# Patient Record
Sex: MALE | Race: WHITE | NOT HISPANIC OR LATINO | Employment: OTHER | URBAN - METROPOLITAN AREA
[De-identification: names, ages, dates, MRNs, and addresses within clinical notes are randomized per-mention and may not be internally consistent; named-entity substitution may affect disease eponyms.]

---

## 2017-01-02 ENCOUNTER — GENERIC CONVERSION - ENCOUNTER (OUTPATIENT)
Dept: OTHER | Facility: OTHER | Age: 72
End: 2017-01-02

## 2017-05-19 ENCOUNTER — LAB (OUTPATIENT)
Dept: LAB | Facility: CLINIC | Age: 72
End: 2017-05-19
Payer: COMMERCIAL

## 2017-05-19 DIAGNOSIS — K63.5 POLYP OF COLON: ICD-10-CM

## 2017-05-19 DIAGNOSIS — I25.10 ATHEROSCLEROTIC HEART DISEASE OF NATIVE CORONARY ARTERY WITHOUT ANGINA PECTORIS: ICD-10-CM

## 2017-05-19 LAB
ALBUMIN SERPL BCP-MCNC: 3.8 G/DL (ref 3.5–5)
ALP SERPL-CCNC: 81 U/L (ref 46–116)
ALT SERPL W P-5'-P-CCNC: 33 U/L (ref 12–78)
ANION GAP SERPL CALCULATED.3IONS-SCNC: 8 MMOL/L (ref 4–13)
AST SERPL W P-5'-P-CCNC: 35 U/L (ref 5–45)
BASOPHILS # BLD AUTO: 0.07 THOUSANDS/ΜL (ref 0–0.1)
BASOPHILS NFR BLD AUTO: 1 % (ref 0–1)
BILIRUB SERPL-MCNC: 0.77 MG/DL (ref 0.2–1)
BUN SERPL-MCNC: 14 MG/DL (ref 5–25)
CALCIUM SERPL-MCNC: 8.7 MG/DL (ref 8.3–10.1)
CHLORIDE SERPL-SCNC: 104 MMOL/L (ref 100–108)
CHOLEST SERPL-MCNC: 127 MG/DL (ref 50–200)
CO2 SERPL-SCNC: 28 MMOL/L (ref 21–32)
CREAT SERPL-MCNC: 0.86 MG/DL (ref 0.6–1.3)
EOSINOPHIL # BLD AUTO: 0.7 THOUSAND/ΜL (ref 0–0.61)
EOSINOPHIL NFR BLD AUTO: 13 % (ref 0–6)
ERYTHROCYTE [DISTWIDTH] IN BLOOD BY AUTOMATED COUNT: 12.6 % (ref 11.6–15.1)
GFR SERPL CREATININE-BSD FRML MDRD: >60 ML/MIN/1.73SQ M
GLUCOSE P FAST SERPL-MCNC: 95 MG/DL (ref 65–99)
HCT VFR BLD AUTO: 42.7 % (ref 36.5–49.3)
HDLC SERPL-MCNC: 47 MG/DL (ref 40–60)
HGB BLD-MCNC: 14.5 G/DL (ref 12–17)
LDLC SERPL CALC-MCNC: 35 MG/DL (ref 0–100)
LYMPHOCYTES # BLD AUTO: 1.92 THOUSANDS/ΜL (ref 0.6–4.47)
LYMPHOCYTES NFR BLD AUTO: 35 % (ref 14–44)
MCH RBC QN AUTO: 34.5 PG (ref 26.8–34.3)
MCHC RBC AUTO-ENTMCNC: 34 G/DL (ref 31.4–37.4)
MCV RBC AUTO: 102 FL (ref 82–98)
MONOCYTES # BLD AUTO: 0.43 THOUSAND/ΜL (ref 0.17–1.22)
MONOCYTES NFR BLD AUTO: 8 % (ref 4–12)
NEUTROPHILS # BLD AUTO: 2.38 THOUSANDS/ΜL (ref 1.85–7.62)
NEUTS SEG NFR BLD AUTO: 43 % (ref 43–75)
NRBC BLD AUTO-RTO: 0 /100 WBCS
PLATELET # BLD AUTO: 139 THOUSANDS/UL (ref 149–390)
PMV BLD AUTO: 9.7 FL (ref 8.9–12.7)
POTASSIUM SERPL-SCNC: 3.9 MMOL/L (ref 3.5–5.3)
PROT SERPL-MCNC: 7.1 G/DL (ref 6.4–8.2)
RBC # BLD AUTO: 4.2 MILLION/UL (ref 3.88–5.62)
SODIUM SERPL-SCNC: 140 MMOL/L (ref 136–145)
TRIGL SERPL-MCNC: 225 MG/DL
WBC # BLD AUTO: 5.5 THOUSAND/UL (ref 4.31–10.16)

## 2017-05-19 PROCEDURE — 80053 COMPREHEN METABOLIC PANEL: CPT

## 2017-05-19 PROCEDURE — 36415 COLL VENOUS BLD VENIPUNCTURE: CPT

## 2017-05-19 PROCEDURE — 80061 LIPID PANEL: CPT

## 2017-05-19 PROCEDURE — 85025 COMPLETE CBC W/AUTO DIFF WBC: CPT

## 2017-05-23 ENCOUNTER — ALLSCRIPTS OFFICE VISIT (OUTPATIENT)
Dept: OTHER | Facility: OTHER | Age: 72
End: 2017-05-23

## 2017-08-01 ENCOUNTER — ALLSCRIPTS OFFICE VISIT (OUTPATIENT)
Dept: OTHER | Facility: OTHER | Age: 72
End: 2017-08-01

## 2017-08-22 ENCOUNTER — ALLSCRIPTS OFFICE VISIT (OUTPATIENT)
Dept: OTHER | Facility: OTHER | Age: 72
End: 2017-08-22

## 2017-10-05 ENCOUNTER — ANESTHESIA EVENT (OUTPATIENT)
Dept: PERIOP | Facility: AMBULARY SURGERY CENTER | Age: 72
End: 2017-10-05
Payer: COMMERCIAL

## 2017-11-03 ENCOUNTER — HOSPITAL ENCOUNTER (OUTPATIENT)
Facility: AMBULARY SURGERY CENTER | Age: 72
Setting detail: OUTPATIENT SURGERY
Discharge: HOME | End: 2017-11-03
Attending: COLON & RECTAL SURGERY | Admitting: COLON & RECTAL SURGERY
Payer: COMMERCIAL

## 2017-11-03 ENCOUNTER — ANESTHESIA (OUTPATIENT)
Dept: PERIOP | Facility: AMBULARY SURGERY CENTER | Age: 72
End: 2017-11-03
Payer: COMMERCIAL

## 2017-11-03 VITALS
HEIGHT: 69 IN | RESPIRATION RATE: 18 BRPM | SYSTOLIC BLOOD PRESSURE: 128 MMHG | DIASTOLIC BLOOD PRESSURE: 70 MMHG | HEART RATE: 72 BPM | TEMPERATURE: 97.4 F | WEIGHT: 207 LBS | OXYGEN SATURATION: 95 % | BODY MASS INDEX: 30.66 KG/M2

## 2017-11-03 DIAGNOSIS — Z86.010 HISTORY OF COLONIC POLYPS: ICD-10-CM

## 2017-11-03 PROCEDURE — 88305 TISSUE EXAM BY PATHOLOGIST: CPT | Performed by: COLON & RECTAL SURGERY

## 2017-11-03 RX ORDER — PROPOFOL 10 MG/ML
INJECTION, EMULSION INTRAVENOUS CONTINUOUS PRN
Status: DISCONTINUED | OUTPATIENT
Start: 2017-11-03 | End: 2017-11-03 | Stop reason: SURG

## 2017-11-03 RX ORDER — ATENOLOL 25 MG/1
25 TABLET ORAL DAILY
COMMUNITY
End: 2018-08-17 | Stop reason: SDUPTHER

## 2017-11-03 RX ORDER — OMEPRAZOLE 20 MG/1
20 CAPSULE, DELAYED RELEASE ORAL DAILY
COMMUNITY
End: 2018-04-30

## 2017-11-03 RX ORDER — PAROXETINE 10 MG/1
10 TABLET, FILM COATED ORAL DAILY
COMMUNITY
End: 2018-09-22 | Stop reason: SDUPTHER

## 2017-11-03 RX ORDER — CLOPIDOGREL BISULFATE 75 MG/1
75 TABLET ORAL DAILY
COMMUNITY
End: 2018-08-30 | Stop reason: ALTCHOICE

## 2017-11-03 RX ORDER — PROPOFOL 10 MG/ML
INJECTION, EMULSION INTRAVENOUS AS NEEDED
Status: DISCONTINUED | OUTPATIENT
Start: 2017-11-03 | End: 2017-11-03 | Stop reason: SURG

## 2017-11-03 RX ORDER — SODIUM CHLORIDE, SODIUM LACTATE, POTASSIUM CHLORIDE, CALCIUM CHLORIDE 600; 310; 30; 20 MG/100ML; MG/100ML; MG/100ML; MG/100ML
INJECTION, SOLUTION INTRAVENOUS CONTINUOUS PRN
Status: DISCONTINUED | OUTPATIENT
Start: 2017-11-03 | End: 2017-11-03 | Stop reason: SURG

## 2017-11-03 RX ORDER — OMEGA-3-ACID ETHYL ESTERS 1 G/1
2 CAPSULE, LIQUID FILLED ORAL 2 TIMES DAILY
COMMUNITY
End: 2018-03-27 | Stop reason: SDUPTHER

## 2017-11-03 RX ORDER — UBIDECARENONE 200 MG
400 CAPSULE ORAL DAILY
COMMUNITY

## 2017-11-03 RX ORDER — EZETIMIBE AND SIMVASTATIN 10; 20 MG/1; MG/1
1 TABLET ORAL
COMMUNITY
End: 2018-07-24 | Stop reason: SDUPTHER

## 2017-11-03 RX ORDER — VALSARTAN AND HYDROCHLOROTHIAZIDE 160; 12.5 MG/1; MG/1
1 TABLET, FILM COATED ORAL DAILY
COMMUNITY
End: 2018-06-03 | Stop reason: SDUPTHER

## 2017-11-03 RX ADMIN — SODIUM CHLORIDE, SODIUM LACTATE, POTASSIUM CHLORIDE, AND CALCIUM CHLORIDE: .6; .31; .03; .02 INJECTION, SOLUTION INTRAVENOUS at 11:28

## 2017-11-03 RX ADMIN — PROPOFOL 100 MCG/KG/MIN: 10 INJECTION, EMULSION INTRAVENOUS at 12:09

## 2017-11-03 RX ADMIN — PROPOFOL 100 MG: 10 INJECTION, EMULSION INTRAVENOUS at 12:09

## 2017-11-03 NOTE — OP NOTE
OPERATIVE REPORT/COLONOSCOPY   PATIENT NAME: Ana Dunn    :  1945  MRN: [id number]  Pt Location: [facility]  GI ROOM 01    SURGERY DATE: 11/3/2017    Surgeon(s) and Role:     Andrea Parry MD - Primary    Preop Diagnosis:  History of colonic polyps [Z86 010]    Post-Op Diagnosis Codes:     * History of colonic polyps [Z86 010]    Procedure(s) (LRB):  COLONOSCOPY (N/A)    Specimen(s):  ID Type Source Tests Collected by Time Destination   1 : ascending colon polyp hot forcep Tissue Polyp, Colorectal TISSUE EXAM Sunil Lee MD 11/3/2017 1219    2 : hepatic flexure polyp hot forcep Tissue Polyp, Colorectal TISSUE EXAM Sunil Lee MD 11/3/2017 1221    3 : transverse colon polyp hot forcep Tissue Polyp, Colorectal TISSUE EXAM Sunil Lee MD 11/3/2017 1222        Estimated Blood Loss:   Minimal    Drains:       Anesthesia Type:   IV Sedation with Anesthesia    Operative Indications:  History of colonic polyps [Z86 010]    Operative Findings:  Normal rectal exam  No prostate nodules  Colon polyps  Diverticulosis, pancolonic, mild  Complications:   None    Procedure and Technique: The colonoscope was inserted and advanced to the cecum with ease  The cecum was identified by the confluence of the tenia coli, the appendiceal orifice, and the ileocecal valve  A 1 cm ascending colon polyp was removed with hot biopsy polypectomy  Two 1 cm polyps were identified in the transverse colon  These were removed with hot biopsy technique and sent for pathologic evaluation  A single 1 cm hepatic polyp was removed with hot biopsy technique  Diverticulosis was present minimally in the right colon and transverse colon  Mild sigmoid diverticulosis was present as well  Photographs are available in a scanned format separate from this bile      I was present for the entire procedure    Patient Disposition:  PACU     SIGNATURE: Sunil Lee MD  DATE: November 3, 2017  TIME: 12:38 PM

## 2017-11-03 NOTE — H&P
History and Physical   Colon and Rectal Surgery   Sandy Epps 67 y o  male MRN: [id number]  Unit/Bed#: OR POOL Encounter: [id number]  11/03/17   12:02 PM      CC: History of colon polyps  History of Present Illness   HPI:  Sandy Epps is a 67 y o  male for surveillance of his colon  He denies GI symptoms  He has no cardiovascular or pulmonary symptoms today  Historical Information   Past Medical History:   Diagnosis Date    Anxiety     Coronary artery disease     Depression     GERD (gastroesophageal reflux disease)     Hyperlipidemia     Hypertension     Myocardial infarction      Past Surgical History:   Procedure Laterality Date    COLONOSCOPY      CORONARY ANGIOPLASTY      ROTATOR CUFF REPAIR Bilateral        Meds/Allergies     Prescriptions Prior to Admission   Medication    aspirin (ECOTRIN) 325 mg EC tablet    atenolol (TENORMIN) 25 mg tablet    clopidogrel (PLAVIX) 75 mg tablet    Coenzyme Q10 75 MG CAPS    ezetimibe-simvastatin (VYTORIN) 10-20 mg per tablet    Glucosamine-Chondroitin (OSTEO BI-FLEX REGULAR STRENGTH PO)    Multiple Vitamins-Minerals (ONE-A-DAY 50 PLUS PO)    omega-3-acid ethyl esters (LOVAZA) 1 g capsule    omeprazole (PriLOSEC) 20 mg delayed release capsule    PARoxetine (PAXIL) 10 mg tablet    valsartan-hydrochlorothiazide (DIOVAN-HCT) 160-12 5 MG per tablet       No current facility-administered medications for this encounter       Facility-Administered Medications Ordered in Other Encounters:     lactated ringers infusion, , , Continuous PRN, Creig Cerise, CRNA    No Known Allergies      Social History   History   Alcohol Use    1 2 oz/week    2 Shots of liquor per week     Comment: 2 martinis/ day     History   Drug Use No     History   Smoking Status    Never Smoker   Smokeless Tobacco    Never Used         Family History:     Objective     Current Vitals:   Blood Pressure: 149/78 (11/03/17 1050)  Pulse: 65 (11/03/17 1050)  Temperature: (!) 97 3 °F (36 3 °C) (11/03/17 1050)  Temp Source: Temporal (11/03/17 1050)  Respirations: 18 (11/03/17 1050)  Height: 5' 9" (175 3 cm) (11/03/17 1050)  Weight - Scale: 93 9 kg (207 lb) (11/03/17 1050)  SpO2: 97 % (11/03/17 1050)  No intake or output data in the 24 hours ending 11/03/17 1202    Physical Exam:  General:  Well nourished, no distress  Neuro: Alert and oriented  Eyes:Sclera anicteric, conjunctiva pink  Pulm: Clear to auscultation bilaterally  No respiratory Distress  CV:  Regular rate and rhythm  No murmurs  Abdomen:  Soft, flat, non-tender, without masses or hepatosplenomegaly  Lab Results:       ASSESSMENT:  Ann Arceo is a 67 y o  male for surveillance of polyps  PLAN:  Colonoscopy  Risks , including, but not limited to, bleeding, perforation, missed lesions, and potential need for surgery, were reviewed  Alternatives to colonoscopy were discussed    Brian Rios MD

## 2017-11-03 NOTE — ANESTHESIA PREPROCEDURE EVALUATION
Review of Systems/Medical History  Patient summary reviewed  Chart reviewed  No history of anesthetic complications     Cardiovascular  Exercise tolerance: good,  Hyperlipidemia, Hypertension , Past MI , CAD, , Cardiac stents     Pulmonary  Negative pulmonary ROS ,        GI/Hepatic    GERD ,   Comment: History of polyps     Negative  ROS        Endo/Other  Negative endo/other ROS      GYN       Hematology  Negative hematology ROS      Musculoskeletal  Negative musculoskeletal ROS        Neurology  Negative neurology ROS      Psychology   Anxiety, Depression ,            Physical Exam    Airway    Mallampati score: II  TM Distance: >3 FB  Neck ROM: full     Dental   Comment: Partial plate upper no reported loose or chipped, upper dentures,     Cardiovascular  Cardiovascular exam normal    Pulmonary  Pulmonary exam normal     Other Findings        Anesthesia Plan  ASA Score- 3       Anesthesia Type- IV sedation with anesthesia with ASA Monitors  Additional Monitors:   Airway Plan:     Comment: Discussed with pt the possibility under sedation to have recall or mild discomfort          Induction- intravenous  Informed Consent- Anesthetic plan and risks discussed with patient  I personally reviewed this patient with the CRNA  Discussed and agreed on the Anesthesia Plan with the ASHLEY Steward

## 2017-11-03 NOTE — ANESTHESIA POSTPROCEDURE EVALUATION
Post-Op Assessment Note      CV Status:  Stable    Mental Status:  Awake and lethargic    Hydration Status:  Euvolemic    PONV Controlled:  None    Airway Patency:  Patent    Post Op Vitals Reviewed:  Yes              BP      Temp     Pulse     Resp      SpO2

## 2017-11-03 NOTE — DISCHARGE INSTRUCTIONS
Colonoscopy   WHAT YOU NEED TO KNOW:   A colonoscopy is a procedure to examine the inside of your colon (intestine) with a scope  Polyps or tissue growths may have been removed during your colonoscopy  It is normal to feel bloated and to have some abdominal discomfort  You should be passing gas  If you have hemorrhoids or you had polyps removed, you may have a small amount of bleeding  DISCHARGE INSTRUCTIONS:   Seek care immediately if:   · You have a large amount of bright red blood in your bowel movements  · Your abdomen is hard and firm and you have severe pain  · You have sudden trouble breathing  Contact your healthcare provider if:   · You develop a rash or hives  · You have a fever within 24 hours of your procedure       · You have not had a bowel movement for 3 days after your procedure  · You have questions or concerns about your condition or care  Activity:   · Do not lift, strain, or run  for 3 days after your procedure  · Rest after your procedure  You have been given medicine to relax you  Do not  drive or make important decisions until the day after your procedure  Return to your normal activity as directed  · Relieve gas and discomfort from bloating  by lying on your right side with a heating pad on your abdomen  You may need to take short walks to help the gas move out  Eat small meals until bloating is relieved  If you had polyps removed: For 7 days after your procedure:  · Do not  take aspirin  · Do not  go on long car rides  Follow up with your healthcare provider as directed:  Write down your questions so you remember to ask them during your visits  © 2017 [employer] is for End User's use only and may not be sold, redistributed or otherwise used for commercial purposes  All illustrations and images included in CareNotes® are the copyrighted property of [employer]  or Timmy Cameron    The above information is an  only  It is not intended as medical advice for individual conditions or treatments  Talk to your doctor, nurse or pharmacist before following any medical regimen to see if it is safe and effective for you

## 2017-11-21 ENCOUNTER — ALLSCRIPTS OFFICE VISIT (OUTPATIENT)
Dept: OTHER | Facility: OTHER | Age: 72
End: 2017-11-21

## 2017-11-22 NOTE — PROGRESS NOTES
Assessment    1  Acute bacterial sinusitis (461 9) (J01 90,B96 89)   2  Strain of right trapezius muscle, initial encounter (840 8) (X87 557V)    Plan  Acute bacterial sinusitis    · Amoxicillin-Pot Clavulanate 875-125 MG Oral Tablet; TAKE 1 TABLET EVERY 12HOURS DAILY   · Benzonatate 100 MG Oral Capsule; TAKE 1 CAPSULE 3 TIMES DAILY ASNEEDED  Neck pain    · TiZANidine HCl - 2 MG Oral Tablet; 1-2 TABS AT HS FOR SPASM    Discussion/Summary    72M with:Acute bacterial sinusitis with bronchitis- likely viral bronchitis, but given that he is on day 8 or 9 of URI symptoms, may also have a possible bacterial sinusitis brewing  No concern for PNA on exam Rx of Augmentin x 7 days if symptoms do not improve in the next day or two given that it is the holidayPerles 100mg TID PRN coughRobitussin AC if neededTylenol as needed for painsyrup for the coughMucinex DM 1200mg two times a dayplenty of fluidsif symptoms worsen or do not improve in the next week, reviewed warning signs and symptoms  Trapezius muscle shoulder and arm pain- likely from sleep positioning chronically given that he has hypertonicity of both muscles, but R>L, likely causing the compressive symptomspack to areasleep position-Tizanidine PRN (has used it in the past)  The patient was counseled regarding instructions for management,-- prognosis,-- impressions,-- risks and benefits of treatment options,-- importance of compliance with treatment  Possible side effects of new medications were reviewed with the patient/guardian today  The treatment plan was reviewed with the patient/guardian  The patient/guardian understands and agrees with the treatment plan      Chief Complaint    1  Cold Symptoms   2  Cough  sore throat and cough      History of Present Illness  HPI: 72M with sore throat and cough  States that it started with a sore throat x 1 week, tried to wait it out, but is hacking up a green brown sputum x 2-3 days   Sore throat has gone away, has pleuritic chest pain when he coughs  non-smokertried Mucinex but not working at all anymore, also tried [place]  fever, chills, SOB, n/v any sick contacts  PNA and flu UTDrecent changes in medications  Shoulder pain: Pt is also complaining of pain in his bilateral arms that only happens at night after he has been sleeping a few hours, no new bedtime changes  Always feels like pins/needles and a numbness sensation, but then it will go away after a few  will have back pain and lower leg stiffness after long  albuterol neb 0 63mg, ASA 325mg, Atenolol 25mg, Clopidogrel 75mg, CoQ10, Diclofenac gel, Exetimibe-Simvastatin, Omega3, Pantoprazole 40mg, Paroxetine 10mg, Tizanidine 2mg, Valsartan-HCTZ, Viagra, Tamsulosin      Review of Systems   Constitutional: no fever or chills, feels well, no tiredness, no recent weight loss or weight gain  ENT: sore throat, but-- no earache,-- no hearing loss-- and-- no nasal discharge  Cardiovascular: chest pain, but-- no palpitations  Respiratory: cough, but-- no shortness of breath,-- no orthopnea,-- no wheezing,-- no shortness of breath during exertion-- and-- no PND  Gastrointestinal: no abdominal pain,-- no nausea,-- no vomiting,-- no constipation-- and-- no diarrhea  Musculoskeletal: arthralgias,-- joint swelling,-- limb pain-- and-- joint stiffness  Neurological: numbness-- and-- tingling, but-- no headache  ROS reviewed  Active Problems  1  Acute myocardial infarction of inferior wall (410 40) (I21 19)   2  Anxiety (300 00) (F41 9)   3  Arteriosclerotic cardiovascular disease (429 2,440 9) (I25 10)   4  Asthma (493 90) (J45 909)   5  Benign colon polyp (211 3) (K63 5)   6  CAD (coronary atherosclerotic disease) (414 00) (I25 10)   7  Carpal tunnel syndrome, unspecified laterality (354 0) (G56 00)   8  Cutaneous horn (702 8) (L85 8)   9  Diverticulosis (562 10) (K57 90)   10  Dupuytren's contracture (728 6) (M72 0)   11  Essential hypertriglyceridemia (272 1) (E78 1)   12   GERD without esophagitis (530 81) (K21 9)   13  Head congestion (478 19)   14  Hyperlipidemia (272 4) (E78 5)   15  Hypertension (401 9) (I10)   16  Impingement syndrome of left shoulder (726 2) (M75 42)   17  Keratosis (701 1) (L57 0)   18  Laceration of face (873 40) (S01 81XA)   19  Myalgia And Myositis (729 1)   20  Neck pain (723 1) (M54 2)   21  Strain of neck muscle, initial encounter (847 0) (S16 1XXA)   22  Tenosynovitis Of The Left Middle Finger (727 05)   23  Thrombocytopenia (287 5) (D69 6)   24  Urgency of urination (788 63) (R39 15)    Past Medical History  1  History of Complete tear of rotator cuff, unspecified laterality (727 61) (M75 120)   2  History of upper respiratory infection (V12 09) (Z87 09)   3  History of Laceration Of Elbow (881 01)   4  History of Laceration Of Lip (873 43)    Family History  Mother    1  Family history of Brain tumor   2  Family history of CAD (coronary artery disease)  Father    3  No pertinent family history    Social History   · Former smoker (N42 17) (B88 410)   · Social alcohol use (Z78 9)    Surgical History    1  History of Shoulder Surgery   2  History of Transcath Placement Of Carotid Artery Stent    Current Meds   1  Aspirin 325 MG Oral Tablet; Therapy: (Recorded:77Gex4551) to Recorded   2  Atenolol 25 MG Oral Tablet; take 1 tablet every day; Therapy: 68Okd9847 to (Geovani Diop)  Requested for: 77KXS1978; Last Rx:20Ihl9402 Ordered   3  Centrum Silver Ultra Mens Oral Tablet; TAKE 1 TABLET DAILY; Therapy: (Recorded:30Jun2015) to Recorded   4  Clopidogrel Bisulfate 75 MG Oral Tablet; take 1 tablet by mouth every day; Therapy: 16JCT9548 to (Evaluate:83Jyq3515)  Requested for: 49Hqf5522; Last Rx:04Aug2017 Ordered   5  CoQ-10 CAPS; Take as directed Recorded   6  Diclofenac Sodium 1 % Transdermal Gel; APPLY TO ELBOW BID AS DIRECTED; Therapy: 57FGF9806 to (Everette Montelongo)  Requested for: 90EGC9287; Last Rx:05Jan2017 Ordered   7   Ezetimibe-Simvastatin 10-20 MG Oral Tablet; TAKE 1 TABLET DAILY  Requested for: 21HIB4692; Last Rx:84Atq4111 Ordered   8  Omega-3-acid Ethyl Esters 1 GM Oral Capsule; TAKE 2 CAPSULE Twice daily  Requested for: 81PTY7485; Last Rx:31Lpg3623 Ordered   9  Pantoprazole Sodium 40 MG Oral Tablet Delayed Release; take 1 tablet by mouth every day; Therapy: 87XPE7018 to (Evaluate:20Apr2018)  Requested for: 99Nif8888; Last Rx:05Icu6895 Ordered   10  PARoxetine HCl - 10 MG Oral Tablet; TAKE 1 TABLET DAILY AS DIRECTED; Therapy: 13NUO6025 to (Evaluate:10Aug2018)  Requested for: 98QAV4176; Last  Rx:25Tep7192 Ordered   11  Tamsulosin HCl - 0 4 MG Oral Capsule; 1 CAP AT HS- DO NOT COMBINE WITH  VIAGRA; Therapy: 31WTW5238 to (Saeed Cobia)  Requested for: 10Aug2017; Last  Rx:10Aug2017 Ordered   12  TiZANidine HCl - 2 MG Oral Tablet; 1-2 TABS AT HS FOR SPASM; Therapy: 40Ane8275 to (Evaluate:65Ciq5584)  Requested for: 01Lwl3330; Last  Rx:70Awt1527 Ordered   13  Valsartan-Hydrochlorothiazide 160-12 5 MG Oral Tablet; take 1 tablet every day; Therapy: 20RJJ2201 to (Jeanell Blend)  Requested for: 34BGO2084; Last  Rx:61Dra9077 Ordered   14  Viagra 50 MG Oral Tablet; Therapy: 37Sjo6757 to (Evaluate:06Tyv6440) Recorded    The medication list was reviewed and updated today  Allergies  1  Atorvastatin Calcium TABS    Vitals   Recorded: 21Nov2017 03:02PM   Temperature 98 3 F   Heart Rate 70   Respiration 16   Systolic 678, LUE, Sitting   Diastolic 74, LUE, Sitting   Height 5 ft 6 in   Weight 210 lb    BMI Calculated 33 9   BSA Calculated 2 04       Physical Exam   Constitutional  General appearance: No acute distress, well appearing and well nourished  Eyes  Conjunctiva and lids: No swelling, erythema, or discharge  Ears, Nose, Mouth, and Throat  External inspection of ears and nose: Normal    Otoscopic examination: Tympanic membrance translucent with normal light reflex  Canals patent without erythema     Nasal mucosa, septum, and turbinates: Normal without edema or erythema  Oropharynx: Abnormal   The posterior pharynx was erythematous, but-- did not have an exudate  Oropharynx examination showed no lesions  Oral mucosa was normal  The palate examination showed no abnormalities  The tongue was normal  The tonsils were normal   Pulmonary  Respiratory effort: No increased work of breathing or signs of respiratory distress  Auscultation of lungs: Clear to auscultation, equal breath sounds bilaterally, no wheezes, no rales, no rhonci  -- occ coarse breath sounds at bases that clear with coughing  Cardiovascular  Auscultation of heart: Normal rate and rhythm, normal S1 and S2, without murmurs  Musculoskeletal  Gait and station: Normal  -- slightly swollen left thumb  -- has muscle hypertonicity and TTP of right trapezius, intact strength and ROM bilaterally          Future Appointments    Date/Time Provider Specialty Site   11/27/2017 03:00 PM [address],  Internal Medicine [address]       Signatures   Electronically signed by : LENORA Schwartz ; Nov 21 2017  4:09PM EST                       (Author)

## 2017-11-23 DIAGNOSIS — I25.10 ATHEROSCLEROTIC HEART DISEASE OF NATIVE CORONARY ARTERY WITHOUT ANGINA PECTORIS: ICD-10-CM

## 2017-11-23 DIAGNOSIS — Z12.5 ENCOUNTER FOR SCREENING FOR MALIGNANT NEOPLASM OF PROSTATE: ICD-10-CM

## 2017-11-23 DIAGNOSIS — E78.5 HYPERLIPIDEMIA: ICD-10-CM

## 2017-11-30 ENCOUNTER — GENERIC CONVERSION - ENCOUNTER (OUTPATIENT)
Dept: FAMILY MEDICINE CLINIC | Facility: CLINIC | Age: 72
End: 2017-11-30

## 2017-12-21 ENCOUNTER — GENERIC CONVERSION - ENCOUNTER (OUTPATIENT)
Dept: FAMILY MEDICINE CLINIC | Facility: CLINIC | Age: 72
End: 2017-12-21

## 2018-01-04 ENCOUNTER — GENERIC CONVERSION - ENCOUNTER (OUTPATIENT)
Dept: FAMILY MEDICINE CLINIC | Facility: CLINIC | Age: 73
End: 2018-01-04

## 2018-01-09 NOTE — RESULT NOTES
Verified Results  (1) TISSUE EXAM 21Apr2016 12:00AM Leafy Latin     Test Name Result Flag Reference   LAB AP CASE REPORT (Report)     Surgical Pathology Report             Case: [id number]                   Authorizing Provider: Oscar Araujo DO      Collected:      04/21/2016           Pathologist:      Dajuan Reid MD      Received:      04/22/2016 1047        Specimen:  Skin, Other, Right upper chest   LAB AP FINAL DIAGNOSIS      A  Skin, right upper chest, shave excision:  - Seborrheic keratosis, inflamed/irritated/lichenoid and hyperkeratotic  ï¾          Interpretation performed at [place] [address]  LAB AP SURGICAL ADDITIONAL INFORMATION (Report)     These tests were developed and their performance characteristics   determined by [address] [place] [address] or [place]  They may not be cleared or approved by the [place]  Food and   Drug Administration  The FDA has determined that such clearance or   approval is not necessary  These tests are used for clinical purposes  They should not be regarded as investigational or for research  This   laboratory has been approved by [place], designated as a high-complexity   laboratory and is qualified to perform these tests  LAB AP GROSS DESCRIPTION (Report)     A  The specimen is received in formalin, labeled with the patient's name   and hospital number, and is designated shave excision right upper chest    The specimen consists of a tan superficial shave of skin measuring 0 7 x   0 7 x 0 1 cm  The skin surface exhibits a friable keratotic appearing tan   lesion which overhangs the shaved margin, measuring approximately 1 2 x 1   x 0 4 cm  The margin is inked blue  The skin surface is inked red  The   specimen is trisected lengthwise  Entirely submitted  One cassette      Note: The estimated total formalin fixation time based upon information   provided by the submitting clinician and the standard processing schedule   is over 72 hours   MAC

## 2018-01-11 NOTE — RESULT NOTES
Verified Results  * MRI ELBOW LEFT WO CONTRAST 72Jfz1457 09:10AM [facility] PRAJAPATI Order Number: [id number]    - Patient Instructions: To schedule this appointment, please contact Central Scheduling at [phone]  Test Name Result Flag Reference   MRI ELBOW LEFT WO CONTRAST (Report)     This is a summary report  The complete report is available in the patient's medical record  If you cannot access the medical record, please contact the sending organization for a detailed fax or copy  MRI LEFT ELBOW     INDICATION: Lifting injury with elbow pain  COMPARISON: None  TECHNIQUE: The following MR sequences were acquired of the left elbow:   Localizer, axial T1, axial T2 fat sat, coronal T1, Coronal STIR or T2 fat sat, sagittal T2 fat sat  Images were acquired on a 1 5 Ally MR unit  Gadolinium was not used  FINDINGS:     SUBCUTANEOUS TISSUES: Normal     JOINT EFFUSION: Joint effusion noted  TENDONS: Intact brachialis insertion  There is a partial tear involving the biceps insertion on the radial tuberosity best seen on series 3 images 5 through 8  The tear is relatively high-grade but not complete  There is associated soft tissue edema    and probable hematoma formation in this region  Triceps tendon is intact  Common flexor origin is normal  Common extensor origin tendinosis and interstitial tearing identified  LIGAMENTS: The following ligaments are intact: radial collateral, lateral ulnar collateral, annular, and ulnar collateral      CUBITAL TUNNEL INCLUDING ULNAR NERVE: Intact  RADIAL NERVE: Intact  ARTICULAR SURFACES: Intact  BONE MARROW SIGNAL: Degenerative changes noted across the elbow  MUSCULATURE: Normal        IMPRESSION:   1  High-grade partial tear at the biceps insertion on the radial tuberosity as above  2  Common extensor origin tendinosis with mild interstitial tearing appreciated         Workstation performed: [id number]     Signed by: Lana Mckeon MD   12/29/16       Plan  Nontraumatic rupture of left bicep tendon    · *1 - [facility] (ORTHOPEDIC SURGERY ) Physician  Referral  Consult Only: the expectation is that the referring provider will  communicate back to the patient on treatment options  Evaluation and Treatment: the  expectation is that the referred to provider will communicate back to the patient  on treatment options    Status: Active  Requested for: 01BCH7556  Care Summary provided  : Yes

## 2018-01-11 NOTE — RESULT NOTES
Message   Continue same meds- repeat carotid study in 1 year  Verified Results  VAS CAROTID COMPLETE STUDY 29Nov2016 12:46PM Sharron Sheets Order Number: [id number]    - Patient Instructions: To schedule this appointment, please contact Central Scheduling at [phone]  Test Name Result Flag Reference   VAS CAROTID COMPLETE STUDY (Report)     [facility] REPORT   CLINICAL:   Indications:   Bruit [R09 89]  Patient presents with occasional headaches  He is   asymptomatic from a cerebral vascular standpoint and is unsure why he is having   test done  Operative History   1/1/2004 Coronary angioplasty with stent placement   Risk Factors   The patient has history of HTN, hyperlipidemia, CAD and previous smoking (quit   >10yrs ago)  Clinical   Right Brachial Pressure: 132/70 mmHg, Left Brachial Pressure: 126/70 mmHg  FINDINGS:      Right    Impression PSV EDV (cm/s) Direction of Flow Ratio    Dist  ICA         95     32           1 13    Mid  ICA         82     27           0 97    Prox  ICA  1 - 49%   83     18           0 99    Dist CCA         97     20                 Mid CCA          84     16           0 82    Prox CCA         103     19                 Ext Carotid       110     13           1 31    Prox Vert         34     12 Antegrade            Subclavian        188      0                    Left     Impression PSV EDV (cm/s) Direction of Flow Ratio    Dist  ICA         66     19           0 66    Mid  ICA         88     27           0 88    Prox  ICA  1 - 49%   100     20           1 00    Dist CCA         99     23                 Mid CCA         100     20           0 97    Prox CCA         103     18                 Ext Carotid        98     13           0 98    Prox Vert         60     19 Antegrade            Subclavian        145     15                          CONCLUSION:   Impression   RIGHT:   There is <50% stenosis noted in the internal carotid artery   Plaque is   heterogenous and irregular  Vertebral artery flow is antegrade  There is no significant subclavian artery   disease  LEFT:   There is <50% stenosis noted in the internal carotid artery  Plaque is   heterogenous and irregular  Vertebral artery flow is antegrade  There is no significant subclavian artery   disease  Recommend repeat study in 1 year as per protocol, unless otherwise clinically   indicated        SIGNATURE:   Electronically Signed by: Aura Sim MD on 2016-11-29 02:08:53 PM

## 2018-01-13 VITALS
HEIGHT: 66 IN | RESPIRATION RATE: 16 BRPM | HEART RATE: 60 BPM | DIASTOLIC BLOOD PRESSURE: 68 MMHG | TEMPERATURE: 98.7 F | BODY MASS INDEX: 33.65 KG/M2 | SYSTOLIC BLOOD PRESSURE: 142 MMHG | WEIGHT: 209.38 LBS

## 2018-01-13 VITALS
BODY MASS INDEX: 33.75 KG/M2 | HEIGHT: 66 IN | HEART RATE: 70 BPM | TEMPERATURE: 98.3 F | RESPIRATION RATE: 16 BRPM | DIASTOLIC BLOOD PRESSURE: 74 MMHG | WEIGHT: 210 LBS | SYSTOLIC BLOOD PRESSURE: 118 MMHG

## 2018-01-13 VITALS
SYSTOLIC BLOOD PRESSURE: 130 MMHG | BODY MASS INDEX: 33.59 KG/M2 | DIASTOLIC BLOOD PRESSURE: 86 MMHG | HEART RATE: 69 BPM | HEIGHT: 66 IN | WEIGHT: 209 LBS

## 2018-01-13 NOTE — RESULT NOTES
Verified Results  (1) CBC/PLT/DIFF [id number] 07:58AM Daren Bernal    Order Number: [id number]     Test Name Result Flag Reference   WBC COUNT 5 81 Thousand/uL  4 31-10 16   RBC COUNT 4 22 Million/uL  3 88-5 62   HEMOGLOBIN 14 5 g/dL  12 0-17 0   HEMATOCRIT 42 2 %  36 5-49 3    fL H 82-98   MCH 34 4 pg H 26 8-34 3   MCHC 34 4 g/dL  31 4-37 4   RDW 12 5 %  11 6-15 1   MPV 9 9 fL  8 9-12 7   PLATELET COUNT 050 Thousands/uL L 149-390   nRBC AUTOMATED 0 /100 WBCs     NEUTROPHILS RELATIVE PERCENT 49 %  43-75   LYMPHOCYTES RELATIVE PERCENT 29 %  14-44   MONOCYTES RELATIVE PERCENT 8 %  4-12   EOSINOPHILS RELATIVE PERCENT 13 % H 0-6   BASOPHILS RELATIVE PERCENT 1 %  0-1   NEUTROPHILS ABSOLUTE COUNT 2 84 Thousands/?L  1 85-7 62   LYMPHOCYTES ABSOLUTE COUNT 1 69 Thousands/?L  0 60-4 47   MONOCYTES ABSOLUTE COUNT 0 49 Thousand/?L  0 17-1 22   EOSINOPHILS ABSOLUTE COUNT 0 73 Thousand/?L H 0 00-0 61   BASOPHILS ABSOLUTE COUNT 0 05 Thousands/?L  0 00-0 10   - Patient Instructions: This bloodwork is non-fasting  Please drink two glasses of water morning of bloodwork  - Patient Instructions: This bloodwork is non-fasting  Please drink two glasses of water morning of bloodwork  (1) COMPREHENSIVE METABOLIC PANEL [id number] 22:71LU Roberto Zaheer Order Number: [id number]     Test Name Result Flag Reference   GLUCOSE,RANDM 103 mg/dL     If the patient is fasting, the ADA then defines impaired fasting glucose as > 100 mg/dL and diabetes as > or equal to 123 mg/dL     SODIUM 138 mmol/L  136-145   POTASSIUM 3 8 mmol/L  3 5-5 3   CHLORIDE 103 mmol/L  100-108   CARBON DIOXIDE 26 mmol/L  21-32   ANION GAP (CALC) 9 mmol/L  4-13   BLOOD UREA NITROGEN 12 mg/dL  5-25   CREATININE 0 83 mg/dL  0 60-1 30   Standardized to IDMS reference method   CALCIUM 8 4 mg/dL  8 3-10 1   BILI, TOTAL 0 87 mg/dL  0 20-1 00   ALK PHOSPHATAS 92 U/L     ALT (SGPT) 43 U/L  12-78   AST(SGOT) 31 U/L 5-45   ALBUMIN 3 6 g/dL  3 5-5 0   TOTAL PROTEIN 6 7 g/dL  6 4-8 2   eGFR Non-African American      >60 0 ml/min/1 73sq m   - Patient Instructions: This is a fasting blood test  Water,black tea or black  coffee only after 9:00pm the night before test Drink 2 glasses of water the morning of test - Patient Instructions: This bloodwork is non-fasting  Please drink two glasses of   water morning of bloodwork  National Kidney Disease Education Program recommendations are as follows:  GFR calculation is accurate only with a steady state creatinine  Chronic Kidney disease less than 60 ml/min/1 73 sq  meters  Kidney failure less than 15 ml/min/1 73 sq  meters  (1) LIPID PANEL, FASTING 01Sep2016 07:58AM Basim Santiago    Order Number: [id number]     Test Name Result Flag Reference   CHOLESTEROL 126 mg/dL     HDL,DIRECT 41 mg/dL  40-60   Specimen collection should occur prior to Metamizole administration due to the potential for falsely depressed results  LDL CHOLESTEROL CALCULATED 30 mg/dL  0-100   - Patient Instructions: This is a fasting blood test  Water,black tea or black  coffee only after 9:00pm the night before test   Drink 2 glasses of water the morning of test     - Patient Instructions: This is a fasting blood test  Water,black tea or black  coffee only after 9:00pm the night before test Drink 2 glasses of water the morning of test - Patient Instructions: This bloodwork is non-fasting  Please drink two glasses of   water morning of bloodwork  Triglyceride:         Normal              <150 mg/dl       Borderline High    150-199 mg/dl       High               200-499 mg/dl       Very High          >499 mg/dl  Cholesterol:         Desirable        <200 mg/dl      Borderline High  200-239 mg/dl      High             >239 mg/dl  HDL Cholesterol:        High    >59 mg/dL      Low     <41 mg/dL  LDL CALCULATED:    This screening LDL is a calculated result    It does not have the accuracy of the Direct Measured LDL in the monitoring of patients with hyperlipidemia and/or statin therapy  Direct Measure LDL (EAW348) must be ordered separately in these patients  TRIGLYCERIDES 274 mg/dL H <=150   Specimen collection should occur prior to N-Acetylcysteine or Metamizole administration due to the potential for falsely depressed results  (1) TSH 01Sep2016 07:58AM Christian PRAJAPATI Order Number: [id number]     Test Name Result Flag Reference   TSH 3 120 uIU/mL  0 358-3 740   - Patient Instructions: This bloodwork is non-fasting  Please drink two glasses of water morning of bloodwork  - Patient Instructions: This is a fasting blood test  Water,black tea or black  coffee only after 9:00pm the night before test Drink 2 glasses of water the morning of test - Patient Instructions: This bloodwork is non-fasting  Please drink two glasses of   water morning of bloodwork  Patients undergoing fluorescein dye angiography may retain small amounts of fluorescein in the body for 48-72 hours post procedure  Samples containing fluorescein can produce falsely depressed TSH values  If the patient had this procedure,a specimen should be resubmitted post fluorescein clearance  (1) PSA (SCREEN) (Dx V76 44 Screen for Prostate Cancer) 01Sep2016 07:58AM Nalini Reyes Order Number: [id number]     Test Name Result Flag Reference   PROSTATE SPECIFIC ANTIGEN 0 9 ng/mL  0 0-4 0   - Patient Instructions: This test is non-fasting  Please drink two glasses of water morning of bloodwork  - Patient Instructions: This test is non-fasting  Please drink two glasses of water morning of bloodwork  Plan  GERD without esophagitis    · (1) CBC/PLT/DIFF; Status:Active; Requested for:09Nov2016;     Discussion/Summary   Labs are stable- we need to watch platelet count;  Limit alcohol  We will repeat blood count  in 2 months

## 2018-01-14 NOTE — RESULT NOTES
Verified Results  (1) TISSUE EXAM 21Apr2016 12:00AM Conrad Grimes     Test Name Result Flag Reference   LAB AP CASE REPORT (Report)     Surgical Pathology Report             Case: [id number]                   Authorizing Provider: Hilliards Avery, DO      Collected:      04/21/2016           Pathologist:      Samual Ormond, MD      Received:      04/22/2016 1047        Specimen:  Skin, Other, Right upper chest   LAB AP FINAL DIAGNOSIS      A  Skin, right upper chest, shave excision:  - Seborrheic keratosis, inflamed/irritated/lichenoid and hyperkeratotic  ???         Interpretation performed at [address]   [id number]  LAB AP SURGICAL ADDITIONAL INFORMATION (Report)     These tests were developed and their performance characteristics   determined by [facility] or [facility]  They may not be cleared or approved by the [place]  Food and   Drug Administration  The FDA has determined that such clearance or   approval is not necessary  These tests are used for clinical purposes  They should not be regarded as investigational or for research  This   laboratory has been approved by [facility], designated as a high-complexity   laboratory and is qualified to perform these tests  LAB AP GROSS DESCRIPTION (Report)     A  The specimen is received in formalin, labeled with the patient's name   and hospital number, and is designated shave excision right upper chest    The specimen consists of a tan superficial shave of skin measuring 0 7 x   0 7 x 0 1 cm  The skin surface exhibits a friable keratotic appearing tan   lesion which overhangs the shaved margin, measuring approximately 1 2 x 1   x 0 4 cm  The margin is inked blue  The skin surface is inked red  The   specimen is trisected lengthwise  Entirely submitted  One cassette      Note: The estimated total formalin fixation time based upon information   provided by the submitting clinician and the standard processing schedule   is over 72 hours   MAC

## 2018-02-22 ENCOUNTER — TELEPHONE (OUTPATIENT)
Dept: FAMILY MEDICINE CLINIC | Facility: CLINIC | Age: 73
End: 2018-02-22

## 2018-02-22 DIAGNOSIS — K21.9 GASTROESOPHAGEAL REFLUX DISEASE WITHOUT ESOPHAGITIS: ICD-10-CM

## 2018-02-22 DIAGNOSIS — E78.01 FAMILIAL HYPERCHOLESTEROLEMIA: Primary | ICD-10-CM

## 2018-02-22 DIAGNOSIS — I25.10 CORONARY ARTERY DISEASE INVOLVING NATIVE CORONARY ARTERY OF NATIVE HEART WITHOUT ANGINA PECTORIS: ICD-10-CM

## 2018-02-22 DIAGNOSIS — Z12.5 PROSTATE CANCER SCREENING: ICD-10-CM

## 2018-03-07 ENCOUNTER — OFFICE VISIT (OUTPATIENT)
Dept: URGENT CARE | Age: 73
End: 2018-03-07
Payer: COMMERCIAL

## 2018-03-07 VITALS
BODY MASS INDEX: 31.1 KG/M2 | SYSTOLIC BLOOD PRESSURE: 143 MMHG | TEMPERATURE: 98.1 F | HEART RATE: 80 BPM | DIASTOLIC BLOOD PRESSURE: 70 MMHG | HEIGHT: 69 IN | RESPIRATION RATE: 20 BRPM | WEIGHT: 210 LBS | OXYGEN SATURATION: 95 %

## 2018-03-07 DIAGNOSIS — J06.9 UPPER RESPIRATORY TRACT INFECTION, UNSPECIFIED TYPE: Primary | ICD-10-CM

## 2018-03-07 PROCEDURE — 99213 OFFICE O/P EST LOW 20 MIN: CPT | Performed by: FAMILY MEDICINE

## 2018-03-07 RX ORDER — AMOXICILLIN 500 MG/1
500 TABLET, FILM COATED ORAL 3 TIMES DAILY
Qty: 30 TABLET | Refills: 0 | Status: SHIPPED | OUTPATIENT
Start: 2018-03-07 | End: 2018-03-17

## 2018-03-07 NOTE — PROGRESS NOTES
[facility]        NAME: Ashley Melendez is a 67 y o  male  : 1945    MRN: [id number]  DATE: 2018  TIME: 11:27 AM    Assessment and Plan   Upper respiratory tract infection, unspecified type [J06 9]  1  Upper respiratory tract infection, unspecified type  amoxicillin (AMOXIL) 500 MG tablet         Patient Instructions     Patient Instructions   Take antibiotic as directed  May continue Mucinex DM as needed  Push fluids  If develops any significant chest pain or shortness of breath, seek immediate emergency medical attention  Chief Complaint     Chief Complaint   Patient presents with    Cold Like Symptoms     x friday         History of Present Illness   Ashley Melendez presents to the clinic c/o    17-year-old male comes in with sore throat nasal congestion cough muscle and joint aches and pains in decreased energy  Started last Friday  He has been taking Mucinex DM without much relief  Some chest discomfort with cough but no gross shortness of breath  History of coronary artery disease that he says is stable  No pedal edema  Review of Systems   Review of Systems   Constitutional: Positive for activity change, appetite change and fatigue  Negative for chills and fever  HENT: Positive for congestion, postnasal drip, rhinorrhea, sinus pain, sinus pressure and sore throat  Negative for ear discharge and ear pain  Eyes: Negative  Respiratory: Positive for cough and chest tightness  Negative for shortness of breath, wheezing and stridor  Cardiovascular: Negative  Gastrointestinal: Negative  Musculoskeletal: Positive for arthralgias and myalgias  Negative for neck pain  Neurological: Positive for headaches  Hematological: Negative for adenopathy           Current Medications     Long-Term Prescriptions   Medication Sig Dispense Refill    aspirin (ECOTRIN) 325 mg EC tablet Take 325 mg by mouth every 6 (six) hours as needed for mild pain      atenolol (TENORMIN) 25 mg tablet Take 25 mg by mouth daily      clopidogrel (PLAVIX) 75 mg tablet Take 75 mg by mouth daily      ezetimibe-simvastatin (VYTORIN) 10-20 mg per tablet Take 1 tablet by mouth daily at bedtime      omega-3-acid ethyl esters (LOVAZA) 1 g capsule Take 2 g by mouth 2 (two) times a day      omeprazole (PriLOSEC) 20 mg delayed release capsule Take 20 mg by mouth daily      PARoxetine (PAXIL) 10 mg tablet Take 10 mg by mouth daily      valsartan-hydrochlorothiazide (DIOVAN-HCT) 160-12 5 MG per tablet Take 1 tablet by mouth daily         Current Allergies     Allergies as of 03/07/2018 - Reviewed 03/07/2018   Allergen Reaction Noted    Atorvastatin  01/20/2015            The following portions of the patient's history were reviewed and updated as appropriate: allergies, current medications, past family history, past medical history, past social history, past surgical history and problem list     Objective   /70   Pulse 80   Temp 98 1 °F (36 7 °C) (Temporal)   Resp 20   Ht 5' 9" (1 753 m)   Wt 95 3 kg (210 lb)   SpO2 95%   BMI 31 01 kg/m²        Physical Exam     Physical Exam   Constitutional: He is oriented to person, place, and time  He appears well-developed and well-nourished  No distress  HENT:   Head: Normocephalic and atraumatic  Right Ear: External ear normal    Left Ear: External ear normal    Nasal turbinates red and edematous with decreased patency  Cobblestoning and patchy redness of the posterior pharynx  Eyes: Conjunctivae are normal  Pupils are equal, round, and reactive to light  Right eye exhibits no discharge  Left eye exhibits no discharge  No scleral icterus  Neck: Normal range of motion  Neck supple  Cardiovascular: Normal rate, regular rhythm, normal heart sounds and intact distal pulses  Exam reveals no gallop and no friction rub  No murmur heard  Pulmonary/Chest: Effort normal and breath sounds normal  No respiratory distress   He has no wheezes  He has no rales  Lymphadenopathy:     He has no cervical adenopathy  Neurological: He is alert and oriented to person, place, and time  Skin: Skin is warm and dry  He is not diaphoretic  Psychiatric: He has a normal mood and affect  Nursing note and vitals reviewed

## 2018-03-07 NOTE — PATIENT INSTRUCTIONS
Take antibiotic as directed  May continue Mucinex DM as needed  Push fluids  If develops any significant chest pain or shortness of breath, seek immediate emergency medical attention

## 2018-03-08 ENCOUNTER — TELEPHONE (OUTPATIENT)
Dept: FAMILY MEDICINE CLINIC | Facility: CLINIC | Age: 73
End: 2018-03-08

## 2018-03-13 ENCOUNTER — TELEPHONE (OUTPATIENT)
Dept: CARDIOLOGY CLINIC | Facility: CLINIC | Age: 73
End: 2018-03-13

## 2018-03-13 NOTE — TELEPHONE ENCOUNTER
[facility] orthopedic surgeon at [facility]! Singh has recommended an arthrogram to be done asap  He is on plavix and aspirin daily  They would like a 7 day hold on both  Ok to hold?

## 2018-03-13 NOTE — TELEPHONE ENCOUNTER
Please call Anesthesiologist Dr Powell Fees    they want to do procedure today and want to discuss med hold cell#[phone]  Thanks

## 2018-03-27 DIAGNOSIS — E78.5 HYPERLIPIDEMIA, UNSPECIFIED HYPERLIPIDEMIA TYPE: Primary | ICD-10-CM

## 2018-03-27 RX ORDER — OMEGA-3-ACID ETHYL ESTERS 1 G/1
CAPSULE, LIQUID FILLED ORAL
Qty: 360 CAPSULE | Refills: 1 | Status: SHIPPED | OUTPATIENT
Start: 2018-03-27 | End: 2018-09-24 | Stop reason: SDUPTHER

## 2018-04-18 ENCOUNTER — LAB (OUTPATIENT)
Dept: LAB | Facility: CLINIC | Age: 73
End: 2018-04-18
Payer: COMMERCIAL

## 2018-04-18 ENCOUNTER — TRANSCRIBE ORDERS (OUTPATIENT)
Dept: LAB | Facility: CLINIC | Age: 73
End: 2018-04-18

## 2018-04-18 DIAGNOSIS — E78.01 FAMILIAL HYPERCHOLESTEROLEMIA: ICD-10-CM

## 2018-04-18 DIAGNOSIS — K21.9 GASTROESOPHAGEAL REFLUX DISEASE WITHOUT ESOPHAGITIS: ICD-10-CM

## 2018-04-18 DIAGNOSIS — Z12.5 PROSTATE CANCER SCREENING: ICD-10-CM

## 2018-04-18 DIAGNOSIS — I25.10 CORONARY ARTERY DISEASE INVOLVING NATIVE CORONARY ARTERY OF NATIVE HEART WITHOUT ANGINA PECTORIS: ICD-10-CM

## 2018-04-18 LAB
ALBUMIN SERPL BCP-MCNC: 3.5 G/DL (ref 3.5–5)
ALP SERPL-CCNC: 100 U/L (ref 46–116)
ALT SERPL W P-5'-P-CCNC: 28 U/L (ref 12–78)
ANION GAP SERPL CALCULATED.3IONS-SCNC: 9 MMOL/L (ref 4–13)
AST SERPL W P-5'-P-CCNC: 27 U/L (ref 5–45)
BASOPHILS # BLD AUTO: 0.04 THOUSANDS/ΜL (ref 0–0.1)
BASOPHILS NFR BLD AUTO: 1 % (ref 0–1)
BILIRUB SERPL-MCNC: 0.67 MG/DL (ref 0.2–1)
BUN SERPL-MCNC: 12 MG/DL (ref 5–25)
CALCIUM SERPL-MCNC: 8.9 MG/DL (ref 8.3–10.1)
CHLORIDE SERPL-SCNC: 107 MMOL/L (ref 100–108)
CHOLEST SERPL-MCNC: 125 MG/DL (ref 50–200)
CO2 SERPL-SCNC: 26 MMOL/L (ref 21–32)
CREAT SERPL-MCNC: 0.81 MG/DL (ref 0.6–1.3)
EOSINOPHIL # BLD AUTO: 0.74 THOUSAND/ΜL (ref 0–0.61)
EOSINOPHIL NFR BLD AUTO: 12 % (ref 0–6)
ERYTHROCYTE [DISTWIDTH] IN BLOOD BY AUTOMATED COUNT: 12.8 % (ref 11.6–15.1)
GFR SERPL CREATININE-BSD FRML MDRD: 89 ML/MIN/1.73SQ M
GLUCOSE P FAST SERPL-MCNC: 100 MG/DL (ref 65–99)
HCT VFR BLD AUTO: 41.1 % (ref 36.5–49.3)
HDLC SERPL-MCNC: 49 MG/DL (ref 40–60)
HGB BLD-MCNC: 14.3 G/DL (ref 12–17)
LDLC SERPL CALC-MCNC: 36 MG/DL (ref 0–100)
LYMPHOCYTES # BLD AUTO: 1.99 THOUSANDS/ΜL (ref 0.6–4.47)
LYMPHOCYTES NFR BLD AUTO: 32 % (ref 14–44)
MCH RBC QN AUTO: 34 PG (ref 26.8–34.3)
MCHC RBC AUTO-ENTMCNC: 34.8 G/DL (ref 31.4–37.4)
MCV RBC AUTO: 98 FL (ref 82–98)
MONOCYTES # BLD AUTO: 0.47 THOUSAND/ΜL (ref 0.17–1.22)
MONOCYTES NFR BLD AUTO: 8 % (ref 4–12)
NEUTROPHILS # BLD AUTO: 3.06 THOUSANDS/ΜL (ref 1.85–7.62)
NEUTS SEG NFR BLD AUTO: 47 % (ref 43–75)
NONHDLC SERPL-MCNC: 76 MG/DL
NRBC BLD AUTO-RTO: 0 /100 WBCS
PLATELET # BLD AUTO: 132 THOUSANDS/UL (ref 149–390)
PMV BLD AUTO: 9.6 FL (ref 8.9–12.7)
POTASSIUM SERPL-SCNC: 3.9 MMOL/L (ref 3.5–5.3)
PROT SERPL-MCNC: 7.3 G/DL (ref 6.4–8.2)
PSA SERPL-MCNC: 0.6 NG/ML (ref 0–4)
RBC # BLD AUTO: 4.2 MILLION/UL (ref 3.88–5.62)
SODIUM SERPL-SCNC: 142 MMOL/L (ref 136–145)
TRIGL SERPL-MCNC: 200 MG/DL
WBC # BLD AUTO: 6.3 THOUSAND/UL (ref 4.31–10.16)

## 2018-04-18 PROCEDURE — 85025 COMPLETE CBC W/AUTO DIFF WBC: CPT

## 2018-04-18 PROCEDURE — 36415 COLL VENOUS BLD VENIPUNCTURE: CPT

## 2018-04-18 PROCEDURE — 80053 COMPREHEN METABOLIC PANEL: CPT

## 2018-04-18 PROCEDURE — G0103 PSA SCREENING: HCPCS

## 2018-04-18 PROCEDURE — 80061 LIPID PANEL: CPT

## 2018-04-29 DIAGNOSIS — K21.9 CHRONIC GERD: Primary | ICD-10-CM

## 2018-04-30 RX ORDER — PANTOPRAZOLE SODIUM 40 MG/1
TABLET, DELAYED RELEASE ORAL
Qty: 90 TABLET | Refills: 3 | Status: SHIPPED | OUTPATIENT
Start: 2018-04-30 | End: 2019-04-15 | Stop reason: SDUPTHER

## 2018-05-08 ENCOUNTER — TELEPHONE (OUTPATIENT)
Dept: CARDIOLOGY CLINIC | Facility: CLINIC | Age: 73
End: 2018-05-08

## 2018-05-08 NOTE — TELEPHONE ENCOUNTER
Received a fax from [facility]  66  asking permission for pt to hold Plavix 7 days prior to MRI arthrogram/CT arthrogram  Please advise  QIR#[phone]

## 2018-05-09 ENCOUNTER — TELEPHONE (OUTPATIENT)
Dept: FAMILY MEDICINE CLINIC | Facility: CLINIC | Age: 73
End: 2018-05-09

## 2018-05-09 ENCOUNTER — OFFICE VISIT (OUTPATIENT)
Dept: FAMILY MEDICINE CLINIC | Facility: CLINIC | Age: 73
End: 2018-05-09
Payer: COMMERCIAL

## 2018-05-09 VITALS
SYSTOLIC BLOOD PRESSURE: 138 MMHG | BODY MASS INDEX: 30.24 KG/M2 | DIASTOLIC BLOOD PRESSURE: 74 MMHG | HEART RATE: 58 BPM | WEIGHT: 204.8 LBS | RESPIRATION RATE: 18 BRPM | TEMPERATURE: 98 F

## 2018-05-09 DIAGNOSIS — I77.9 BILATERAL CAROTID ARTERY DISEASE (HCC): ICD-10-CM

## 2018-05-09 DIAGNOSIS — I10 ESSENTIAL HYPERTENSION: ICD-10-CM

## 2018-05-09 DIAGNOSIS — E78.01 FAMILIAL HYPERCHOLESTEROLEMIA: ICD-10-CM

## 2018-05-09 DIAGNOSIS — I25.10 CORONARY ARTERY DISEASE INVOLVING NATIVE HEART WITHOUT ANGINA PECTORIS, UNSPECIFIED VESSEL OR LESION TYPE: Primary | ICD-10-CM

## 2018-05-09 DIAGNOSIS — I25.10 ARTERIOSCLEROTIC CARDIOVASCULAR DISEASE: ICD-10-CM

## 2018-05-09 PROCEDURE — 99214 OFFICE O/P EST MOD 30 MIN: CPT | Performed by: INTERNAL MEDICINE

## 2018-05-09 NOTE — TELEPHONE ENCOUNTER
Call pt  - he can stop plavix and lower dose of aspirin to 81 mg - great news!      ----- Message from Montez Perera MD sent at 5/9/2018 10:08 AM EDT -----  Aleisha Harp  I had told him previously that he could take aspirin 81 mg per day  In reference to Plavix okay to discontinue from my point of view  Have a good day  Thank you         ----- Message -----  From: Brent Trevino DO  Sent: 5/9/2018   9:54 AM  To: Montez Perera MD    Hi, I saw Nany Gorman today- he will be scheduled with you in August- on review of his records, he is on Plavix daily and asa 325- his stents were placed in 2004- last echo was stable and stress was stable 2 years ago  Can we change his regimen at this point and eliminate one or the other or adjust the dose? Let me know and I will be in touch with him and he can follow up  With you this summer- thanks for your help!!     Yobany Gotti

## 2018-05-09 NOTE — PROGRESS NOTES
ASSESSMENT and PLAN:  Che Cowan is a 67 y o  male with:   Problem List Items Addressed This Visit     Coronary artery disease involving native heart without angina pectoris - Primary    Relevant Medications    aspirin (ECOTRIN) 325 mg EC tablet    Other Relevant Orders    CBC and differential    Hyperlipidemia    Relevant Orders    Comprehensive metabolic panel    Lipid panel    Hypertension    Relevant Orders    Comprehensive metabolic panel    Bilateral carotid artery disease (HCC)    Relevant Orders    VAS carotid complete study    Comprehensive metabolic panel    Lipid panel          SUBJECTIVE:  Che Cowan is a 67 y o  male who presents today with a chief complaint of Follow-up    Patient  Here for follow up  He feels well  Review of Systems   Constitutional: Negative  HENT: Negative  Eyes: Negative  Respiratory: Negative  Cardiovascular: Negative  Gastrointestinal: Negative  Endocrine: Negative  Genitourinary: Negative  Musculoskeletal: Positive for arthralgias (shoulder pain)  Negative for back pain, gait problem, joint swelling and myalgias  Skin: Negative  Allergic/Immunologic: Negative  Neurological: Negative  Hematological: Negative  Psychiatric/Behavioral: Negative  I have reviewed the patient's PMH, Social History, Medication List and Allergies  OBJECTIVE:  /74 (BP Location: Left arm, Patient Position: Sitting, Cuff Size: Large)   Pulse 58   Temp 98 °F (36 7 °C) (Tympanic)   Resp 18   Wt 92 9 kg (204 lb 12 8 oz)   BMI 30 24 kg/m²   Physical Exam   Constitutional: He is oriented to person, place, and time  He appears well-developed and well-nourished  HENT:   Head: Normocephalic and atraumatic  Right Ear: External ear normal    Left Ear: External ear normal    Nose: Nose normal    Eyes: Conjunctivae and EOM are normal  Pupils are equal, round, and reactive to light  Left eye exhibits no discharge     Neck: Normal range of motion  Neck supple  No JVD present  No tracheal deviation present  No thyromegaly present  Cardiovascular: Normal rate, regular rhythm, normal heart sounds and intact distal pulses  Exam reveals no friction rub  No murmur heard  Pulmonary/Chest: Effort normal and breath sounds normal  No respiratory distress  He has no wheezes  He has no rales  Abdominal: Soft  Bowel sounds are normal  He exhibits no distension  There is no tenderness  There is no rebound  Musculoskeletal: Normal range of motion  He exhibits tenderness (pain left shoulder with a and p rom;  pos empty can sign on left)  He exhibits no edema  Neurological: He is alert and oriented to person, place, and time  He has normal reflexes  No cranial nerve deficit  He exhibits normal muscle tone  Skin: Skin is warm and dry  Psychiatric: He has a normal mood and affect  Judgment and thought content normal    Nursing note and vitals reviewed

## 2018-05-11 ENCOUNTER — TELEPHONE (OUTPATIENT)
Dept: CARDIOLOGY CLINIC | Facility: CLINIC | Age: 73
End: 2018-05-11

## 2018-05-11 NOTE — TELEPHONE ENCOUNTER
Now that medications have changed, I need a new form stating how long pt can stop ASA 81mg prior to MRI/CT arthrogram? Please advise, thanks

## 2018-05-22 ENCOUNTER — TELEPHONE (OUTPATIENT)
Dept: FAMILY MEDICINE CLINIC | Facility: CLINIC | Age: 73
End: 2018-05-22

## 2018-05-22 NOTE — TELEPHONE ENCOUNTER
Patient's wife informed  She stated that her question was that since the Plavix and Aspirin are discontinued, she is inquiring if Grady Live is able to take Ibuprofen after surgery?

## 2018-05-22 NOTE — TELEPHONE ENCOUNTER
The surgeon does not want him to have any anti inflammatories but if he is really in a lot of pain one dose will be ok

## 2018-05-22 NOTE — TELEPHONE ENCOUNTER
Pts wife calling to ask if since the pt has been off of his plavix and 325mg aspirin for three weeks,  can he take ibuprofen for post op shoulder pain  Pt is scheduled for surgery on Friday

## 2018-06-03 DIAGNOSIS — I10 ESSENTIAL HYPERTENSION: Primary | ICD-10-CM

## 2018-06-04 RX ORDER — VALSARTAN AND HYDROCHLOROTHIAZIDE 160; 12.5 MG/1; MG/1
TABLET, FILM COATED ORAL
Qty: 90 TABLET | Refills: 1 | Status: SHIPPED | OUTPATIENT
Start: 2018-06-04 | End: 2018-11-27 | Stop reason: SDUPTHER

## 2018-07-24 DIAGNOSIS — E78.00 PURE HYPERCHOLESTEROLEMIA: Primary | ICD-10-CM

## 2018-07-24 RX ORDER — EZETIMIBE AND SIMVASTATIN 10; 20 MG/1; MG/1
TABLET ORAL
Qty: 90 TABLET | Refills: 2 | Status: SHIPPED | OUTPATIENT
Start: 2018-07-24 | End: 2019-04-15

## 2018-08-17 DIAGNOSIS — I10 ESSENTIAL HYPERTENSION: Primary | ICD-10-CM

## 2018-08-17 DIAGNOSIS — I10 ESSENTIAL HYPERTENSION: ICD-10-CM

## 2018-08-17 RX ORDER — ATENOLOL 25 MG/1
TABLET ORAL
Qty: 90 TABLET | Refills: 3 | Status: SHIPPED | OUTPATIENT
Start: 2018-08-17 | End: 2018-08-17 | Stop reason: SDUPTHER

## 2018-08-19 RX ORDER — ATENOLOL 25 MG/1
25 TABLET ORAL DAILY
Qty: 90 TABLET | Refills: 0 | Status: SHIPPED | OUTPATIENT
Start: 2018-08-19 | End: 2019-11-04 | Stop reason: SDUPTHER

## 2018-08-24 ENCOUNTER — APPOINTMENT (OUTPATIENT)
Dept: LAB | Facility: CLINIC | Age: 73
End: 2018-08-24
Payer: COMMERCIAL

## 2018-08-24 DIAGNOSIS — I77.9 BILATERAL CAROTID ARTERY DISEASE (HCC): ICD-10-CM

## 2018-08-24 DIAGNOSIS — I25.10 ARTERIOSCLEROTIC CARDIOVASCULAR DISEASE: ICD-10-CM

## 2018-08-24 DIAGNOSIS — I25.10 CORONARY ARTERY DISEASE INVOLVING NATIVE HEART WITHOUT ANGINA PECTORIS, UNSPECIFIED VESSEL OR LESION TYPE: ICD-10-CM

## 2018-08-24 DIAGNOSIS — I10 ESSENTIAL HYPERTENSION: ICD-10-CM

## 2018-08-24 DIAGNOSIS — E78.01 FAMILIAL HYPERCHOLESTEROLEMIA: ICD-10-CM

## 2018-08-24 LAB
ALBUMIN SERPL BCP-MCNC: 3.5 G/DL (ref 3.5–5)
ALP SERPL-CCNC: 91 U/L (ref 46–116)
ALT SERPL W P-5'-P-CCNC: 37 U/L (ref 12–78)
ANION GAP SERPL CALCULATED.3IONS-SCNC: 5 MMOL/L (ref 4–13)
AST SERPL W P-5'-P-CCNC: 33 U/L (ref 5–45)
BASOPHILS # BLD AUTO: 0.07 THOUSANDS/ΜL (ref 0–0.1)
BASOPHILS NFR BLD AUTO: 1 % (ref 0–1)
BILIRUB SERPL-MCNC: 0.67 MG/DL (ref 0.2–1)
BUN SERPL-MCNC: 14 MG/DL (ref 5–25)
CALCIUM SERPL-MCNC: 8.6 MG/DL (ref 8.3–10.1)
CHLORIDE SERPL-SCNC: 103 MMOL/L (ref 100–108)
CHOLEST SERPL-MCNC: 119 MG/DL (ref 50–200)
CO2 SERPL-SCNC: 30 MMOL/L (ref 21–32)
CREAT SERPL-MCNC: 0.84 MG/DL (ref 0.6–1.3)
EOSINOPHIL # BLD AUTO: 0.99 THOUSAND/ΜL (ref 0–0.61)
EOSINOPHIL NFR BLD AUTO: 16 % (ref 0–6)
ERYTHROCYTE [DISTWIDTH] IN BLOOD BY AUTOMATED COUNT: 13.1 % (ref 11.6–15.1)
GFR SERPL CREATININE-BSD FRML MDRD: 87 ML/MIN/1.73SQ M
GLUCOSE P FAST SERPL-MCNC: 113 MG/DL (ref 65–99)
HCT VFR BLD AUTO: 43.3 % (ref 36.5–49.3)
HDLC SERPL-MCNC: 46 MG/DL (ref 40–60)
HGB BLD-MCNC: 14.2 G/DL (ref 12–17)
IMM GRANULOCYTES # BLD AUTO: 0.02 THOUSAND/UL (ref 0–0.2)
IMM GRANULOCYTES NFR BLD AUTO: 0 % (ref 0–2)
LDLC SERPL CALC-MCNC: 30 MG/DL (ref 0–100)
LYMPHOCYTES # BLD AUTO: 1.97 THOUSANDS/ΜL (ref 0.6–4.47)
LYMPHOCYTES NFR BLD AUTO: 32 % (ref 14–44)
MCH RBC QN AUTO: 32.3 PG (ref 26.8–34.3)
MCHC RBC AUTO-ENTMCNC: 32.8 G/DL (ref 31.4–37.4)
MCV RBC AUTO: 99 FL (ref 82–98)
MONOCYTES # BLD AUTO: 0.57 THOUSAND/ΜL (ref 0.17–1.22)
MONOCYTES NFR BLD AUTO: 9 % (ref 4–12)
NEUTROPHILS # BLD AUTO: 2.63 THOUSANDS/ΜL (ref 1.85–7.62)
NEUTS SEG NFR BLD AUTO: 42 % (ref 43–75)
NONHDLC SERPL-MCNC: 73 MG/DL
NRBC BLD AUTO-RTO: 0 /100 WBCS
PLATELET # BLD AUTO: 144 THOUSANDS/UL (ref 149–390)
PMV BLD AUTO: 9.9 FL (ref 8.9–12.7)
POTASSIUM SERPL-SCNC: 3.9 MMOL/L (ref 3.5–5.3)
PROT SERPL-MCNC: 7.4 G/DL (ref 6.4–8.2)
RBC # BLD AUTO: 4.39 MILLION/UL (ref 3.88–5.62)
SODIUM SERPL-SCNC: 138 MMOL/L (ref 136–145)
TRIGL SERPL-MCNC: 214 MG/DL
WBC # BLD AUTO: 6.25 THOUSAND/UL (ref 4.31–10.16)

## 2018-08-24 PROCEDURE — 80053 COMPREHEN METABOLIC PANEL: CPT

## 2018-08-24 PROCEDURE — 80061 LIPID PANEL: CPT

## 2018-08-24 PROCEDURE — 36415 COLL VENOUS BLD VENIPUNCTURE: CPT

## 2018-08-24 PROCEDURE — 85025 COMPLETE CBC W/AUTO DIFF WBC: CPT

## 2018-08-30 ENCOUNTER — OFFICE VISIT (OUTPATIENT)
Dept: CARDIOLOGY CLINIC | Facility: CLINIC | Age: 73
End: 2018-08-30
Payer: COMMERCIAL

## 2018-08-30 VITALS
HEART RATE: 66 BPM | DIASTOLIC BLOOD PRESSURE: 80 MMHG | BODY MASS INDEX: 31.7 KG/M2 | HEIGHT: 69 IN | WEIGHT: 214 LBS | SYSTOLIC BLOOD PRESSURE: 128 MMHG

## 2018-08-30 DIAGNOSIS — E78.00 PURE HYPERCHOLESTEROLEMIA: ICD-10-CM

## 2018-08-30 DIAGNOSIS — I25.10 CORONARY ARTERIOSCLEROSIS: ICD-10-CM

## 2018-08-30 DIAGNOSIS — I10 ESSENTIAL (PRIMARY) HYPERTENSION: Primary | ICD-10-CM

## 2018-08-30 PROCEDURE — 99213 OFFICE O/P EST LOW 20 MIN: CPT | Performed by: INTERNAL MEDICINE

## 2018-08-30 PROCEDURE — 93000 ELECTROCARDIOGRAM COMPLETE: CPT | Performed by: INTERNAL MEDICINE

## 2018-08-30 RX ORDER — IBUPROFEN 800 MG/1
TABLET ORAL
Refills: 0 | COMMUNITY
Start: 2018-06-29 | End: 2022-08-05

## 2018-08-30 NOTE — PROGRESS NOTES
Cardiology Follow Up    Josh Gorman  1945  [id number]  [address]    1  Essential (primary) hypertension     2  Pure hypercholesterolemia     3  Coronary arteriosclerosis  POCT ECG       Interval History:  Patient is here for a follow-up visit  He was last seen by me in August of last year  Since that time he has been well  He has a prior history of PCI in the setting of myocardial infarction  His most recent echocardiogram done April 2015 demonstrated preserved LV systolic function with mild LVH and no significant valvular heart disease  A pharmacologic nuclear stress test done January of 2015 demonstrated no evidence of prognostically important ischemia  He has been doing well  He has had no chest pain or significant dyspnea  He had two rotator cuff surgeries this year  His EKG today looks normal  He recently had blood work which looked good except for a mild elevation in blood sugar      Patient Active Problem List   Diagnosis    Coronary artery disease involving native heart without angina pectoris    CAD (coronary atherosclerotic disease)    Hyperlipidemia    Hypertension    Bilateral carotid artery disease ([address] )     Past Medical History:   Diagnosis Date    Abnormal electrocardiography     resolved: 11/21/2017    Abnormal laboratory test     last assessed: 11/16/2016    Anxiety     Community acquired pneumonia     last assessed: 10/15/2015    Complete tear of left rotator cuff     unspecified laterality    Coronary artery disease     Depression     GERD (gastroesophageal reflux disease)     Hyperlipidemia     Hypertension     Inguinal mass     last assessed: 1/21/2015    Myocardial infarction [facility])     Nontraumatic rupture of tendons of biceps (long head), left     last assessed: 1/2/2017    Right trigger finger     last assessed: 1/13/2015    Skin lesion     last assessed: 4/21/2016    Skin lesion of cheek     last assessed: 2/25/2016     Social History     Social History    Marital status: /Civil Union     Spouse name: N/A    Number of children: N/A    Years of education: N/A     Occupational History    Not on file  Social History Main Topics    Smoking status: Former Smoker     Quit date: 1978    Smokeless tobacco: Never Used    Alcohol use 1 2 oz/week     2 Shots of liquor per week      Comment: 2 martinis/ day;  social alcohol use (as per allscripts)    Drug use: No    Sexual activity: Not on file     Other Topics Concern    Not on file     Social History Narrative    No narrative on file      Family History   Problem Relation Age of Onset    Other Mother         brain tumor    Coronary artery disease Mother     No Known Problems Father      Past Surgical History:   Procedure Laterality Date    CAROTID STENT      transcath placement of carotid artery stent    COLONOSCOPY      CORONARY ANGIOPLASTY      NV COLONOSCOPY FLX DX W/COLLJ SPEC WHEN PFRMD N/A 11/3/2017    Procedure: COLONOSCOPY;  Surgeon: Ekta Ortiz MD;  [facility]; Service: Colorectal    ROTATOR CUFF REPAIR Bilateral     SHOULDER SURGERY         Current Outpatient Prescriptions:     aspirin (ECOTRIN) 325 mg EC tablet, Take 1 tablet (325 mg total) by mouth once for 1 dose, Disp: , Rfl: 0    atenolol (TENORMIN) 25 mg tablet, Take 1 tablet (25 mg total) by mouth daily, Disp: 90 tablet, Rfl: 0    clopidogrel (PLAVIX) 75 mg tablet, Take 75 mg by mouth daily, Disp: , Rfl:     Coenzyme Q10 75 MG CAPS, Take by mouth 2 (two) times a week, Disp: , Rfl:     ezetimibe-simvastatin (VYTORIN) 10-20 mg per tablet, TAKE 1 TABLET DAILY  , Disp: 90 tablet, Rfl: 2    Multiple Vitamins-Minerals (ONE-A-DAY 50 PLUS PO), Take 1 tablet by mouth daily, Disp: , Rfl:     omega-3-acid ethyl esters (LOVAZA) 1 g capsule, TAKE 2 CAPSULES TWICE A DAY, Disp: 360 capsule, Rfl: 1    pantoprazole (PROTONIX) 40 mg tablet, TAKE 1 TABLET BY MOUTH EVERY DAY, Disp: 90 tablet, Rfl: 3    PARoxetine (PAXIL) 10 mg tablet, Take 10 mg by mouth daily, Disp: , Rfl:     valsartan-hydrochlorothiazide (DIOVAN-HCT) 160-12 5 MG per tablet, TAKE 1 TABLET EVERY DAY, Disp: 90 tablet, Rfl: 1  Allergies   Allergen Reactions    Atorvastatin        Labs:not applicable  Imaging: No results found  Review of Systems:  Review of Systems   All other systems reviewed and are negative  Physical Exam:  Physical Exam   Constitutional: He is oriented to person, place, and time  He appears well-developed and well-nourished  HENT:   Head: Normocephalic and atraumatic  Eyes: Conjunctivae are normal  Pupils are equal, round, and reactive to light  Neck: Normal range of motion  Neck supple  Cardiovascular: Normal rate and normal heart sounds  Pulmonary/Chest: Effort normal and breath sounds normal    Neurological: He is alert and oriented to person, place, and time  Skin: Skin is warm and dry  Psychiatric: He has a normal mood and affect  Vitals reviewed  Discussion/Summary:I will continue the patient's present medical regimen  Patient appears well compensated  I have asked the patient to call if there is a problem in the interim otherwise I will see the patient in one years time

## 2018-09-06 ENCOUNTER — OFFICE VISIT (OUTPATIENT)
Dept: URGENT CARE | Age: 73
End: 2018-09-06
Payer: COMMERCIAL

## 2018-09-06 VITALS
HEART RATE: 71 BPM | DIASTOLIC BLOOD PRESSURE: 67 MMHG | SYSTOLIC BLOOD PRESSURE: 148 MMHG | OXYGEN SATURATION: 95 % | WEIGHT: 210 LBS | TEMPERATURE: 98.7 F | RESPIRATION RATE: 18 BRPM | HEIGHT: 69 IN | BODY MASS INDEX: 31.1 KG/M2

## 2018-09-06 DIAGNOSIS — M79.89 SWELLING OF RIGHT MIDDLE FINGER: Primary | ICD-10-CM

## 2018-09-06 PROCEDURE — 99211 OFF/OP EST MAY X REQ PHY/QHP: CPT | Performed by: FAMILY MEDICINE

## 2018-09-06 NOTE — PATIENT INSTRUCTIONS
Continue doing the warm soaks    Follow-up with the hand specialist as directed    If you cannot get in to see the hand specialists and the symptoms are worsening go to emergency room for further evaluation

## 2018-09-06 NOTE — PROGRESS NOTES
[facility]        NAME: Jacinto Horton is a 68 y o  male  : 1945    MRN: [id number]  DATE: 2018  TIME: 3:01 PM    Assessment and Plan   Swelling of right middle finger [M79 89]  1  Swelling of right middle finger           Patient Instructions       Follow up with PCP in 3-5 days  Proceed to  ER if symptoms worsen  Chief Complaint     Chief Complaint   Patient presents with    Hand Pain     Pt states 1 week ago he noticed swollen sore to right 3rd finger, since states "i feel like there is something in there that wont come out"  History of Present Illness       Patient here for swelling of his right middle finger  Patient noted this about a week ago and has been doing warm soaks and trying to drainage  He states that uncomfortable but denies any redness or known injury  He is not sure he if he got anything in the finger like a splinter or other foreign body  Review of Systems   Review of Systems   Constitutional: Negative  Current Medications       Current Outpatient Prescriptions:     aspirin (ECOTRIN) 325 mg EC tablet, Take 1 tablet (325 mg total) by mouth once for 1 dose (Patient taking differently: Take 81 mg by mouth once  ), Disp: , Rfl: 0    atenolol (TENORMIN) 25 mg tablet, Take 1 tablet (25 mg total) by mouth daily, Disp: 90 tablet, Rfl: 0    Coenzyme Q10 75 MG CAPS, Take by mouth 2 (two) times a week, Disp: , Rfl:     ezetimibe-simvastatin (VYTORIN) 10-20 mg per tablet, TAKE 1 TABLET DAILY  , Disp: 90 tablet, Rfl: 2    ibuprofen (MOTRIN) 800 mg tablet, TAKE 1 TABLET BY MOUTH EVERY 8 HOURS WITH FOOD AS NEEDED, Disp: , Rfl: 0    Multiple Vitamins-Minerals (ONE-A-DAY 50 PLUS PO), Take 1 tablet by mouth daily, Disp: , Rfl:     omega-3-acid ethyl esters (LOVAZA) 1 g capsule, TAKE 2 CAPSULES TWICE A DAY, Disp: 360 capsule, Rfl: 1    pantoprazole (PROTONIX) 40 mg tablet, TAKE 1 TABLET BY MOUTH EVERY DAY, Disp: 90 tablet, Rfl: 3   PARoxetine (PAXIL) 10 mg tablet, Take 10 mg by mouth daily, Disp: , Rfl:     valsartan-hydrochlorothiazide (DIOVAN-HCT) 160-12 5 MG per tablet, TAKE 1 TABLET EVERY DAY, Disp: 90 tablet, Rfl: 1    Current Allergies     Allergies as of 09/06/2018 - Reviewed 09/06/2018   Allergen Reaction Noted    Atorvastatin  01/20/2015            The following portions of the patient's history were reviewed and updated as appropriate: allergies, current medications, past family history, past medical history, past social history, past surgical history and problem list      Past Medical History:   Diagnosis Date    Abnormal electrocardiography     resolved: 11/21/2017    Abnormal laboratory test     last assessed: 11/16/2016    Anxiety     Community acquired pneumonia     last assessed: 10/15/2015    Complete tear of left rotator cuff     unspecified laterality    Coronary artery disease     Depression     GERD (gastroesophageal reflux disease)     Hyperlipidemia     Hypertension     Inguinal mass     last assessed: 1/21/2015    Myocardial infarction ([facility] [address] )     Nontraumatic rupture of tendons of biceps (long head), left     last assessed: 1/2/2017    Right trigger finger     last assessed: 1/13/2015    Skin lesion     last assessed: 4/21/2016    Skin lesion of cheek     last assessed: 2/25/2016       Past Surgical History:   Procedure Laterality Date    CAROTID STENT      transcath placement of carotid artery stent    COLONOSCOPY      CORONARY ANGIOPLASTY      MD COLONOSCOPY FLX DX W/COLLJ SPEC WHEN PFRMD N/A 11/3/2017    Procedure: COLONOSCOPY;  Surgeon: Samara Hammonds MD;  [facility]; Service: Colorectal    ROTATOR CUFF REPAIR Bilateral     SHOULDER SURGERY         Family History   Problem Relation Age of Onset    Other Mother         brain tumor    Coronary artery disease Mother     No Known Problems Father          Medications have been verified          Objective   /67 (BP Location: Left arm)   Pulse 71   Temp 98 7 °F (37 1 °C) (Temporal)   Resp 18   Ht 5' 9" (1 753 m)   Wt 95 3 kg (210 lb)   SpO2 95%   BMI 31 01 kg/m²        Physical Exam     Physical Exam   Constitutional: He is oriented to person, place, and time  He appears well-developed and well-nourished  No distress  Neurological: He is alert and oriented to person, place, and time  Skin: Skin is warm and dry  1 x 1 area of soft tissue swelling in the right middle finger with central scabbed area  No obvious foreign body noted  No erythema  No warmth  No induration  Range of motion of the right middle finger intact  Psychiatric: He has a normal mood and affect  His behavior is normal    Nursing note and vitals reviewed

## 2018-09-07 ENCOUNTER — OFFICE VISIT (OUTPATIENT)
Dept: FAMILY MEDICINE CLINIC | Facility: CLINIC | Age: 73
End: 2018-09-07
Payer: COMMERCIAL

## 2018-09-07 VITALS
WEIGHT: 213 LBS | RESPIRATION RATE: 16 BRPM | DIASTOLIC BLOOD PRESSURE: 76 MMHG | TEMPERATURE: 98 F | BODY MASS INDEX: 31.45 KG/M2 | HEART RATE: 62 BPM | SYSTOLIC BLOOD PRESSURE: 132 MMHG

## 2018-09-07 DIAGNOSIS — M67.449 MUCOUS CYST OF FINGER: Primary | ICD-10-CM

## 2018-09-07 PROCEDURE — 99213 OFFICE O/P EST LOW 20 MIN: CPT | Performed by: FAMILY MEDICINE

## 2018-09-07 PROCEDURE — 10140 I&D HMTMA SEROMA/FLUID COLLJ: CPT | Performed by: FAMILY MEDICINE

## 2018-09-07 RX ORDER — ASPIRIN 81 MG/1
81 TABLET ORAL DAILY
COMMUNITY
End: 2022-08-05

## 2018-09-07 NOTE — PROGRESS NOTES
FAMILY MEDICINE PROGRESS NOTE  Ann Arceo 68 y o  male   DATE: September 7, 2018     ASSESSMENT and PLAN:  Ann Arceo is a 68 y o  male with:     1  Mucous cyst of finger  Small, <2mm lesion on finger with small pustule with surrounding erythema  I&D performed as below, very minimal purulent drainage  Wound left open, no Abx, dressing with topical triple abx and keep open  RTC PRN next week if re-develops, may need deeper drainage    - Incision and Drainage      SUBJECTIVE:  Ann Arceo is a 68 y o  male who presents today with a chief complaint of Blister (right hand)  First appeared about 1 week ago and noticed a small bump on his right middle finger and his wife popped it with a needle and mostly blood and small amount of pus was expressed  It had slowly resolved as he was soaking it  It reappeared yesterday and it was red and sensitive, but today it is less so  Denies fevers, chills  Review of Systems   Constitutional:        See HPI for ROS     I have reviewed the patient's PMH, Social History, Medication List and Allergies as appropriate  OBJECTIVE:  /76   Pulse 62   Temp 98 °F (36 7 °C)   Resp 16   Wt 96 6 kg (213 lb)   BMI 31 45 kg/m²      Physical Exam   Constitutional: He appears well-developed and well-nourished  No distress  Musculoskeletal:        Hands:  Skin: He is not diaphoretic  Incision and Drainage  Date/Time: 9/7/2018 11:48 AM  Performed by: Shirin Gomez Authorized by: Shirin Gomez      Patient location:  Bedside  Other Assisting Provider: No    Consent:     Consent obtained:  Verbal    Consent given by:  Patient    Risks discussed:  Bleeding and incomplete drainage    Alternatives discussed:  No treatment and observation  Universal protocol:     Procedure explained and questions answered to patient or proxy's satisfaction: yes      Patient identity confirmed:  Verbally with patient  Location:     Type:  Fluid collection and seroma    Location: Upper extremity    Upper extremity location:  R long finger  Pre-procedure details:     Skin preparation:  Antiseptic wash  Anesthesia (see MAR for exact dosages): Anesthesia method:  None  Procedure details:     Complexity:  Simple    Needle aspiration: no      Incision types:  Stab incision    Scalpel blade:  11    Approach:  Open    Incision depth:  Skin    Wound management:  Probed and deloculated    Drainage:  Bloody and serosanguinous    Drainage amount:  Scant    Wound treatment:  Wound left open    Packing materials:  None  Post-procedure details:     Patient tolerance of procedure: Tolerated well, no immediate complications  Comments:      Small wound dressed with triple antibiotic and bandaid          Videhi LENORA Arceo MD    Note: Portions of the record may have been created with voice recognition software  Occasional wrong word or "sound a like" substitutions may have occurred due to the inherent limitations of voice recognition software  Read the chart carefully and recognize, using context, where substitutions have occurred

## 2018-09-22 DIAGNOSIS — E78.5 HYPERLIPIDEMIA, UNSPECIFIED HYPERLIPIDEMIA TYPE: ICD-10-CM

## 2018-09-22 DIAGNOSIS — F41.1 GAD (GENERALIZED ANXIETY DISORDER): Primary | ICD-10-CM

## 2018-09-24 DIAGNOSIS — E78.5 HYPERLIPIDEMIA, UNSPECIFIED HYPERLIPIDEMIA TYPE: ICD-10-CM

## 2018-09-24 RX ORDER — OMEGA-3-ACID ETHYL ESTERS 1 G/1
CAPSULE, LIQUID FILLED ORAL
Qty: 360 CAPSULE | Refills: 1 | OUTPATIENT
Start: 2018-09-24

## 2018-09-24 RX ORDER — OMEGA-3-ACID ETHYL ESTERS 1 G/1
2 CAPSULE, LIQUID FILLED ORAL 2 TIMES DAILY
Qty: 360 CAPSULE | Refills: 0 | Status: SHIPPED | OUTPATIENT
Start: 2018-09-24 | End: 2018-12-13 | Stop reason: SDUPTHER

## 2018-09-25 RX ORDER — PAROXETINE 10 MG/1
TABLET, FILM COATED ORAL
Qty: 90 TABLET | Refills: 1 | Status: SHIPPED | OUTPATIENT
Start: 2018-09-25 | End: 2019-03-23 | Stop reason: SDUPTHER

## 2018-10-03 ENCOUNTER — APPOINTMENT (EMERGENCY)
Dept: RADIOLOGY | Facility: HOSPITAL | Age: 73
End: 2018-10-03
Payer: COMMERCIAL

## 2018-10-03 ENCOUNTER — HOSPITAL ENCOUNTER (EMERGENCY)
Facility: HOSPITAL | Age: 73
Discharge: HOME | End: 2018-10-03
Attending: EMERGENCY MEDICINE
Payer: COMMERCIAL

## 2018-10-03 VITALS
TEMPERATURE: 98.7 F | SYSTOLIC BLOOD PRESSURE: 176 MMHG | DIASTOLIC BLOOD PRESSURE: 83 MMHG | WEIGHT: 211.64 LBS | BODY MASS INDEX: 31.25 KG/M2 | HEART RATE: 64 BPM | RESPIRATION RATE: 18 BRPM | OXYGEN SATURATION: 96 %

## 2018-10-03 DIAGNOSIS — L98.9 FINGER LESION: Primary | ICD-10-CM

## 2018-10-03 PROCEDURE — 99283 EMERGENCY DEPT VISIT LOW MDM: CPT

## 2018-10-03 PROCEDURE — 73140 X-RAY EXAM OF FINGER(S): CPT

## 2018-10-03 NOTE — DISCHARGE INSTRUCTIONS
Diagnosis; right 3rd finger skin lesion       - activity as tolerated     -  Apply over the counter topical / generic antibiotic ointment over area lightly with head of q-tip once a day for next week - cover with band aid during the day -- apply warm soaks to area for 15-30 minutes a day once a day for next week     - please return to  There er for any spreading redness /swellign/pain of area- any redness spreading up into palm     - as of now there is nothing to drain  In area

## 2018-10-05 NOTE — ED PROVIDER NOTES
History  Chief Complaint   Patient presents with    Finger Swelling     started with right middle finger swelling " a couple of weeks ago "  pt believes he has a foreign body in finger  pt states he may have poked himself with wire, tetanus not UTD     73 YR MALE WITH SEVERAL WEEKS OF SMALL AREA OF SWELLING R MIDDLE FINGER- HAS BEEN TRYING TO SQUEEZE TO GET ANY PUS OUT- THINGS MIGHT HAVE POKED HIMSELF WITH WIRE- POSS FB -- NO FEVER-SYSTEMIC COMPS        History provided by:  Patient   used: No        Prior to Admission Medications   Prescriptions Last Dose Informant Patient Reported? Taking? Coenzyme Q10 75 MG CAPS  Self Yes No   Sig: Take by mouth 2 (two) times a week   Multiple Vitamins-Minerals (ONE-A-DAY 50 PLUS PO) 10/3/2018 at Unknown time Self Yes Yes   Sig: Take 1 tablet by mouth daily   PARoxetine (PAXIL) 10 mg tablet 10/3/2018 at Unknown time  No Yes   Sig: TAKE 1 TABLET DAILY AS DIRECTED  aspirin (ECOTRIN LOW STRENGTH) 81 mg EC tablet 10/3/2018 at Unknown time Self Yes Yes   Sig: Take 81 mg by mouth daily   atenolol (TENORMIN) 25 mg tablet 10/2/2018 at Unknown time Self No Yes   Sig: Take 1 tablet (25 mg total) by mouth daily   ezetimibe-simvastatin (VYTORIN) 10-20 mg per tablet 10/2/2018 at Unknown time Self No Yes   Sig: TAKE 1 TABLET DAILY     ibuprofen (MOTRIN) 800 mg tablet  Self Yes No   Sig: TAKE 1 TABLET BY MOUTH EVERY 8 HOURS WITH FOOD AS NEEDED   omega-3-acid ethyl esters (LOVAZA) 1 g capsule 10/3/2018 at Unknown time  No Yes   Sig: Take 2 capsules (2 g total) by mouth 2 (two) times a day   pantoprazole (PROTONIX) 40 mg tablet 10/3/2018 at Unknown time Self No Yes   Sig: TAKE 1 TABLET BY MOUTH EVERY DAY   valsartan-hydrochlorothiazide (DIOVAN-HCT) 160-12 5 MG per tablet 10/3/2018 at Unknown time Self No Yes   Sig: TAKE 1 TABLET EVERY DAY      Facility-Administered Medications: None       Past Medical History:   Diagnosis Date    Abnormal electrocardiography resolved: 11/21/2017    Abnormal laboratory test     last assessed: 11/16/2016    Anxiety     Community acquired pneumonia     last assessed: 10/15/2015    Complete tear of left rotator cuff     unspecified laterality    Coronary artery disease     Depression     GERD (gastroesophageal reflux disease)     Hyperlipidemia     Hypertension     Inguinal mass     last assessed: 1/21/2015    Myocardial infarction ([facility] [address] )     Nontraumatic rupture of tendons of biceps (long head), left     last assessed: 1/2/2017    Right trigger finger     last assessed: 1/13/2015    Skin lesion     last assessed: 4/21/2016    Skin lesion of cheek     last assessed: 2/25/2016       Past Surgical History:   Procedure Laterality Date    CAROTID STENT      transcath placement of carotid artery stent    COLONOSCOPY      CORONARY ANGIOPLASTY      OR COLONOSCOPY FLX DX W/COLLJ SPEC WHEN PFRMD N/A 11/3/2017    Procedure: COLONOSCOPY;  Surgeon: Dara Cavazos MD;  [facility]; Service: Colorectal    ROTATOR CUFF REPAIR Bilateral     SHOULDER SURGERY         Family History   Problem Relation Age of Onset    Other Mother         brain tumor    Coronary artery disease Mother     No Known Problems Father      I have reviewed and agree with the history as documented  Social History   Substance Use Topics    Smoking status: Former Smoker     Quit date: 1978    Smokeless tobacco: Never Used    Alcohol use 1 2 oz/week     2 Shots of liquor per week      Comment: 2 martinis/ day;  social alcohol use (as per allscripts)        Review of Systems   Constitutional: Negative  HENT: Negative  Eyes: Negative  Respiratory: Negative  Cardiovascular: Negative  Gastrointestinal: Negative  Endocrine: Negative  Genitourinary: Negative  Musculoskeletal: Negative  Skin: Positive for wound  Negative for color change, pallor and rash  Allergic/Immunologic: Negative  Neurological: Negative  Hematological: Negative  Psychiatric/Behavioral: Negative  Physical Exam  Physical Exam   Constitutional: He appears well-developed and well-nourished  No distress  AVSS-- PULSE OX  96 % ON RA- INTERPRETATION IS NORMAL- NO INTERVENTION - VERY WELL APPEARING- IN NAD   Musculoskeletal:   R MIDDLE FINGER-- VERY SMALL FIRM NT AREA THUMB SIDE AT PIP-- NO FLUCTUANCE- NO ASCENDING/ SURROUNDING ERYTHEMA-- NORMAL DIGIT/FLEX/EXT/SENSATION/CAP REFILL-- NO DIGIT EDEMA   Skin: He is not diaphoretic  Nursing note and vitals reviewed  Vital Signs  ED Triage Vitals [10/03/18 1209]   Temperature Pulse Respirations Blood Pressure SpO2   98 7 °F (37 1 °C) 64 18 (!) 176/83 96 %      Temp Source Heart Rate Source Patient Position - Orthostatic VS BP Location FiO2 (%)   Oral Monitor -- -- --      Pain Score       --           Vitals:    10/03/18 1209   BP: (!) 176/83   Pulse: 64       Visual Acuity      ED Medications  Medications - No data to display    Diagnostic Studies  Results Reviewed     None                 XR finger right third digit-middle   Final Result by Zeina Pulido MD (10/03 1227)      No acute osseous abnormality  No radiopaque foreign body  Workstation performed: [id number]                    Procedures  Procedures       Phone Contacts  ED Phone Contact    ED Course  ED Course as of Oct 05 0111   Wed Oct 03, 2018   1241 R 3rd finger xray - no fb seen                                 MDM  CritCare Time    Disposition  Final diagnoses:   Finger lesion     Time reflects when diagnosis was documented in both MDM as applicable and the Disposition within this note     Time User Action Codes Description Comment    10/3/2018 12:42 PM Corinna Hallman Add [L98 9] Finger lesion       ED Disposition     ED Disposition Condition Comment    Discharge  Em Dlshikha discharge to home/self care      Condition at discharge: Good        Follow-up Information    None         Discharge Medication List as of 10/3/2018 12:44 PM      CONTINUE these medications which have NOT CHANGED    Details   aspirin (ECOTRIN LOW STRENGTH) 81 mg EC tablet Take 81 mg by mouth daily, Historical Med      atenolol (TENORMIN) 25 mg tablet Take 1 tablet (25 mg total) by mouth daily, Starting Sun 8/19/2018, Normal      ezetimibe-simvastatin (VYTORIN) 10-20 mg per tablet TAKE 1 TABLET DAILY , Normal      Multiple Vitamins-Minerals (ONE-A-DAY 50 PLUS PO) Take 1 tablet by mouth daily, Historical Med      omega-3-acid ethyl esters (LOVAZA) 1 g capsule Take 2 capsules (2 g total) by mouth 2 (two) times a day, Starting Mon 9/24/2018, Normal      pantoprazole (PROTONIX) 40 mg tablet TAKE 1 TABLET BY MOUTH EVERY DAY, Normal      PARoxetine (PAXIL) 10 mg tablet TAKE 1 TABLET DAILY AS DIRECTED , Normal      valsartan-hydrochlorothiazide (DIOVAN-HCT) 160-12 5 MG per tablet TAKE 1 TABLET EVERY DAY, Normal      Coenzyme Q10 75 MG CAPS Take by mouth 2 (two) times a week, Historical Med      ibuprofen (MOTRIN) 800 mg tablet TAKE 1 TABLET BY MOUTH EVERY 8 HOURS WITH FOOD AS NEEDED, Historical Med           No discharge procedures on file      ED Provider  Electronically Signed by           Ramya Jones MD  10/05/18 3005

## 2018-10-19 ENCOUNTER — TELEPHONE (OUTPATIENT)
Dept: FAMILY MEDICINE CLINIC | Facility: CLINIC | Age: 73
End: 2018-10-19

## 2018-10-19 NOTE — TELEPHONE ENCOUNTER
Patient rescheduled apt with you  He is requesting for his  blood work to be reviewed from 8/24/18 and to let him know if he needs to have any concerns  Patient would like a results letter sent to address  Patient also would like to know if you want him to have further blood work done before his January apt with you for a 6 month f/u  Please advise  Patient would like a return call

## 2018-10-20 DIAGNOSIS — E78.2 MIXED HYPERLIPIDEMIA: ICD-10-CM

## 2018-10-20 DIAGNOSIS — I10 ESSENTIAL HYPERTENSION: Primary | ICD-10-CM

## 2018-10-20 DIAGNOSIS — R73.01 IMPAIRED FASTING GLUCOSE: ICD-10-CM

## 2018-10-20 NOTE — TELEPHONE ENCOUNTER
Call patient labs 08/2018 reviewed   normal less than 100  Watch diet  Cholesterol 119 less than 200 normal   Triglycerides 214 less than 150 normal   2 years ago  Continue with current medications    I did print out a lab slip to have blood work done prior to his next visit in January

## 2018-11-27 ENCOUNTER — OFFICE VISIT (OUTPATIENT)
Dept: FAMILY MEDICINE CLINIC | Facility: CLINIC | Age: 73
End: 2018-11-27
Payer: COMMERCIAL

## 2018-11-27 VITALS
TEMPERATURE: 97.8 F | HEART RATE: 64 BPM | RESPIRATION RATE: 16 BRPM | BODY MASS INDEX: 31.6 KG/M2 | DIASTOLIC BLOOD PRESSURE: 76 MMHG | WEIGHT: 214 LBS | SYSTOLIC BLOOD PRESSURE: 142 MMHG

## 2018-11-27 DIAGNOSIS — M65.4 DE QUERVAIN'S DISEASE (TENOSYNOVITIS): ICD-10-CM

## 2018-11-27 DIAGNOSIS — I10 ESSENTIAL HYPERTENSION: ICD-10-CM

## 2018-11-27 DIAGNOSIS — B07.9 VIRAL WARTS, UNSPECIFIED TYPE: Primary | ICD-10-CM

## 2018-11-27 PROCEDURE — 17110 DESTRUCTION B9 LES UP TO 14: CPT | Performed by: FAMILY MEDICINE

## 2018-11-27 PROCEDURE — 1160F RVW MEDS BY RX/DR IN RCRD: CPT | Performed by: FAMILY MEDICINE

## 2018-11-27 PROCEDURE — 99212 OFFICE O/P EST SF 10 MIN: CPT | Performed by: FAMILY MEDICINE

## 2018-11-27 PROCEDURE — 1036F TOBACCO NON-USER: CPT | Performed by: FAMILY MEDICINE

## 2018-11-27 RX ORDER — VALSARTAN AND HYDROCHLOROTHIAZIDE 160; 12.5 MG/1; MG/1
TABLET, FILM COATED ORAL
Qty: 90 TABLET | Refills: 1 | Status: SHIPPED | OUTPATIENT
Start: 2018-11-27 | End: 2020-01-21

## 2018-11-27 NOTE — PROGRESS NOTES
Assessment/Plan:       Chanel Monroy was seen today for skin lesion  Diagnoses and all orders for this visit:    Viral warts, unspecified type  -     Lesion Destruction    De Quervain's disease (tenosynovitis)         Continue with ibuprofen for tendonitis  Ice heat  Consider PT and/or steroid injection for persistent symptoms  See procedure note for destruction of lesion forehead  Patient ID: Ashley Melendez is a 68 y o  male  3-4  week history of pain and swelling left wrist area  No trauma or injury  Right handed  He had carpal tunnel surgery right wrist 2 weeks ago and is currently in a cast   Symptoms left wrist started prior to surgery  He has been using Ibuprofen 800 mg as needed  current medications reviewed  Review of Systems   Musculoskeletal:        See HPI   Skin: Positive for rash  Irritation at skin lesion forehead  Objective:    /76   Pulse 64   Temp 97 8 °F (36 6 °C)   Resp 16   Wt 97 1 kg (214 lb)   BMI 31 60 kg/m²      Physical Exam   Constitutional: No distress  Musculoskeletal: He exhibits deformity (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  Dupuytren's contracture base of left 4th finger)  Left wrist: He exhibits tenderness and swelling  He exhibits normal range of motion, no bony tenderness, no effusion, no crepitus and no deformity  Tenderness over the abductor tendon of left thumb with a positive Finkelstein's test   Mild swelling  No warmth or redness  Skin:   Verrucous lesion forehead mid line  Lesion Destruction  Date/Time: 11/27/2018 9:45 AM  Performed by: Tisha Campbell  Authorized by: Tisha Campbell     Procedure Details - Lesion Destruction:     Number of Lesions:  1  Lesion 1:     Body area:  Head/neck    Head/neck location:  Scalp    Malignancy: benign lesion      Destruction method: cryotherapy    Lesion 6:      Verrucous lesion forehead in mid line treated with cryosurgery  3 freeze/thaw cycles   Wound care instructions reviewed

## 2018-12-13 DIAGNOSIS — E78.5 HYPERLIPIDEMIA, UNSPECIFIED HYPERLIPIDEMIA TYPE: ICD-10-CM

## 2018-12-13 RX ORDER — OMEGA-3-ACID ETHYL ESTERS 1 G/1
2 CAPSULE, LIQUID FILLED ORAL 2 TIMES DAILY
Qty: 360 CAPSULE | Refills: 3 | Status: SHIPPED | OUTPATIENT
Start: 2018-12-13 | End: 2019-12-09 | Stop reason: SDUPTHER

## 2019-01-03 ENCOUNTER — OFFICE VISIT (OUTPATIENT)
Dept: FAMILY MEDICINE CLINIC | Facility: CLINIC | Age: 74
End: 2019-01-03
Payer: COMMERCIAL

## 2019-01-03 VITALS
WEIGHT: 210 LBS | BODY MASS INDEX: 33.75 KG/M2 | SYSTOLIC BLOOD PRESSURE: 118 MMHG | HEIGHT: 66 IN | RESPIRATION RATE: 16 BRPM | DIASTOLIC BLOOD PRESSURE: 62 MMHG | TEMPERATURE: 97.3 F | HEART RATE: 62 BPM

## 2019-01-03 DIAGNOSIS — D69.6 THROMBOCYTOPENIA (HCC): ICD-10-CM

## 2019-01-03 DIAGNOSIS — I10 ESSENTIAL HYPERTENSION: Primary | ICD-10-CM

## 2019-01-03 DIAGNOSIS — E78.2 MIXED HYPERLIPIDEMIA: ICD-10-CM

## 2019-01-03 DIAGNOSIS — R73.01 IMPAIRED FASTING GLUCOSE: ICD-10-CM

## 2019-01-03 DIAGNOSIS — F41.1 GAD (GENERALIZED ANXIETY DISORDER): ICD-10-CM

## 2019-01-03 DIAGNOSIS — I25.10 ATHEROSCLEROSIS OF NATIVE CORONARY ARTERY OF NATIVE HEART WITHOUT ANGINA PECTORIS: ICD-10-CM

## 2019-01-03 DIAGNOSIS — E66.9 OBESITY (BMI 30-39.9): ICD-10-CM

## 2019-01-03 PROBLEM — M79.89 SWELLING OF RIGHT MIDDLE FINGER: Status: RESOLVED | Noted: 2018-09-06 | Resolved: 2019-01-03

## 2019-01-03 PROCEDURE — 3008F BODY MASS INDEX DOCD: CPT | Performed by: FAMILY MEDICINE

## 2019-01-03 PROCEDURE — 1036F TOBACCO NON-USER: CPT | Performed by: FAMILY MEDICINE

## 2019-01-03 PROCEDURE — 1160F RVW MEDS BY RX/DR IN RCRD: CPT | Performed by: FAMILY MEDICINE

## 2019-01-03 PROCEDURE — 99214 OFFICE O/P EST MOD 30 MIN: CPT | Performed by: FAMILY MEDICINE

## 2019-01-03 PROCEDURE — 3078F DIAST BP <80 MM HG: CPT | Performed by: FAMILY MEDICINE

## 2019-01-03 PROCEDURE — 1101F PT FALLS ASSESS-DOCD LE1/YR: CPT | Performed by: FAMILY MEDICINE

## 2019-01-03 PROCEDURE — 3074F SYST BP LT 130 MM HG: CPT | Performed by: FAMILY MEDICINE

## 2019-01-03 NOTE — PROGRESS NOTES
Assessment/Plan:         Diagnoses and all orders for this visit:    Essential hypertension  -     CBC and differential  -     Comprehensive metabolic panel    Mixed hyperlipidemia  -     Lipid panel  -     TSH, 3rd generation with Free T4 reflex    Impaired fasting glucose  -     Hemoglobin A1C    Obesity (BMI 30-39  9)    Atherosclerosis of native coronary artery of native heart without angina pectoris    Thrombocytopenia (HCC)    ARIELLE (generalized anxiety disorder)      Continue with current medications  Repeat labs 02/2019  OV 6 months  BMI Counseling: Body mass index is 33 49 kg/m²  Discussed the patient's BMI with him  The BMI is above average  BMI counseling and education was provided to the patient  Nutrition recommendations include reducing portion sizes, decreasing overall calorie intake, consuming healthier snacks, moderation in carbohydrate intake, reducing intake of saturated fat and trans fat and reducing intake of cholesterol  Exercise recommendations include exercising 3-5 times per week  Patient ID: Damon Jensen is a 68 y o  male  Follow up visit  Medications reviewed  Labs 08/2018 see note  Hypertension blood pressures have been stable on current regimen Atenolol 25 mg daily and Valsartan HCTZ 160/12 5 daily  Creatinine 0 84  Electrolytes normal  Hyperlipidemia mixed type and CAD on Vytorin 10/20 daily and generic Lovaza 1 gm 2 capsules BID  Cholesterol 119  Triglycerides 214  HDL 46  LDL 30  LFTs normal  Impaired fasting glucose   The following portions of the patient's history were reviewed and updated as appropriate: current medications, past family history, past medical history, past social history, past surgical history and problem list     Allergies   Allergen Reactions    Atorvastatin      Review of Systems   Constitutional: Negative for appetite change, chills, fever and unexpected weight change          4 lb weight loss from 11/2018   HENT: Negative for congestion, ear pain, rhinorrhea, sore throat and trouble swallowing  Eyes: Negative for visual disturbance  Respiratory: Negative for cough, shortness of breath and wheezing  Cardiovascular: Negative for chest pain, palpitations and leg swelling  See HPI  CAD s/p inferior MI  2 stents RCA   04/2015 echocardiogram normal left ventricular systolic function  EF 60%  No valvular disease  01/2015 nuclear stress test no perfusion defects  Ejection fraction 65%  No left ventricular regional abnormality  Gastrointestinal: Negative for abdominal pain, blood in stool, constipation, diarrhea, nausea and vomiting  GERD stable on Protonix 40 mg daily  No reflux  no dysphagia  Colonoscopy 11/2017   Genitourinary: Negative for difficulty urinating  04/2018 PSA 0 6   Musculoskeletal: Negative for arthralgias and myalgias  Skin: Negative for rash  Allergic/Immunologic: Negative for environmental allergies  Neurological: Negative for dizziness, weakness and headaches  Hematological: Negative for adenopathy  Does not bruise/bleed easily  History of mild thrombocytopenia   08/2018  CBC WBC 6250  Hemoglobin 14 2  MCV 99  Platelet count 131,659  Psychiatric/Behavioral: Positive for dysphoric mood  Negative for sleep disturbance  Stable on Paxil 10 mg daily          Objective:      /62   Pulse 62   Temp (!) 97 3 °F (36 3 °C)   Resp 16   Ht 5' 6 4" (1 687 m)   Wt 95 3 kg (210 lb)   BMI 33 49 kg/m²          Physical Exam   Constitutional: He is oriented to person, place, and time  He appears well-developed and well-nourished  No distress  HENT:   Right Ear: Tympanic membrane normal    Left Ear: Tympanic membrane normal    Mouth/Throat: Oropharynx is clear and moist    Eyes: Pupils are equal, round, and reactive to light  Conjunctivae and EOM are normal  No scleral icterus  Neck: No JVD present  Carotid bruit is not present  No tracheal deviation present   No thyroid mass and no thyromegaly present  Cardiovascular: Normal rate, regular rhythm and normal heart sounds  Exam reveals no gallop  No murmur heard  Pulses:       Carotid pulses are 2+ on the right side, and 2+ on the left side  No abdominal bruits   Pulmonary/Chest: Effort normal and breath sounds normal  No respiratory distress  He has no wheezes  He has no rales  Abdominal: Soft  Bowel sounds are normal  He exhibits no distension and no mass  There is no hepatosplenomegaly  There is no tenderness  There is no rebound and no guarding  Musculoskeletal: He exhibits no edema  Lymphadenopathy:     He has no cervical adenopathy  Neurological: He is alert and oriented to person, place, and time  No cranial nerve deficit  Skin: No rash noted  Psychiatric: He has a normal mood and affect  Nursing note and vitals reviewed          Recent Results (from the past 4032 hour(s))   CBC and differential    Collection Time: 08/24/18  7:11 AM   Result Value Ref Range    WBC 6 25 4 31 - 10 16 Thousand/uL    RBC 4 39 3 88 - 5 62 Million/uL    Hemoglobin 14 2 12 0 - 17 0 g/dL    Hematocrit 43 3 36 5 - 49 3 %    MCV 99 (H) 82 - 98 fL    MCH 32 3 26 8 - 34 3 pg    MCHC 32 8 31 4 - 37 4 g/dL    RDW 13 1 11 6 - 15 1 %    MPV 9 9 8 9 - 12 7 fL    Platelets 718 (L) 776 - 390 Thousands/uL    nRBC 0 /100 WBCs    Neutrophils Relative 42 (L) 43 - 75 %    Immat GRANS % 0 0 - 2 %    Lymphocytes Relative 32 14 - 44 %    Monocytes Relative 9 4 - 12 %    Eosinophils Relative 16 (H) 0 - 6 %    Basophils Relative 1 0 - 1 %    Neutrophils Absolute 2 63 1 85 - 7 62 Thousands/µL    Immature Grans Absolute 0 02 0 00 - 0 20 Thousand/uL    Lymphocytes Absolute 1 97 0 60 - 4 47 Thousands/µL    Monocytes Absolute 0 57 0 17 - 1 22 Thousand/µL    Eosinophils Absolute 0 99 (H) 0 00 - 0 61 Thousand/µL    Basophils Absolute 0 07 0 00 - 0 10 Thousands/µL   Comprehensive metabolic panel    Collection Time: 08/24/18  7:11 AM   Result Value Ref Range    Sodium 138 136 - 145 mmol/L    Potassium 3 9 3 5 - 5 3 mmol/L    Chloride 103 100 - 108 mmol/L    CO2 30 21 - 32 mmol/L    ANION GAP 5 4 - 13 mmol/L    BUN 14 5 - 25 mg/dL    Creatinine 0 84 0 60 - 1 30 mg/dL    Glucose, Fasting 113 (H) 65 - 99 mg/dL    Calcium 8 6 8 3 - 10 1 mg/dL    AST 33 5 - 45 U/L    ALT 37 12 - 78 U/L    Alkaline Phosphatase 91 46 - 116 U/L    Total Protein 7 4 6 4 - 8 2 g/dL    Albumin 3 5 3 5 - 5 0 g/dL    Total Bilirubin 0 67 0 20 - 1 00 mg/dL    eGFR 87 ml/min/1 73sq m   Lipid panel    Collection Time: 08/24/18  7:11 AM   Result Value Ref Range    Cholesterol 119 50 - 200 mg/dL    Triglycerides 214 (H) <=150 mg/dL    HDL, Direct 46 40 - 60 mg/dL    LDL Calculated 30 0 - 100 mg/dL    Non-HDL-Chol (CHOL-HDL) 73 mg/dl

## 2019-01-30 ENCOUNTER — LAB (OUTPATIENT)
Dept: LAB | Facility: CLINIC | Age: 74
End: 2019-01-30
Payer: COMMERCIAL

## 2019-01-30 LAB
ALBUMIN SERPL BCP-MCNC: 3.5 G/DL (ref 3.5–5)
ALP SERPL-CCNC: 90 U/L (ref 46–116)
ALT SERPL W P-5'-P-CCNC: 36 U/L (ref 12–78)
ANION GAP SERPL CALCULATED.3IONS-SCNC: 7 MMOL/L (ref 4–13)
AST SERPL W P-5'-P-CCNC: 31 U/L (ref 5–45)
BASOPHILS # BLD AUTO: 0.06 THOUSANDS/ΜL (ref 0–0.1)
BASOPHILS NFR BLD AUTO: 1 % (ref 0–1)
BILIRUB SERPL-MCNC: 0.74 MG/DL (ref 0.2–1)
BUN SERPL-MCNC: 12 MG/DL (ref 5–25)
CALCIUM SERPL-MCNC: 8.5 MG/DL (ref 8.3–10.1)
CHLORIDE SERPL-SCNC: 106 MMOL/L (ref 100–108)
CHOLEST SERPL-MCNC: 120 MG/DL (ref 50–200)
CO2 SERPL-SCNC: 27 MMOL/L (ref 21–32)
CREAT SERPL-MCNC: 0.88 MG/DL (ref 0.6–1.3)
EOSINOPHIL # BLD AUTO: 0.79 THOUSAND/ΜL (ref 0–0.61)
EOSINOPHIL NFR BLD AUTO: 12 % (ref 0–6)
ERYTHROCYTE [DISTWIDTH] IN BLOOD BY AUTOMATED COUNT: 11.9 % (ref 11.6–15.1)
EST. AVERAGE GLUCOSE BLD GHB EST-MCNC: 111 MG/DL
GFR SERPL CREATININE-BSD FRML MDRD: 85 ML/MIN/1.73SQ M
GLUCOSE P FAST SERPL-MCNC: 114 MG/DL (ref 65–99)
HBA1C MFR BLD: 5.5 % (ref 4.2–6.3)
HCT VFR BLD AUTO: 41.8 % (ref 36.5–49.3)
HDLC SERPL-MCNC: 54 MG/DL (ref 40–60)
HGB BLD-MCNC: 14.2 G/DL (ref 12–17)
IMM GRANULOCYTES # BLD AUTO: 0.02 THOUSAND/UL (ref 0–0.2)
IMM GRANULOCYTES NFR BLD AUTO: 0 % (ref 0–2)
LDLC SERPL CALC-MCNC: 46 MG/DL (ref 0–100)
LYMPHOCYTES # BLD AUTO: 1.85 THOUSANDS/ΜL (ref 0.6–4.47)
LYMPHOCYTES NFR BLD AUTO: 28 % (ref 14–44)
MCH RBC QN AUTO: 33.7 PG (ref 26.8–34.3)
MCHC RBC AUTO-ENTMCNC: 34 G/DL (ref 31.4–37.4)
MCV RBC AUTO: 99 FL (ref 82–98)
MONOCYTES # BLD AUTO: 0.51 THOUSAND/ΜL (ref 0.17–1.22)
MONOCYTES NFR BLD AUTO: 8 % (ref 4–12)
NEUTROPHILS # BLD AUTO: 3.29 THOUSANDS/ΜL (ref 1.85–7.62)
NEUTS SEG NFR BLD AUTO: 51 % (ref 43–75)
NONHDLC SERPL-MCNC: 66 MG/DL
NRBC BLD AUTO-RTO: 0 /100 WBCS
PLATELET # BLD AUTO: 159 THOUSANDS/UL (ref 149–390)
PMV BLD AUTO: 9.5 FL (ref 8.9–12.7)
POTASSIUM SERPL-SCNC: 3.9 MMOL/L (ref 3.5–5.3)
PROT SERPL-MCNC: 6.9 G/DL (ref 6.4–8.2)
RBC # BLD AUTO: 4.21 MILLION/UL (ref 3.88–5.62)
SODIUM SERPL-SCNC: 140 MMOL/L (ref 136–145)
TRIGL SERPL-MCNC: 100 MG/DL
TSH SERPL DL<=0.05 MIU/L-ACNC: 3.56 UIU/ML (ref 0.36–3.74)
WBC # BLD AUTO: 6.52 THOUSAND/UL (ref 4.31–10.16)

## 2019-01-30 PROCEDURE — 80061 LIPID PANEL: CPT | Performed by: FAMILY MEDICINE

## 2019-01-30 PROCEDURE — 85025 COMPLETE CBC W/AUTO DIFF WBC: CPT | Performed by: FAMILY MEDICINE

## 2019-01-30 PROCEDURE — 80053 COMPREHEN METABOLIC PANEL: CPT | Performed by: FAMILY MEDICINE

## 2019-01-30 PROCEDURE — 83036 HEMOGLOBIN GLYCOSYLATED A1C: CPT | Performed by: FAMILY MEDICINE

## 2019-01-30 PROCEDURE — 36415 COLL VENOUS BLD VENIPUNCTURE: CPT | Performed by: FAMILY MEDICINE

## 2019-01-30 PROCEDURE — 84443 ASSAY THYROID STIM HORMONE: CPT | Performed by: FAMILY MEDICINE

## 2019-02-01 NOTE — PROGRESS NOTES
[facility], All your labs are normal except for a fasting blood sugar at 114 normal < 100  Borderline abnormal 100 to 125  Watch diet  I would repeat labs in 6 months

## 2019-03-23 DIAGNOSIS — F41.1 GAD (GENERALIZED ANXIETY DISORDER): ICD-10-CM

## 2019-03-23 RX ORDER — PAROXETINE 10 MG/1
TABLET, FILM COATED ORAL
Qty: 90 TABLET | Refills: 1 | Status: SHIPPED | OUTPATIENT
Start: 2019-03-23 | End: 2019-09-13 | Stop reason: SDUPTHER

## 2019-04-15 DIAGNOSIS — E78.00 PURE HYPERCHOLESTEROLEMIA: ICD-10-CM

## 2019-04-15 DIAGNOSIS — K21.9 CHRONIC GERD: ICD-10-CM

## 2019-04-15 RX ORDER — EZETIMIBE AND SIMVASTATIN 10; 20 MG/1; MG/1
1 TABLET ORAL DAILY
Qty: 90 TABLET | Refills: 2 | Status: SHIPPED | OUTPATIENT
Start: 2019-04-15 | End: 2020-01-10

## 2019-04-15 RX ORDER — PANTOPRAZOLE SODIUM 40 MG/1
40 TABLET, DELAYED RELEASE ORAL DAILY
Qty: 90 TABLET | Refills: 1 | Status: SHIPPED | OUTPATIENT
Start: 2019-04-15 | End: 2019-10-20 | Stop reason: SDUPTHER

## 2019-05-10 ENCOUNTER — OFFICE VISIT (OUTPATIENT)
Dept: URGENT CARE | Age: 74
End: 2019-05-10
Payer: COMMERCIAL

## 2019-05-10 VITALS
BODY MASS INDEX: 29.62 KG/M2 | HEIGHT: 69 IN | WEIGHT: 200 LBS | SYSTOLIC BLOOD PRESSURE: 139 MMHG | RESPIRATION RATE: 18 BRPM | TEMPERATURE: 97.9 F | HEART RATE: 60 BPM | DIASTOLIC BLOOD PRESSURE: 71 MMHG | OXYGEN SATURATION: 95 %

## 2019-05-10 DIAGNOSIS — S09.93XA INJURY OF FACE, INITIAL ENCOUNTER: Primary | ICD-10-CM

## 2019-05-10 PROCEDURE — 99212 OFFICE O/P EST SF 10 MIN: CPT | Performed by: FAMILY MEDICINE

## 2019-06-12 ENCOUNTER — TELEPHONE (OUTPATIENT)
Dept: FAMILY MEDICINE CLINIC | Facility: CLINIC | Age: 74
End: 2019-06-12

## 2019-08-12 ENCOUNTER — CLINICAL SUPPORT (OUTPATIENT)
Dept: FAMILY MEDICINE CLINIC | Facility: CLINIC | Age: 74
End: 2019-08-12
Payer: COMMERCIAL

## 2019-08-12 ENCOUNTER — TELEPHONE (OUTPATIENT)
Dept: FAMILY MEDICINE CLINIC | Facility: CLINIC | Age: 74
End: 2019-08-12

## 2019-08-12 DIAGNOSIS — Z23 NEED FOR SHINGLES VACCINE: Primary | ICD-10-CM

## 2019-08-12 PROCEDURE — 90471 IMMUNIZATION ADMIN: CPT

## 2019-08-12 PROCEDURE — 90750 HZV VACC RECOMBINANT IM: CPT

## 2019-08-28 DIAGNOSIS — I25.10 ATHEROSCLEROSIS OF NATIVE CORONARY ARTERY OF NATIVE HEART WITHOUT ANGINA PECTORIS: Primary | ICD-10-CM

## 2019-08-28 DIAGNOSIS — I10 ESSENTIAL HYPERTENSION: ICD-10-CM

## 2019-09-06 ENCOUNTER — LAB (OUTPATIENT)
Dept: LAB | Facility: CLINIC | Age: 74
End: 2019-09-06
Payer: COMMERCIAL

## 2019-09-06 DIAGNOSIS — I25.10 ATHEROSCLEROSIS OF NATIVE CORONARY ARTERY OF NATIVE HEART WITHOUT ANGINA PECTORIS: ICD-10-CM

## 2019-09-06 DIAGNOSIS — I10 ESSENTIAL HYPERTENSION: ICD-10-CM

## 2019-09-06 LAB
ALBUMIN SERPL BCP-MCNC: 3.4 G/DL (ref 3.5–5)
ALP SERPL-CCNC: 88 U/L (ref 46–116)
ALT SERPL W P-5'-P-CCNC: 29 U/L (ref 12–78)
ANION GAP SERPL CALCULATED.3IONS-SCNC: 5 MMOL/L (ref 4–13)
AST SERPL W P-5'-P-CCNC: 25 U/L (ref 5–45)
BILIRUB SERPL-MCNC: 0.77 MG/DL (ref 0.2–1)
BUN SERPL-MCNC: 14 MG/DL (ref 5–25)
CALCIUM SERPL-MCNC: 8.7 MG/DL (ref 8.3–10.1)
CHLORIDE SERPL-SCNC: 105 MMOL/L (ref 100–108)
CHOLEST SERPL-MCNC: 119 MG/DL (ref 50–200)
CO2 SERPL-SCNC: 28 MMOL/L (ref 21–32)
CREAT SERPL-MCNC: 0.81 MG/DL (ref 0.6–1.3)
GFR SERPL CREATININE-BSD FRML MDRD: 88 ML/MIN/1.73SQ M
GLUCOSE P FAST SERPL-MCNC: 109 MG/DL (ref 65–99)
HDLC SERPL-MCNC: 49 MG/DL (ref 40–60)
LDLC SERPL CALC-MCNC: 46 MG/DL (ref 0–100)
POTASSIUM SERPL-SCNC: 4 MMOL/L (ref 3.5–5.3)
PROT SERPL-MCNC: 6.8 G/DL (ref 6.4–8.2)
SODIUM SERPL-SCNC: 138 MMOL/L (ref 136–145)
TRIGL SERPL-MCNC: 121 MG/DL

## 2019-09-06 PROCEDURE — 36415 COLL VENOUS BLD VENIPUNCTURE: CPT

## 2019-09-06 PROCEDURE — 80053 COMPREHEN METABOLIC PANEL: CPT

## 2019-09-06 PROCEDURE — 80061 LIPID PANEL: CPT

## 2019-09-13 DIAGNOSIS — F41.1 GAD (GENERALIZED ANXIETY DISORDER): ICD-10-CM

## 2019-09-13 RX ORDER — PAROXETINE 10 MG/1
TABLET, FILM COATED ORAL
Qty: 90 TABLET | Refills: 1 | Status: SHIPPED | OUTPATIENT
Start: 2019-09-13 | End: 2020-10-29

## 2019-10-06 NOTE — PROGRESS NOTES
Cardiology Follow Up    Lamin Johnson  1945  [id number]  [address]  [facility] [address] [facility]   [phone]    1  Preoperative cardiovascular examination  POCT ECG   2  Essential hypertension     3  Pure hypercholesterolemia     4  Coronary arteriosclerosis         Interval History:  Patient is here for a follow-up visit  He was most recently seen by me in August 2018  He has a prior history of PCI in the setting of myocardial infarction  His most recent echocardiogram done April 2015 demonstrated preserved LV systolic function with mild LVH and no significant valvular heart disease  A pharmacologic nuclear stress test done January of 2015 demonstrated no evidence of prognostically important ischemia  Patient had a lipid profile done September 6, 2019 which demonstrated total cholesterol of 119 with an HDL of 49 and a direct LDL of 46  Patient has had no chest pain or significant dyspnea  His vital signs are stable today  He is scheduled to have dental implants performed  He is okay from my point of view to proceed with this  EKG demonstrates sinus rhythm with minor nonspecific ST segment changes with no significant change compared to a prior tracing done September 6, 2018  He is scheduled to have his procedure done by Dr Torin Fang who is an oral surgeon      Patient Active Problem List   Diagnosis    CAD (coronary atherosclerotic disease)    Hyperlipidemia    Hypertension    Bilateral carotid artery disease ([address] )    ARIELLE (generalized anxiety disorder)    Impaired fasting glucose    Asthma    Benign colon polyp    Diverticulosis    Dupuytren's contracture    GERD without esophagitis    Thrombocytopenia ([address] )     Past Medical History:   Diagnosis Date    Abnormal electrocardiography     resolved: 11/21/2017    Abnormal laboratory test     last assessed: 11/16/2016    Acute myocardial infarction of inferior wall ([address] ) 2015    Anxiety     Community acquired pneumonia     last assessed: 10/15/2015    Complete tear of left rotator cuff     unspecified laterality    Coronary artery disease     Depression     GERD (gastroesophageal reflux disease)     Hyperlipidemia     Hypertension     Impingement syndrome of left shoulder 3/9/2015    Inguinal mass     last assessed: 2015    Myocardial infarction [facility])     Nontraumatic rupture of tendons of biceps (long head), left     last assessed: 2017    Right trigger finger     last assessed: 2015    Skin lesion     last assessed: 2016    Skin lesion of cheek     last assessed: 2016     Social History     Socioeconomic History    Marital status: /Civil Union     Spouse name: Not on file    Number of children: Not on file    Years of education: Not on file    Highest education level: Not on file   Occupational History    Not on file   Social Needs    Financial resource strain: Not on file    Food insecurity:     Worry: Not on file     Inability: Not on file    Transportation needs:     Medical: Not on file     Non-medical: Not on file   Tobacco Use    Smoking status: Former Smoker     Last attempt to quit: 1978     Years since quittin 8    Smokeless tobacco: Never Used   Substance and Sexual Activity    Alcohol use:  Yes    Drug use: No    Sexual activity: Not on file   Lifestyle    Physical activity:     Days per week: Not on file     Minutes per session: Not on file    Stress: Not on file   Relationships    Social connections:     Talks on phone: Not on file     Gets together: Not on file     Attends [employer] service: Not on file     Active member of club or organization: Not on file     Attends meetings of clubs or organizations: Not on file     Relationship status: Not on file    Intimate partner violence:     Fear of current or ex partner: Not on file     Emotionally abused: Not on file     Physically abused: Not on file     Forced sexual activity: Not on file   Other Topics Concern    Not on file   Social History Narrative    Not on file      Family History   Problem Relation Age of Onset    Other Mother         brain tumor    Coronary artery disease Mother     No Known Problems Father      Past Surgical History:   Procedure Laterality Date    CAROTID STENT      transcath placement of carotid artery stent    COLONOSCOPY      CORONARY ANGIOPLASTY      OR COLONOSCOPY FLX DX W/COLLJ SPEC WHEN PFRMD N/A 11/3/2017    Procedure: COLONOSCOPY;  Surgeon: Jim Najera MD;  Location: [facility]; Service: Colorectal    ROTATOR CUFF REPAIR Bilateral     SHOULDER SURGERY         Current Outpatient Medications:     aspirin (ECOTRIN LOW STRENGTH) 81 mg EC tablet, Take 81 mg by mouth daily, Disp: , Rfl:     atenolol (TENORMIN) 25 mg tablet, Take 1 tablet (25 mg total) by mouth daily, Disp: 90 tablet, Rfl: 0    Coenzyme Q10 75 MG CAPS, Take by mouth 2 (two) times a week, Disp: , Rfl:     ezetimibe-simvastatin (VYTORIN) 10-20 mg per tablet, Take 1 tablet by mouth daily, Disp: 90 tablet, Rfl: 2    Multiple Vitamins-Minerals (ONE-A-DAY 50 PLUS PO), Take 1 tablet by mouth daily, Disp: , Rfl:     omega-3-acid ethyl esters (LOVAZA) 1 g capsule, TAKE 2 CAPSULES (2 G TOTAL) BY MOUTH 2 (TWO) TIMES A DAY, Disp: 360 capsule, Rfl: 3    pantoprazole (PROTONIX) 40 mg tablet, Take 1 tablet (40 mg total) by mouth daily, Disp: 90 tablet, Rfl: 1    PARoxetine (PAXIL) 10 mg tablet, TAKE 1 TABLET DAILY AS DIRECTED , Disp: 90 tablet, Rfl: 1    valsartan-hydrochlorothiazide (DIOVAN-HCT) 160-12 5 MG per tablet, TAKE 1 TABLET BY MOUTH EVERY DAY, Disp: 90 tablet, Rfl: 1    ibuprofen (MOTRIN) 800 mg tablet, TAKE 1 TABLET BY MOUTH EVERY 8 HOURS WITH FOOD AS NEEDED, Disp: , Rfl: 0  Allergies   Allergen Reactions    Atorvastatin        Labs:not applicable  Imaging: No results found      Review of Systems:  Review of Systems   All other systems reviewed and are negative  Physical Exam:  /78 (BP Location: Right arm, Patient Position: Sitting, Cuff Size: Adult)   Pulse 69   Ht 5' 9" (1 753 m)   Wt 98 kg (216 lb)   BMI 31 90 kg/m²   Physical Exam   Constitutional: He is oriented to person, place, and time  He appears well-developed and well-nourished  HENT:   Head: Normocephalic and atraumatic  Eyes: Pupils are equal, round, and reactive to light  Conjunctivae are normal    Neck: Normal range of motion  Neck supple  Cardiovascular: Normal rate and normal heart sounds  Pulmonary/Chest: Effort normal and breath sounds normal    Neurological: He is alert and oriented to person, place, and time  Skin: Skin is warm and dry  Psychiatric: He has a normal mood and affect  Vitals reviewed  Discussion/Summary:I will continue the patient's present medical regimen  Patient appears well compensated  I have asked the patient to call if there is a problem in the interim otherwise I will see the patient in one years time  Patient is cleared from my point of view for his oral surgery procedure

## 2019-10-11 ENCOUNTER — OFFICE VISIT (OUTPATIENT)
Dept: CARDIOLOGY CLINIC | Facility: CLINIC | Age: 74
End: 2019-10-11
Payer: COMMERCIAL

## 2019-10-11 VITALS
DIASTOLIC BLOOD PRESSURE: 78 MMHG | SYSTOLIC BLOOD PRESSURE: 112 MMHG | HEART RATE: 69 BPM | BODY MASS INDEX: 31.99 KG/M2 | HEIGHT: 69 IN | WEIGHT: 216 LBS

## 2019-10-11 DIAGNOSIS — Z01.810 PREOPERATIVE CARDIOVASCULAR EXAMINATION: Primary | ICD-10-CM

## 2019-10-11 DIAGNOSIS — E78.00 PURE HYPERCHOLESTEROLEMIA: ICD-10-CM

## 2019-10-11 DIAGNOSIS — I10 ESSENTIAL HYPERTENSION: ICD-10-CM

## 2019-10-11 DIAGNOSIS — I25.10 CORONARY ARTERIOSCLEROSIS: ICD-10-CM

## 2019-10-11 PROCEDURE — 3074F SYST BP LT 130 MM HG: CPT | Performed by: INTERNAL MEDICINE

## 2019-10-11 PROCEDURE — 93000 ELECTROCARDIOGRAM COMPLETE: CPT | Performed by: INTERNAL MEDICINE

## 2019-10-11 PROCEDURE — 99214 OFFICE O/P EST MOD 30 MIN: CPT | Performed by: INTERNAL MEDICINE

## 2019-10-11 PROCEDURE — 3078F DIAST BP <80 MM HG: CPT | Performed by: INTERNAL MEDICINE

## 2019-10-20 DIAGNOSIS — K21.9 CHRONIC GERD: ICD-10-CM

## 2019-10-20 RX ORDER — PANTOPRAZOLE SODIUM 40 MG/1
TABLET, DELAYED RELEASE ORAL
Qty: 90 TABLET | Refills: 1 | Status: SHIPPED | OUTPATIENT
Start: 2019-10-20 | End: 2022-07-12 | Stop reason: ALTCHOICE

## 2019-10-28 ENCOUNTER — CLINICAL SUPPORT (OUTPATIENT)
Dept: FAMILY MEDICINE CLINIC | Facility: CLINIC | Age: 74
End: 2019-10-28
Payer: COMMERCIAL

## 2019-10-28 DIAGNOSIS — Z23 NEED FOR SHINGLES VACCINE: Primary | ICD-10-CM

## 2019-10-28 PROCEDURE — 90471 IMMUNIZATION ADMIN: CPT

## 2019-10-28 PROCEDURE — 90750 HZV VACC RECOMBINANT IM: CPT

## 2019-11-04 DIAGNOSIS — I10 ESSENTIAL HYPERTENSION: ICD-10-CM

## 2019-11-04 RX ORDER — ATENOLOL 25 MG/1
25 TABLET ORAL DAILY
Qty: 90 TABLET | Refills: 1 | Status: SHIPPED | OUTPATIENT
Start: 2019-11-04

## 2019-11-18 PROBLEM — R30.0 DYSURIA: Status: ACTIVE | Noted: 2019-11-18

## 2019-11-18 PROBLEM — R19.4 BOWEL HABIT CHANGES: Status: ACTIVE | Noted: 2019-11-18

## 2019-12-09 DIAGNOSIS — E78.5 HYPERLIPIDEMIA, UNSPECIFIED HYPERLIPIDEMIA TYPE: ICD-10-CM

## 2019-12-09 RX ORDER — OMEGA-3-ACID ETHYL ESTERS 1 G/1
2 CAPSULE, LIQUID FILLED ORAL 2 TIMES DAILY
Qty: 360 CAPSULE | Refills: 3 | Status: SHIPPED | OUTPATIENT
Start: 2019-12-09

## 2019-12-12 ENCOUNTER — LAB REQUISITION (OUTPATIENT)
Dept: LAB | Facility: HOSPITAL | Age: 74
End: 2019-12-12
Payer: COMMERCIAL

## 2019-12-12 DIAGNOSIS — K57.30 DIVERTICULOSIS OF LARGE INTESTINE WITHOUT PERFORATION OR ABSCESS WITHOUT BLEEDING: ICD-10-CM

## 2019-12-12 DIAGNOSIS — Z86.010 PERSONAL HISTORY OF COLONIC POLYPS: ICD-10-CM

## 2019-12-12 DIAGNOSIS — D12.3 BENIGN NEOPLASM OF TRANSVERSE COLON: ICD-10-CM

## 2019-12-12 DIAGNOSIS — R19.4 CHANGE IN BOWEL HABIT: ICD-10-CM

## 2019-12-12 PROCEDURE — 88305 TISSUE EXAM BY PATHOLOGIST: CPT | Performed by: PATHOLOGY

## 2020-01-10 DIAGNOSIS — E78.00 PURE HYPERCHOLESTEROLEMIA: ICD-10-CM

## 2020-01-10 RX ORDER — EZETIMIBE AND SIMVASTATIN 10; 20 MG/1; MG/1
TABLET ORAL
Qty: 90 TABLET | Refills: 0 | Status: SHIPPED | OUTPATIENT
Start: 2020-01-10 | End: 2020-01-24

## 2020-01-16 ENCOUNTER — TRANSCRIBE ORDERS (OUTPATIENT)
Dept: ADMINISTRATIVE | Facility: HOSPITAL | Age: 75
End: 2020-01-16

## 2020-01-16 DIAGNOSIS — N13.8 ENLARGED PROSTATE WITH URINARY OBSTRUCTION: Primary | ICD-10-CM

## 2020-01-16 DIAGNOSIS — N40.1 ENLARGED PROSTATE WITH URINARY OBSTRUCTION: Primary | ICD-10-CM

## 2020-01-17 ENCOUNTER — HOSPITAL ENCOUNTER (OUTPATIENT)
Dept: RADIOLOGY | Age: 75
Discharge: HOME | End: 2020-01-17
Payer: COMMERCIAL

## 2020-01-17 ENCOUNTER — APPOINTMENT (OUTPATIENT)
Dept: LAB | Age: 75
End: 2020-01-17
Payer: COMMERCIAL

## 2020-01-17 ENCOUNTER — TRANSCRIBE ORDERS (OUTPATIENT)
Dept: ADMINISTRATIVE | Age: 75
End: 2020-01-17

## 2020-01-17 DIAGNOSIS — N13.8 ENLARGED PROSTATE WITH URINARY OBSTRUCTION: ICD-10-CM

## 2020-01-17 DIAGNOSIS — N13.8 ENLARGED PROSTATE WITH URINARY OBSTRUCTION: Primary | ICD-10-CM

## 2020-01-17 DIAGNOSIS — N40.1 ENLARGED PROSTATE WITH URINARY OBSTRUCTION: ICD-10-CM

## 2020-01-17 DIAGNOSIS — N40.1 ENLARGED PROSTATE WITH URINARY OBSTRUCTION: Primary | ICD-10-CM

## 2020-01-17 LAB — PSA SERPL-MCNC: 0.4 NG/ML (ref 0–4)

## 2020-01-17 PROCEDURE — 51798 US URINE CAPACITY MEASURE: CPT

## 2020-01-17 PROCEDURE — G0103 PSA SCREENING: HCPCS

## 2020-01-17 PROCEDURE — 36415 COLL VENOUS BLD VENIPUNCTURE: CPT

## 2020-01-21 DIAGNOSIS — I10 ESSENTIAL HYPERTENSION: ICD-10-CM

## 2020-01-21 RX ORDER — VALSARTAN AND HYDROCHLOROTHIAZIDE 160; 12.5 MG/1; MG/1
1 TABLET, FILM COATED ORAL DAILY
Qty: 90 TABLET | Refills: 3 | Status: SHIPPED | OUTPATIENT
Start: 2020-01-21 | End: 2020-02-17

## 2020-01-24 DIAGNOSIS — E78.00 PURE HYPERCHOLESTEROLEMIA: ICD-10-CM

## 2020-01-24 RX ORDER — EZETIMIBE AND SIMVASTATIN 10; 20 MG/1; MG/1
1 TABLET ORAL DAILY
Qty: 90 TABLET | Refills: 3 | Status: SHIPPED | OUTPATIENT
Start: 2020-01-24 | End: 2021-07-08 | Stop reason: ALTCHOICE

## 2020-02-17 DIAGNOSIS — I10 ESSENTIAL HYPERTENSION: ICD-10-CM

## 2020-02-17 RX ORDER — VALSARTAN AND HYDROCHLOROTHIAZIDE 160; 12.5 MG/1; MG/1
TABLET, FILM COATED ORAL
Qty: 90 TABLET | Refills: 1 | Status: SHIPPED | OUTPATIENT
Start: 2020-02-17

## 2020-06-11 ENCOUNTER — TELEPHONE (OUTPATIENT)
Dept: FAMILY MEDICINE CLINIC | Facility: CLINIC | Age: 75
End: 2020-06-11

## 2020-06-12 DIAGNOSIS — D69.6 THROMBOCYTOPENIA (HCC): ICD-10-CM

## 2020-06-12 DIAGNOSIS — R73.01 IMPAIRED FASTING GLUCOSE: ICD-10-CM

## 2020-06-12 DIAGNOSIS — E78.2 MIXED HYPERLIPIDEMIA: Primary | ICD-10-CM

## 2020-06-12 DIAGNOSIS — I10 ESSENTIAL HYPERTENSION: ICD-10-CM

## 2020-06-12 DIAGNOSIS — Z11.59 NEED FOR HEPATITIS C SCREENING TEST: ICD-10-CM

## 2020-06-16 ENCOUNTER — LAB (OUTPATIENT)
Dept: LAB | Facility: CLINIC | Age: 75
End: 2020-06-16
Payer: COMMERCIAL

## 2020-06-16 DIAGNOSIS — Z11.59 NEED FOR HEPATITIS C SCREENING TEST: ICD-10-CM

## 2020-06-16 LAB
ALBUMIN SERPL BCP-MCNC: 3.5 G/DL (ref 3.5–5)
ALP SERPL-CCNC: 86 U/L (ref 46–116)
ALT SERPL W P-5'-P-CCNC: 31 U/L (ref 12–78)
ANION GAP SERPL CALCULATED.3IONS-SCNC: 4 MMOL/L (ref 4–13)
AST SERPL W P-5'-P-CCNC: 31 U/L (ref 5–45)
BASOPHILS # BLD AUTO: 0.05 THOUSANDS/ΜL (ref 0–0.1)
BASOPHILS NFR BLD AUTO: 1 % (ref 0–1)
BILIRUB SERPL-MCNC: 0.67 MG/DL (ref 0.2–1)
BUN SERPL-MCNC: 15 MG/DL (ref 5–25)
CALCIUM SERPL-MCNC: 8.9 MG/DL (ref 8.3–10.1)
CHLORIDE SERPL-SCNC: 105 MMOL/L (ref 100–108)
CHOLEST SERPL-MCNC: 114 MG/DL (ref 50–200)
CO2 SERPL-SCNC: 28 MMOL/L (ref 21–32)
CREAT SERPL-MCNC: 0.92 MG/DL (ref 0.6–1.3)
EOSINOPHIL # BLD AUTO: 1.08 THOUSAND/ΜL (ref 0–0.61)
EOSINOPHIL NFR BLD AUTO: 17 % (ref 0–6)
ERYTHROCYTE [DISTWIDTH] IN BLOOD BY AUTOMATED COUNT: 12.4 % (ref 11.6–15.1)
EST. AVERAGE GLUCOSE BLD GHB EST-MCNC: 114 MG/DL
GFR SERPL CREATININE-BSD FRML MDRD: 82 ML/MIN/1.73SQ M
GLUCOSE P FAST SERPL-MCNC: 110 MG/DL (ref 65–99)
HBA1C MFR BLD: 5.6 %
HCT VFR BLD AUTO: 40.8 % (ref 36.5–49.3)
HCV AB SER QL: NORMAL
HDLC SERPL-MCNC: 51 MG/DL
HGB BLD-MCNC: 13.8 G/DL (ref 12–17)
IMM GRANULOCYTES # BLD AUTO: 0.01 THOUSAND/UL (ref 0–0.2)
IMM GRANULOCYTES NFR BLD AUTO: 0 % (ref 0–2)
LDLC SERPL CALC-MCNC: 36 MG/DL (ref 0–100)
LYMPHOCYTES # BLD AUTO: 1.88 THOUSANDS/ΜL (ref 0.6–4.47)
LYMPHOCYTES NFR BLD AUTO: 30 % (ref 14–44)
MCH RBC QN AUTO: 34.2 PG (ref 26.8–34.3)
MCHC RBC AUTO-ENTMCNC: 33.8 G/DL (ref 31.4–37.4)
MCV RBC AUTO: 101 FL (ref 82–98)
MONOCYTES # BLD AUTO: 0.49 THOUSAND/ΜL (ref 0.17–1.22)
MONOCYTES NFR BLD AUTO: 8 % (ref 4–12)
NEUTROPHILS # BLD AUTO: 2.85 THOUSANDS/ΜL (ref 1.85–7.62)
NEUTS SEG NFR BLD AUTO: 44 % (ref 43–75)
NONHDLC SERPL-MCNC: 63 MG/DL
NRBC BLD AUTO-RTO: 0 /100 WBCS
PLATELET # BLD AUTO: 150 THOUSANDS/UL (ref 149–390)
PMV BLD AUTO: 9.5 FL (ref 8.9–12.7)
POTASSIUM SERPL-SCNC: 3.7 MMOL/L (ref 3.5–5.3)
PROT SERPL-MCNC: 7 G/DL (ref 6.4–8.2)
RBC # BLD AUTO: 4.04 MILLION/UL (ref 3.88–5.62)
SODIUM SERPL-SCNC: 137 MMOL/L (ref 136–145)
TRIGL SERPL-MCNC: 136 MG/DL
WBC # BLD AUTO: 6.36 THOUSAND/UL (ref 4.31–10.16)

## 2020-06-16 PROCEDURE — 36415 COLL VENOUS BLD VENIPUNCTURE: CPT | Performed by: FAMILY MEDICINE

## 2020-06-16 PROCEDURE — 86803 HEPATITIS C AB TEST: CPT

## 2020-06-16 PROCEDURE — 83036 HEMOGLOBIN GLYCOSYLATED A1C: CPT | Performed by: FAMILY MEDICINE

## 2020-06-16 PROCEDURE — 80061 LIPID PANEL: CPT | Performed by: FAMILY MEDICINE

## 2020-06-16 PROCEDURE — 85025 COMPLETE CBC W/AUTO DIFF WBC: CPT | Performed by: FAMILY MEDICINE

## 2020-06-16 PROCEDURE — 80053 COMPREHEN METABOLIC PANEL: CPT | Performed by: FAMILY MEDICINE

## 2020-06-30 ENCOUNTER — OFFICE VISIT (OUTPATIENT)
Dept: FAMILY MEDICINE CLINIC | Facility: CLINIC | Age: 75
End: 2020-06-30
Payer: COMMERCIAL

## 2020-06-30 ENCOUNTER — TELEPHONE (OUTPATIENT)
Dept: FAMILY MEDICINE CLINIC | Facility: CLINIC | Age: 75
End: 2020-06-30

## 2020-06-30 VITALS
RESPIRATION RATE: 16 BRPM | SYSTOLIC BLOOD PRESSURE: 116 MMHG | HEART RATE: 72 BPM | BODY MASS INDEX: 31.4 KG/M2 | DIASTOLIC BLOOD PRESSURE: 64 MMHG | WEIGHT: 212 LBS | TEMPERATURE: 96.9 F | HEIGHT: 69 IN

## 2020-06-30 DIAGNOSIS — D69.6 THROMBOCYTOPENIA (HCC): ICD-10-CM

## 2020-06-30 DIAGNOSIS — N40.0 BENIGN PROSTATIC HYPERPLASIA WITHOUT URINARY OBSTRUCTION: ICD-10-CM

## 2020-06-30 DIAGNOSIS — R73.01 IMPAIRED FASTING GLUCOSE: ICD-10-CM

## 2020-06-30 DIAGNOSIS — E78.2 MIXED HYPERLIPIDEMIA: ICD-10-CM

## 2020-06-30 DIAGNOSIS — K21.9 GERD WITHOUT ESOPHAGITIS: ICD-10-CM

## 2020-06-30 DIAGNOSIS — I65.23 BILATERAL CAROTID ARTERY STENOSIS: ICD-10-CM

## 2020-06-30 DIAGNOSIS — Z00.00 WELL ADULT EXAM: Primary | ICD-10-CM

## 2020-06-30 DIAGNOSIS — I25.10 ATHEROSCLEROSIS OF NATIVE CORONARY ARTERY OF NATIVE HEART WITHOUT ANGINA PECTORIS: ICD-10-CM

## 2020-06-30 DIAGNOSIS — I10 ESSENTIAL HYPERTENSION: ICD-10-CM

## 2020-06-30 PROBLEM — R19.4 BOWEL HABIT CHANGES: Status: RESOLVED | Noted: 2019-11-18 | Resolved: 2020-06-30

## 2020-06-30 PROBLEM — F32.A DEPRESSIVE DISORDER: Status: ACTIVE | Noted: 2020-06-30

## 2020-06-30 PROBLEM — R30.0 DYSURIA: Status: RESOLVED | Noted: 2019-11-18 | Resolved: 2020-06-30

## 2020-06-30 PROCEDURE — 1101F PT FALLS ASSESS-DOCD LE1/YR: CPT | Performed by: FAMILY MEDICINE

## 2020-06-30 PROCEDURE — 1160F RVW MEDS BY RX/DR IN RCRD: CPT | Performed by: FAMILY MEDICINE

## 2020-06-30 PROCEDURE — 99397 PER PM REEVAL EST PAT 65+ YR: CPT | Performed by: FAMILY MEDICINE

## 2020-06-30 PROCEDURE — 3288F FALL RISK ASSESSMENT DOCD: CPT | Performed by: FAMILY MEDICINE

## 2020-06-30 PROCEDURE — 3008F BODY MASS INDEX DOCD: CPT | Performed by: PHYSICIAN ASSISTANT

## 2020-06-30 NOTE — TELEPHONE ENCOUNTER
Patient called stating he forgot to ask you for your  opinion on him taking Viagra  Is this ok for him/ an option? Please advise   [phone]

## 2020-07-01 DIAGNOSIS — N52.9 ERECTILE DYSFUNCTION, UNSPECIFIED ERECTILE DYSFUNCTION TYPE: Primary | ICD-10-CM

## 2020-07-01 RX ORDER — SILDENAFIL 100 MG/1
100 TABLET, FILM COATED ORAL DAILY PRN
Qty: 6 TABLET | Refills: 5 | Status: SHIPPED | OUTPATIENT
Start: 2020-07-01 | End: 2022-08-05

## 2020-07-20 ENCOUNTER — OFFICE VISIT (OUTPATIENT)
Dept: FAMILY MEDICINE CLINIC | Facility: CLINIC | Age: 75
End: 2020-07-20
Payer: COMMERCIAL

## 2020-07-20 VITALS
WEIGHT: 211 LBS | HEART RATE: 62 BPM | SYSTOLIC BLOOD PRESSURE: 122 MMHG | TEMPERATURE: 96.3 F | DIASTOLIC BLOOD PRESSURE: 60 MMHG | BODY MASS INDEX: 31.16 KG/M2 | RESPIRATION RATE: 16 BRPM

## 2020-07-20 DIAGNOSIS — M75.101 TEAR OF RIGHT ROTATOR CUFF, UNSPECIFIED TEAR EXTENT, UNSPECIFIED WHETHER TRAUMATIC: ICD-10-CM

## 2020-07-20 DIAGNOSIS — R07.81 RIB PAIN ON RIGHT SIDE: Primary | ICD-10-CM

## 2020-07-20 PROCEDURE — 3078F DIAST BP <80 MM HG: CPT | Performed by: PHYSICIAN ASSISTANT

## 2020-07-20 PROCEDURE — 99213 OFFICE O/P EST LOW 20 MIN: CPT | Performed by: PHYSICIAN ASSISTANT

## 2020-07-20 PROCEDURE — 4040F PNEUMOC VAC/ADMIN/RCVD: CPT | Performed by: PHYSICIAN ASSISTANT

## 2020-07-20 PROCEDURE — 1160F RVW MEDS BY RX/DR IN RCRD: CPT | Performed by: PHYSICIAN ASSISTANT

## 2020-07-20 PROCEDURE — 1036F TOBACCO NON-USER: CPT | Performed by: PHYSICIAN ASSISTANT

## 2020-07-20 PROCEDURE — 3074F SYST BP LT 130 MM HG: CPT | Performed by: PHYSICIAN ASSISTANT

## 2020-07-20 NOTE — PROGRESS NOTES
Assessment/Plan:     Diagnoses and all orders for this visit:    Rib pain on right side  No known traumatic etiology  Discussed possible causes including costochondritis, rib fracture, referred pain  - discussed limited use of NSAIDs  - Awaiting results of right shoulder MRI in 10 days    Tear of right rotator cuff, unspecified tear extent, unspecified whether traumatic  Managed by [facility]  Pending MRI in 10 days        Subjective:    Patient ID: Дмитрий Galvez is a 76 y o  male  Pt is presenting today for Right rib pain for 4+ weeks  Pain with coughing and sneezing    Pain occurs with moving hands forward ie tying shoe laces  Today ye took 400 mg Ibuprofen which provides modest relief  Denies any inciting event/trauma  Prior rotator cuff surgery in 90s  He was seen at [facility] 3 days ago was diagnosed with rotator cuff repair  He has an MRI scheduled in 10 days    He is right hand dominant  The following portions of the patient's history were reviewed and updated as appropriate: allergies, current medications and problem list     Review of Systems   Constitutional: Negative for activity change, fatigue, fever and unexpected weight change  HENT: Negative for rhinorrhea and sore throat  Respiratory: Negative for cough, shortness of breath and wheezing  Cardiovascular: Negative for chest pain, palpitations and leg swelling  Gastrointestinal: Negative for constipation, diarrhea, nausea and vomiting  Musculoskeletal: Positive for myalgias (right shoulder)  Negative for back pain, neck pain and neck stiffness  Skin: Negative for rash  Objective:  /60   Pulse 62   Temp (!) 96 3 °F (35 7 °C)   Resp 16   Wt 95 7 kg (211 lb)   BMI 31 16 kg/m²      Physical Exam   Constitutional: He is oriented to person, place, and time  He appears well-developed and well-nourished  No distress  HENT:   Head: Normocephalic and atraumatic     Eyes: Pupils are equal, round, and reactive to light  EOM are normal    Cardiovascular: Normal rate, regular rhythm, normal heart sounds and intact distal pulses  Exam reveals no gallop and no friction rub  No murmur heard  Pulmonary/Chest: Effort normal and breath sounds normal  No respiratory distress  He has no wheezes  Chest wall is not dull to percussion  He exhibits tenderness  He exhibits no mass, no laceration, no crepitus, no edema, no deformity, no swelling and no retraction  Neurological: He is alert and oriented to person, place, and time  Skin: Skin is warm and dry  He is not diaphoretic  Psychiatric: He has a normal mood and affect  His behavior is normal  Thought content normal    Vitals reviewed

## 2020-09-01 ENCOUNTER — OFFICE VISIT (OUTPATIENT)
Dept: FAMILY MEDICINE CLINIC | Facility: CLINIC | Age: 75
End: 2020-09-01
Payer: COMMERCIAL

## 2020-09-01 VITALS
HEIGHT: 68 IN | DIASTOLIC BLOOD PRESSURE: 78 MMHG | SYSTOLIC BLOOD PRESSURE: 116 MMHG | HEART RATE: 84 BPM | BODY MASS INDEX: 32.43 KG/M2 | WEIGHT: 214 LBS | TEMPERATURE: 97.2 F

## 2020-09-01 DIAGNOSIS — M54.16 LUMBAR BACK PAIN WITH RADICULOPATHY AFFECTING LEFT LOWER EXTREMITY: Primary | ICD-10-CM

## 2020-09-01 PROCEDURE — 99213 OFFICE O/P EST LOW 20 MIN: CPT | Performed by: PHYSICIAN ASSISTANT

## 2020-09-01 PROCEDURE — 3078F DIAST BP <80 MM HG: CPT | Performed by: PHYSICIAN ASSISTANT

## 2020-09-01 PROCEDURE — 3074F SYST BP LT 130 MM HG: CPT | Performed by: PHYSICIAN ASSISTANT

## 2020-09-01 RX ORDER — METHOCARBAMOL 500 MG/1
500 TABLET, FILM COATED ORAL
Qty: 20 TABLET | Refills: 0 | Status: SHIPPED | OUTPATIENT
Start: 2020-09-01 | End: 2021-07-08 | Stop reason: ALTCHOICE

## 2020-09-01 NOTE — PROGRESS NOTES
Assessment/Plan:     Diagnoses and all orders for this visit:    Lumbar back pain with radiculopathy affecting left lower extremity  Triggered by twisting bending 4 weeks ago  - Recommended continued Tylenol  Discussed very limited use of Motrin 600mg with food, only during exacerbations  - Ordered  Flexeril/Robaxin up to BID, warned patient that it is sedative  Directed to avoid driving or heavy equipment use  - Directed use of ice packs, continued activity  - Directed to FU with Physical Therapy if symptoms perists  - Directed to FU in 2 weeks if pain persist or numbness of leg worsens          Subjective:    Patient ID: Augie Roberson is a 76 y o  male  Pt is presenting today for Left sided lower back pain radiating down left leg for 2 weeks    Started out after getting out of pool and pulled and twisted his back  Denies feeling pop  Taking Ibuprofen 400 mg twice daily  Improves with movement  Back pain similar to prior episodes  Back Pain   This is a new problem  Episode onset: 2 weeks  The pain is present in the gluteal  The pain radiates to the left knee (left ankle)  The pain is moderate  Associated symptoms include leg pain  Pertinent negatives include no bladder incontinence, bowel incontinence, chest pain, fever, headaches, numbness, paresis, paresthesias or tingling  He has tried nothing for the symptoms  The following portions of the patient's history were reviewed and updated as appropriate: allergies, current medications and problem list     Review of Systems   Constitutional: Negative for activity change, fatigue, fever and unexpected weight change  HENT: Negative for rhinorrhea and sore throat  Respiratory: Negative for cough, shortness of breath and wheezing  Cardiovascular: Negative for chest pain, palpitations and leg swelling  Gastrointestinal: Negative for bowel incontinence, constipation, diarrhea, nausea and vomiting     Genitourinary: Negative for bladder incontinence  Musculoskeletal: Positive for back pain  Negative for neck pain and neck stiffness  Skin: Negative for rash  Neurological: Negative for tingling, numbness, headaches and paresthesias  Objective:  /78 (BP Location: Left arm, Patient Position: Sitting, Cuff Size: Standard)   Pulse 84   Temp (!) 97 2 °F (36 2 °C)   Ht 5' 8" (1 727 m)   Wt 97 1 kg (214 lb)   BMI 32 54 kg/m²      Physical Exam  Vitals signs reviewed  Constitutional:       General: He is not in acute distress  Appearance: He is well-developed  He is not diaphoretic  HENT:      Head: Normocephalic and atraumatic  Eyes:      Pupils: Pupils are equal, round, and reactive to light  Cardiovascular:      Rate and Rhythm: Normal rate and regular rhythm  Heart sounds: Normal heart sounds  No murmur  No friction rub  No gallop  Pulmonary:      Effort: Pulmonary effort is normal  No respiratory distress  Breath sounds: Normal breath sounds  No wheezing  Musculoskeletal:      Lumbar back: He exhibits tenderness and spasm  He exhibits no pain  Back:       Comments: 5/5 LLE bilateral strength   Skin:     General: Skin is warm and dry  Neurological:      Mental Status: He is alert and oriented to person, place, and time  Psychiatric:         Behavior: Behavior normal          Thought Content:  Thought content normal

## 2020-10-05 ENCOUNTER — NURSE TRIAGE (OUTPATIENT)
Dept: OTHER | Facility: OTHER | Age: 75
End: 2020-10-05

## 2020-10-05 DIAGNOSIS — Z11.59 ENCOUNTER FOR SCREENING FOR OTHER VIRAL DISEASES: Primary | ICD-10-CM

## 2020-10-06 ENCOUNTER — TELEMEDICINE (OUTPATIENT)
Dept: FAMILY MEDICINE CLINIC | Facility: CLINIC | Age: 75
End: 2020-10-06
Payer: COMMERCIAL

## 2020-10-06 DIAGNOSIS — Z20.828 EXPOSURE TO SARS-ASSOCIATED CORONAVIRUS: Primary | ICD-10-CM

## 2020-10-06 DIAGNOSIS — Z11.59 ENCOUNTER FOR SCREENING FOR OTHER VIRAL DISEASES: ICD-10-CM

## 2020-10-06 PROCEDURE — 1160F RVW MEDS BY RX/DR IN RCRD: CPT | Performed by: PHYSICIAN ASSISTANT

## 2020-10-06 PROCEDURE — 99214 OFFICE O/P EST MOD 30 MIN: CPT | Performed by: PHYSICIAN ASSISTANT

## 2020-10-06 PROCEDURE — 1036F TOBACCO NON-USER: CPT | Performed by: PHYSICIAN ASSISTANT

## 2020-10-06 PROCEDURE — U0003 INFECTIOUS AGENT DETECTION BY NUCLEIC ACID (DNA OR RNA); SEVERE ACUTE RESPIRATORY SYNDROME CORONAVIRUS 2 (SARS-COV-2) (CORONAVIRUS DISEASE [COVID-19]), AMPLIFIED PROBE TECHNIQUE, MAKING USE OF HIGH THROUGHPUT TECHNOLOGIES AS DESCRIBED BY CMS-2020-01-R: HCPCS | Performed by: FAMILY MEDICINE

## 2020-10-07 LAB — SARS-COV-2 RNA SPEC QL NAA+PROBE: NOT DETECTED

## 2020-10-29 ENCOUNTER — OFFICE VISIT (OUTPATIENT)
Dept: CARDIOLOGY CLINIC | Facility: CLINIC | Age: 75
End: 2020-10-29
Payer: COMMERCIAL

## 2020-10-29 VITALS
DIASTOLIC BLOOD PRESSURE: 60 MMHG | HEIGHT: 68 IN | TEMPERATURE: 96.9 F | SYSTOLIC BLOOD PRESSURE: 120 MMHG | BODY MASS INDEX: 32.58 KG/M2 | WEIGHT: 215 LBS | HEART RATE: 60 BPM

## 2020-10-29 DIAGNOSIS — E78.00 PURE HYPERCHOLESTEROLEMIA: ICD-10-CM

## 2020-10-29 DIAGNOSIS — I25.10 CORONARY ARTERIOSCLEROSIS: ICD-10-CM

## 2020-10-29 DIAGNOSIS — I10 ESSENTIAL HYPERTENSION: Primary | ICD-10-CM

## 2020-10-29 PROCEDURE — 3078F DIAST BP <80 MM HG: CPT | Performed by: INTERNAL MEDICINE

## 2020-10-29 PROCEDURE — 1160F RVW MEDS BY RX/DR IN RCRD: CPT | Performed by: INTERNAL MEDICINE

## 2020-10-29 PROCEDURE — 93000 ELECTROCARDIOGRAM COMPLETE: CPT | Performed by: INTERNAL MEDICINE

## 2020-10-29 PROCEDURE — 99214 OFFICE O/P EST MOD 30 MIN: CPT | Performed by: INTERNAL MEDICINE

## 2020-10-29 PROCEDURE — 3074F SYST BP LT 130 MM HG: CPT | Performed by: INTERNAL MEDICINE

## 2020-10-29 PROCEDURE — 3008F BODY MASS INDEX DOCD: CPT | Performed by: INTERNAL MEDICINE

## 2020-10-29 RX ORDER — PAROXETINE 10 MG/1
10 TABLET, FILM COATED ORAL DAILY
COMMUNITY

## 2021-01-08 ENCOUNTER — HOSPITAL ENCOUNTER (OUTPATIENT)
Dept: NON INVASIVE DIAGNOSTICS | Facility: CLINIC | Age: 76
Discharge: HOME | End: 2021-01-08
Payer: COMMERCIAL

## 2021-01-08 DIAGNOSIS — I10 ESSENTIAL HYPERTENSION: ICD-10-CM

## 2021-01-08 DIAGNOSIS — E78.00 PURE HYPERCHOLESTEROLEMIA: ICD-10-CM

## 2021-01-08 DIAGNOSIS — I25.10 CORONARY ARTERIOSCLEROSIS: ICD-10-CM

## 2021-01-08 PROCEDURE — 93306 TTE W/DOPPLER COMPLETE: CPT

## 2021-01-11 PROCEDURE — 93306 TTE W/DOPPLER COMPLETE: CPT | Performed by: INTERNAL MEDICINE

## 2021-01-12 ENCOUNTER — IMMUNIZATIONS (OUTPATIENT)
Dept: FAMILY MEDICINE CLINIC | Facility: HOSPITAL | Age: 76
End: 2021-01-12

## 2021-01-12 DIAGNOSIS — Z23 ENCOUNTER FOR IMMUNIZATION: ICD-10-CM

## 2021-01-12 PROCEDURE — 91300 SARS-COV-2 / COVID-19 MRNA VACCINE (PFIZER-BIONTECH) 30 MCG: CPT

## 2021-01-12 PROCEDURE — 0001A SARS-COV-2 / COVID-19 MRNA VACCINE (PFIZER-BIONTECH) 30 MCG: CPT

## 2021-01-28 ENCOUNTER — TELEPHONE (OUTPATIENT)
Dept: CARDIOLOGY CLINIC | Facility: CLINIC | Age: 76
End: 2021-01-28

## 2021-01-28 DIAGNOSIS — I25.10 ATHEROSCLEROSIS OF NATIVE CORONARY ARTERY OF NATIVE HEART WITHOUT ANGINA PECTORIS: Primary | ICD-10-CM

## 2021-01-28 DIAGNOSIS — I25.10 CORONARY ARTERIOSCLEROSIS: ICD-10-CM

## 2021-01-28 NOTE — TELEPHONE ENCOUNTER
Travis Sweet can see the results of Echo done on 1/8/21 which states it was a normal study  He said he was surprised at the result as he frequently feels sob and feels an occasional quick "jab" in the left side of his chest   He was sure something abnormal would be found  He thinks he mentioned the sob at the ov in October, but is not sure  Please advise of any further recommendations

## 2021-01-28 NOTE — TELEPHONE ENCOUNTER
Marichuy Mendez is aware of Lexiscan stress test order  I provided the number to [facility] Scheduling

## 2021-02-04 ENCOUNTER — HOSPITAL ENCOUNTER (OUTPATIENT)
Dept: NON INVASIVE DIAGNOSTICS | Facility: CLINIC | Age: 76
Discharge: HOME | End: 2021-02-04
Payer: COMMERCIAL

## 2021-02-04 DIAGNOSIS — I25.10 CORONARY ARTERIOSCLEROSIS: ICD-10-CM

## 2021-02-04 DIAGNOSIS — I25.10 ATHEROSCLEROSIS OF NATIVE CORONARY ARTERY OF NATIVE HEART WITHOUT ANGINA PECTORIS: ICD-10-CM

## 2021-02-04 LAB
CHEST PAIN STATEMENT: NORMAL
MAX DIASTOLIC BP: 74 MMHG
MAX HEART RATE: 90 BPM
MAX PREDICTED HEART RATE: 145 BPM
MAX. SYSTOLIC BP: 132 MMHG
PROTOCOL NAME: NORMAL
REASON FOR TERMINATION: NORMAL
TARGET HR FORMULA: NORMAL
TEST INDICATION: NORMAL
TIME IN EXERCISE PHASE: NORMAL

## 2021-02-04 PROCEDURE — 93018 CV STRESS TEST I&R ONLY: CPT | Performed by: INTERNAL MEDICINE

## 2021-02-04 PROCEDURE — G1004 CDSM NDSC: HCPCS

## 2021-02-04 PROCEDURE — A9502 TC99M TETROFOSMIN: HCPCS

## 2021-02-04 PROCEDURE — 78452 HT MUSCLE IMAGE SPECT MULT: CPT

## 2021-02-04 PROCEDURE — 93017 CV STRESS TEST TRACING ONLY: CPT

## 2021-02-04 PROCEDURE — 93016 CV STRESS TEST SUPVJ ONLY: CPT | Performed by: INTERNAL MEDICINE

## 2021-02-04 PROCEDURE — 78452 HT MUSCLE IMAGE SPECT MULT: CPT | Performed by: INTERNAL MEDICINE

## 2021-02-04 RX ADMIN — REGADENOSON 0.4 MG: 0.08 INJECTION, SOLUTION INTRAVENOUS at 09:35

## 2021-02-05 ENCOUNTER — IMMUNIZATIONS (OUTPATIENT)
Dept: FAMILY MEDICINE CLINIC | Facility: HOSPITAL | Age: 76
End: 2021-02-05

## 2021-02-05 DIAGNOSIS — Z23 ENCOUNTER FOR IMMUNIZATION: Primary | ICD-10-CM

## 2021-02-05 PROCEDURE — 91300 SARS-COV-2 / COVID-19 MRNA VACCINE (PFIZER-BIONTECH) 30 MCG: CPT

## 2021-02-05 PROCEDURE — 0002A SARS-COV-2 / COVID-19 MRNA VACCINE (PFIZER-BIONTECH) 30 MCG: CPT

## 2021-02-25 ENCOUNTER — TRANSCRIBE ORDERS (OUTPATIENT)
Dept: LAB | Facility: CLINIC | Age: 76
End: 2021-02-25

## 2021-02-25 ENCOUNTER — LAB (OUTPATIENT)
Dept: LAB | Facility: CLINIC | Age: 76
End: 2021-02-25
Payer: COMMERCIAL

## 2021-02-25 DIAGNOSIS — M42.00 KYPHOSIS DUE TO JUVENILE OSTEOCHONDROSIS: Primary | ICD-10-CM

## 2021-02-25 DIAGNOSIS — M42.00 KYPHOSIS DUE TO JUVENILE OSTEOCHONDROSIS: ICD-10-CM

## 2021-02-25 DIAGNOSIS — M40.10 KYPHOSIS DUE TO JUVENILE OSTEOCHONDROSIS: Primary | ICD-10-CM

## 2021-02-25 DIAGNOSIS — M40.10 KYPHOSIS DUE TO JUVENILE OSTEOCHONDROSIS: ICD-10-CM

## 2021-02-25 LAB
BUN SERPL-MCNC: 15 MG/DL (ref 5–25)
CREAT SERPL-MCNC: 0.97 MG/DL (ref 0.6–1.3)
GFR SERPL CREATININE-BSD FRML MDRD: 76 ML/MIN/1.73SQ M
PSA SERPL-MCNC: 0.4 NG/ML (ref 0–4)

## 2021-02-25 PROCEDURE — 82565 ASSAY OF CREATININE: CPT

## 2021-02-25 PROCEDURE — 36415 COLL VENOUS BLD VENIPUNCTURE: CPT

## 2021-02-25 PROCEDURE — 84153 ASSAY OF PSA TOTAL: CPT

## 2021-02-25 PROCEDURE — 84520 ASSAY OF UREA NITROGEN: CPT

## 2021-06-23 ENCOUNTER — TELEPHONE (OUTPATIENT)
Dept: FAMILY MEDICINE CLINIC | Facility: CLINIC | Age: 76
End: 2021-06-23

## 2021-06-23 NOTE — TELEPHONE ENCOUNTER
Patient wife called  She stated patient has been experiencing on and off symptoms such as fever 100 3, chills, night sweats, fatigue  She said this has been going for more then a week  She requested lab work for patient  Should patient have any concerns  Patient was scheduled a virtual visit for tomorrow  There were no more apts for today  Patient wife stated he has a heart condition  Please advise  She stated he feels fine now but its on and off

## 2021-06-24 ENCOUNTER — TELEMEDICINE (OUTPATIENT)
Dept: FAMILY MEDICINE CLINIC | Facility: CLINIC | Age: 76
End: 2021-06-24
Payer: COMMERCIAL

## 2021-06-24 DIAGNOSIS — Z91.89 AT HIGH RISK FOR TICK BORNE ILLNESS: ICD-10-CM

## 2021-06-24 DIAGNOSIS — R50.9 FEVER OF UNKNOWN ORIGIN: Primary | ICD-10-CM

## 2021-06-24 PROCEDURE — 99213 OFFICE O/P EST LOW 20 MIN: CPT | Performed by: FAMILY MEDICINE

## 2021-06-24 NOTE — PROGRESS NOTES
Virtual Brief Visit    Assessment/Plan:    Problem List Items Addressed This Visit     None      Visit Diagnoses     Fever of unknown origin    -  Primary    Relevant Orders    CBC and differential    Comprehensive metabolic panel    C-reactive protein    Lyme Antibody Profile with reflex to WB    UA w Reflex to Microscopic w Reflex to Culture -Lab Collect    Blood culture    At high risk for tick borne illness        Relevant Orders    Lyme Antibody Profile with reflex to WB          Patient with fever of unknown origin, primarily spiking in the evening for the last 2 weeks  Unclear if there are any new exposures  He does have associated symptoms of decreased appetite and fatigue  Signs and symptoms concerning for possible illness versus autoimmune condition versus malignancy  Will assess laboratory studies and urinalysis  Further workup pending result of above tests  Consider possibility of joint infection given recent shoulder surgery, significantly elevated CRP would pursue evaluation of right shoulder for septic arthritis  Reason for visit is   Chief Complaint   Patient presents with    Virtual Regular Visit    Virtual Brief Visit        Encounter provider Guadalupe Ruiz MD    Provider located at [address] [address] [phone]    Recent Visits  Date Type Provider Dept   06/23/21 Telephone Carlos Newsome 12 recent visits within past 7 days and meeting all other requirements  Today's Visits  Date Type Provider Dept   06/24/21 129 Raul Albright MD [facility]   Showing today's visits and meeting all other requirements  Future Appointments  No visits were found meeting these conditions  Showing future appointments within next 150 days and meeting all other requirements       After connecting through telephone, the patient was identified by name and date of birth   Dusty Braxton was informed that this is a telemedicine visit and that the visit is being conducted through telephone  My office door was closed  No one else was in the room  He acknowledged consent and understanding of privacy and security of the platform  The patient has agreed to participate and understands he can discontinue the visit at any time  Patient is aware this is a billable service  Bonita Bloch is a 68 y o  male who presents for virtual sick visit  HPI     He reports that he has been having fevers, night sweats for the last two weeks  Fevers occur primarily at night time  He reports fatigue  He reports decreased appetite  He denies weight loss  He reports that his bowel movements have been regular  He denies any rashes or any other symptoms  His wife reports that he had a fever several months ago, he had a COVID test at that time that was negative  He had right shoulder surgery 2 months ago, he has been in PT  He had his final follow-up today insert general reportedly said everything was looking good      Past Medical History:   Diagnosis Date    Abnormal electrocardiography     resolved: 11/21/2017    Abnormal laboratory test     last assessed: 11/16/2016    Acute myocardial infarction of inferior wall ([facility] [address] ) 1/20/2015    Anxiety     Community acquired pneumonia     last assessed: 10/15/2015    Complete tear of left rotator cuff     unspecified laterality    Coronary artery disease     Depression     GERD (gastroesophageal reflux disease)     Hyperlipidemia     Hypertension     Impingement syndrome of left shoulder 3/9/2015    Inguinal mass     last assessed: 1/21/2015    Myocardial infarction [facility])     Nontraumatic rupture of tendons of biceps (long head), left     last assessed: 1/2/2017    Right trigger finger     last assessed: 1/13/2015    Skin lesion     last assessed: 4/21/2016    Skin lesion of cheek     last assessed: 2/25/2016       Past Surgical History:   Procedure Laterality Date    CAROTID STENT      transcath placement of carotid artery stent    COLONOSCOPY      CORONARY ANGIOPLASTY      TX COLONOSCOPY FLX DX W/COLLJ SPEC WHEN PFRMD N/A 11/3/2017    Procedure: COLONOSCOPY;  Surgeon: Ava Stearns MD;  [facility]: [facility]; Service: Colorectal    ROTATOR CUFF REPAIR Bilateral     SHOULDER SURGERY         Current Outpatient Medications   Medication Sig Dispense Refill    aspirin (ECOTRIN LOW STRENGTH) 81 mg EC tablet Take 81 mg by mouth daily      atenolol (TENORMIN) 25 mg tablet Take 1 tablet (25 mg total) by mouth daily 90 tablet 1    Coenzyme Q10 200 MG capsule Take 200 mg by mouth daily       ezetimibe-simvastatin (VYTORIN) 10-20 mg per tablet Take 1 tablet by mouth daily 90 tablet 3    ibuprofen (MOTRIN) 800 mg tablet TAKE 1 TABLET BY MOUTH EVERY 8 HOURS WITH FOOD AS NEEDED  0    methocarbamol (ROBAXIN) 500 mg tablet Take 1 tablet (500 mg total) by mouth daily at bedtime (Patient not taking: Reported on 10/29/2020) 20 tablet 0    Multiple Vitamins-Minerals (ONE-A-DAY 50 PLUS PO) Take 1 tablet by mouth daily      omega-3-acid ethyl esters (LOVAZA) 1 g capsule TAKE 2 CAPSULES (2 G TOTAL) BY MOUTH 2 (TWO) TIMES A  capsule 3    pantoprazole (PROTONIX) 40 mg tablet TAKE 1 TABLET BY MOUTH EVERY DAY 90 tablet 1    PARoxetine (PAXIL) 10 mg tablet Take 10 mg by mouth daily      sildenafil (VIAGRA) 100 mg tablet Take 1 tablet (100 mg total) by mouth daily as needed for erectile dysfunction 6 tablet 5    valsartan-hydrochlorothiazide (DIOVAN-HCT) 160-12 5 MG per tablet TAKE 1 TABLET BY MOUTH EVERY DAY 90 tablet 1     No current facility-administered medications for this visit  Allergies   Allergen Reactions    Atorvastatin GI Intolerance       Review of Systems   Constitutional: Positive for appetite change, chills, fatigue and fever  Negative for unexpected weight change  HENT: Positive for congestion, rhinorrhea and sore throat   Negative for postnasal drip  Respiratory: Positive for cough and shortness of breath  Cardiovascular: Negative for chest pain  Gastrointestinal: Negative for abdominal pain, constipation and diarrhea  Musculoskeletal: Positive for arthralgias (shoulder pain)  There were no vitals filed for this visit  It was my intent to perform this visit via video technology but the patient was not able to do a video connection so the visit was completed via audio telephone only  I spent 10 minutes directly with the patient during this visit    VIRTUAL VISIT [address] acknowledges that he has consented to an online visit or consultation  He understands that the online visit is based solely on information provided by him, and that, in the absence of a face-to-face physical evaluation by the physician, the diagnosis he receives is both limited and provisional in terms of accuracy and completeness  This is not intended to replace a full medical face-to-face evaluation by the physician  Lydia Sylvester understands and accepts these terms

## 2021-06-25 ENCOUNTER — APPOINTMENT (OUTPATIENT)
Dept: LAB | Facility: CLINIC | Age: 76
End: 2021-06-25
Payer: COMMERCIAL

## 2021-06-25 ENCOUNTER — TELEPHONE (OUTPATIENT)
Dept: FAMILY MEDICINE CLINIC | Facility: CLINIC | Age: 76
End: 2021-06-25

## 2021-06-25 DIAGNOSIS — R50.9 FEVER OF UNKNOWN ORIGIN: ICD-10-CM

## 2021-06-25 DIAGNOSIS — Z91.89 AT HIGH RISK FOR TICK BORNE ILLNESS: ICD-10-CM

## 2021-06-25 LAB
ALBUMIN SERPL BCP-MCNC: 3 G/DL (ref 3.5–5)
ALP SERPL-CCNC: 83 U/L (ref 46–116)
ALT SERPL W P-5'-P-CCNC: 24 U/L (ref 12–78)
ANION GAP SERPL CALCULATED.3IONS-SCNC: 5 MMOL/L (ref 4–13)
AST SERPL W P-5'-P-CCNC: 21 U/L (ref 5–45)
BACTERIA UR QL AUTO: NORMAL /HPF
BASOPHILS # BLD AUTO: 0.05 THOUSANDS/ΜL (ref 0–0.1)
BASOPHILS NFR BLD AUTO: 1 % (ref 0–1)
BILIRUB SERPL-MCNC: 1.06 MG/DL (ref 0.2–1)
BILIRUB UR QL STRIP: NEGATIVE
BUN SERPL-MCNC: 12 MG/DL (ref 5–25)
CALCIUM ALBUM COR SERPL-MCNC: 10.3 MG/DL (ref 8.3–10.1)
CALCIUM SERPL-MCNC: 9.5 MG/DL (ref 8.3–10.1)
CHLORIDE SERPL-SCNC: 102 MMOL/L (ref 100–108)
CLARITY UR: ABNORMAL
CO2 SERPL-SCNC: 27 MMOL/L (ref 21–32)
COLOR UR: ABNORMAL
CREAT SERPL-MCNC: 0.9 MG/DL (ref 0.6–1.3)
CRP SERPL QL: 102 MG/L
EOSINOPHIL # BLD AUTO: 0.66 THOUSAND/ΜL (ref 0–0.61)
EOSINOPHIL NFR BLD AUTO: 8 % (ref 0–6)
ERYTHROCYTE [DISTWIDTH] IN BLOOD BY AUTOMATED COUNT: 11.9 % (ref 11.6–15.1)
GFR SERPL CREATININE-BSD FRML MDRD: 83 ML/MIN/1.73SQ M
GLUCOSE P FAST SERPL-MCNC: 136 MG/DL (ref 65–99)
GLUCOSE UR STRIP-MCNC: NEGATIVE MG/DL
HCT VFR BLD AUTO: 40.2 % (ref 36.5–49.3)
HGB BLD-MCNC: 12.9 G/DL (ref 12–17)
HGB UR QL STRIP.AUTO: NEGATIVE
IMM GRANULOCYTES # BLD AUTO: 0.03 THOUSAND/UL (ref 0–0.2)
IMM GRANULOCYTES NFR BLD AUTO: 0 % (ref 0–2)
KETONES UR STRIP-MCNC: ABNORMAL MG/DL
LEUKOCYTE ESTERASE UR QL STRIP: NEGATIVE
LYMPHOCYTES # BLD AUTO: 1.35 THOUSANDS/ΜL (ref 0.6–4.47)
LYMPHOCYTES NFR BLD AUTO: 17 % (ref 14–44)
MCH RBC QN AUTO: 32.7 PG (ref 26.8–34.3)
MCHC RBC AUTO-ENTMCNC: 32.1 G/DL (ref 31.4–37.4)
MCV RBC AUTO: 102 FL (ref 82–98)
MONOCYTES # BLD AUTO: 0.82 THOUSAND/ΜL (ref 0.17–1.22)
MONOCYTES NFR BLD AUTO: 10 % (ref 4–12)
NEUTROPHILS # BLD AUTO: 5.09 THOUSANDS/ΜL (ref 1.85–7.62)
NEUTS SEG NFR BLD AUTO: 64 % (ref 43–75)
NITRITE UR QL STRIP: NEGATIVE
NON-SQ EPI CELLS URNS QL MICRO: NORMAL /HPF
NRBC BLD AUTO-RTO: 0 /100 WBCS
PH UR STRIP.AUTO: 6.5 [PH]
PLATELET # BLD AUTO: 245 THOUSANDS/UL (ref 149–390)
PMV BLD AUTO: 9.3 FL (ref 8.9–12.7)
POTASSIUM SERPL-SCNC: 4.2 MMOL/L (ref 3.5–5.3)
PROT SERPL-MCNC: 8 G/DL (ref 6.4–8.2)
PROT UR STRIP-MCNC: ABNORMAL MG/DL
RBC # BLD AUTO: 3.94 MILLION/UL (ref 3.88–5.62)
RBC #/AREA URNS AUTO: NORMAL /HPF
SODIUM SERPL-SCNC: 134 MMOL/L (ref 136–145)
SP GR UR STRIP.AUTO: 1.02 (ref 1–1.03)
UROBILINOGEN UR QL STRIP.AUTO: 4 E.U./DL
WBC # BLD AUTO: 8 THOUSAND/UL (ref 4.31–10.16)
WBC #/AREA URNS AUTO: NORMAL /HPF

## 2021-06-25 PROCEDURE — 81001 URINALYSIS AUTO W/SCOPE: CPT | Performed by: FAMILY MEDICINE

## 2021-06-25 PROCEDURE — 85025 COMPLETE CBC W/AUTO DIFF WBC: CPT

## 2021-06-25 PROCEDURE — 36415 COLL VENOUS BLD VENIPUNCTURE: CPT

## 2021-06-25 PROCEDURE — 80053 COMPREHEN METABOLIC PANEL: CPT

## 2021-06-25 PROCEDURE — 87040 BLOOD CULTURE FOR BACTERIA: CPT

## 2021-06-25 PROCEDURE — 86140 C-REACTIVE PROTEIN: CPT

## 2021-06-25 PROCEDURE — 86618 LYME DISEASE ANTIBODY: CPT

## 2021-06-25 NOTE — TELEPHONE ENCOUNTER
Called patient to try to review lab results, left message  Only finding thus far is elevated CRP  Normal WBC, otherwise normal CBC, CMP, UA  Explained strict ER precautions for high fevers, worsening shoulder pain, redness of shoulder due to recent shoulder surgery and concern for possible joint infection  Will follow-up on Monday

## 2021-06-25 NOTE — TELEPHONE ENCOUNTER
Spoke with Edy from Dr Fei Carlton office in regards to patient's CRP of 102 and WBC of 8 00 and Dr Katina Medrano concern for a septic joint  Asked if patient should be seen by ortho or ER  Edy put in high priority message to Dr Boy Syed

## 2021-06-26 LAB — B BURGDOR IGG+IGM SER-ACNC: 17

## 2021-06-28 NOTE — TELEPHONE ENCOUNTER
I spoke with Ashlee Michel, at [facility] ([facility] Dr Reagan Barrientos office  She scheduled patient with Dr Florecita Lakhani in the [facility] office at 2:30 this afternoon  She was calling patient to confirm  I faxed all labs to Dr Florecita Lakhani at that location

## 2021-06-30 LAB — BACTERIA BLD CULT: NORMAL

## 2021-07-08 ENCOUNTER — OFFICE VISIT (OUTPATIENT)
Dept: FAMILY MEDICINE CLINIC | Facility: CLINIC | Age: 76
End: 2021-07-08
Payer: COMMERCIAL

## 2021-07-08 VITALS
SYSTOLIC BLOOD PRESSURE: 116 MMHG | BODY MASS INDEX: 32.43 KG/M2 | HEIGHT: 68 IN | WEIGHT: 214 LBS | TEMPERATURE: 97 F | DIASTOLIC BLOOD PRESSURE: 68 MMHG | RESPIRATION RATE: 16 BRPM | HEART RATE: 60 BPM

## 2021-07-08 DIAGNOSIS — K21.9 GERD WITHOUT ESOPHAGITIS: ICD-10-CM

## 2021-07-08 DIAGNOSIS — R73.01 IMPAIRED FASTING GLUCOSE: ICD-10-CM

## 2021-07-08 DIAGNOSIS — E78.2 MIXED HYPERLIPIDEMIA: ICD-10-CM

## 2021-07-08 DIAGNOSIS — I25.10 ATHEROSCLEROSIS OF NATIVE CORONARY ARTERY OF NATIVE HEART WITHOUT ANGINA PECTORIS: ICD-10-CM

## 2021-07-08 DIAGNOSIS — I65.23 BILATERAL CAROTID ARTERY STENOSIS: ICD-10-CM

## 2021-07-08 DIAGNOSIS — Z00.00 WELL ADULT EXAM: Primary | ICD-10-CM

## 2021-07-08 DIAGNOSIS — I10 ESSENTIAL HYPERTENSION: ICD-10-CM

## 2021-07-08 PROBLEM — R35.1 BENIGN PROSTATIC HYPERPLASIA WITH NOCTURIA: Status: ACTIVE | Noted: 2020-06-30

## 2021-07-08 PROBLEM — M75.101 RIGHT ROTATOR CUFF TEAR: Status: ACTIVE | Noted: 2021-04-30

## 2021-07-08 PROBLEM — N40.1 BENIGN PROSTATIC HYPERPLASIA WITH NOCTURIA: Status: ACTIVE | Noted: 2020-06-30

## 2021-07-08 PROCEDURE — 3288F FALL RISK ASSESSMENT DOCD: CPT | Performed by: FAMILY MEDICINE

## 2021-07-08 PROCEDURE — 3078F DIAST BP <80 MM HG: CPT | Performed by: FAMILY MEDICINE

## 2021-07-08 PROCEDURE — 3074F SYST BP LT 130 MM HG: CPT | Performed by: FAMILY MEDICINE

## 2021-07-08 PROCEDURE — 1160F RVW MEDS BY RX/DR IN RCRD: CPT | Performed by: FAMILY MEDICINE

## 2021-07-08 PROCEDURE — 1036F TOBACCO NON-USER: CPT | Performed by: FAMILY MEDICINE

## 2021-07-08 PROCEDURE — 99397 PER PM REEVAL EST PAT 65+ YR: CPT | Performed by: FAMILY MEDICINE

## 2021-07-08 PROCEDURE — 1101F PT FALLS ASSESS-DOCD LE1/YR: CPT | Performed by: FAMILY MEDICINE

## 2021-07-08 PROCEDURE — 3725F SCREEN DEPRESSION PERFORMED: CPT | Performed by: FAMILY MEDICINE

## 2021-07-08 RX ORDER — TAMSULOSIN HYDROCHLORIDE 0.4 MG/1
0.4 CAPSULE ORAL DAILY
COMMUNITY
Start: 2021-06-29

## 2021-07-08 RX ORDER — ROSUVASTATIN CALCIUM 10 MG/1
10 TABLET, COATED ORAL DAILY
Qty: 30 TABLET | Refills: 5 | Status: SHIPPED | OUTPATIENT
Start: 2021-07-08 | End: 2022-07-12 | Stop reason: ALTCHOICE

## 2021-07-08 NOTE — PROGRESS NOTES
Assessment/Plan:         Diagnoses and all orders for this visit:    Well adult exam    Essential hypertension    Mixed hyperlipidemia  -     rosuvastatin (CRESTOR) 10 MG tablet; Take 1 tablet (10 mg total) by mouth daily  -     AST; Future  -     ALT; Future  -     Lipid panel    Impaired fasting glucose  -     Hemoglobin A1C    Atherosclerosis of native coronary artery of native heart without angina pectoris    Bilateral carotid artery stenosis    GERD without esophagitis    Other orders  -     tamsulosin (FLOMAX) 0 4 mg; Take 0 4 mg by mouth daily          Start generic Crestor 10 mg daily  Repeat AST/ALT and lipid profile in 6 weeks  I included an A1c  Follow-up with Orthopedic surgery regarding right shoulder    BMI Counseling: Body mass index is 32 54 kg/m²  The BMI is above normal  Nutrition recommendations include reducing portion sizes, decreasing overall calorie intake, consuming healthier snacks, moderation in carbohydrate intake, reducing intake of saturated fat and trans fat and reducing intake of cholesterol  Exercise recommendations include exercising 3-5 times per week  Patient ID: Kaia Barnett is a 68 y o  male  68year old male here for Wellness exam  Medications reviewed  Hospitalizations/surgeries coronary artery angioplasty/stents, bilateral rotator cuff surgery  Family history CAD mother  SH  Former smoker quit [address] 06/2021 see note  Hypertension blood pressures have been stable on Atenolol 25 mg daily and Valsartan HCTZ 160/12 5 daily  Creatinine 0 90  Electrolytes normal except for Na+ 134  Hgb 12 9  Hyperlipidemia mixed type and CAD no longer on Vytorin 10/20 daily  Prior intolerance to Atorvastatin  He remains on generic Lovaza 1 gm 2 capsules BID  Last lipid profile 06/2020 cholesterol 114  Triglycerides 136  HDL 51  LDL 36  06/2021 LFTs normal except for total bilirubin 1 06  Impaired fasting glucose  increased from 110  06/2020 A1c 5 6        Lab Results Component Value Date    WBC 8 00 06/25/2021    HGB 12 9 06/25/2021    HCT 40 2 06/25/2021     (H) 06/25/2021     06/25/2021     Lab Results   Component Value Date     12/03/2015    SODIUM 134 (L) 06/25/2021    K 4 2 06/25/2021     06/25/2021    CO2 27 06/25/2021    ANIONGAP 7 12/03/2015    AGAP 5 06/25/2021    BUN 12 06/25/2021    CREATININE 0 90 06/25/2021    GLUC 103 09/01/2016    GLUF 136 (H) 06/25/2021    CALCIUM 9 5 06/25/2021    AST 21 06/25/2021    ALT 24 06/25/2021    ALKPHOS 83 06/25/2021    PROT 7 1 12/03/2015    TP 8 0 06/25/2021    BILITOT 0 84 12/03/2015    TBILI 1 06 (H) 06/25/2021    EGFR 83 06/25/2021     Lab Results   Component Value Date    CHOLESTEROL 114 06/16/2020    CHOLESTEROL 119 09/06/2019    CHOLESTEROL 120 01/30/2019     Lab Results   Component Value Date    HDL 51 06/16/2020    HDL 49 09/06/2019    HDL 54 01/30/2019     Lab Results   Component Value Date    TRIG 136 06/16/2020    TRIG 121 09/06/2019    TRIG 100 01/30/2019     Lab Results   Component Value Date    LDLCALC 36 06/16/2020     Lab Results   Component Value Date    HGBA1C 5 6 06/16/2020         The following portions of the patient's history were reviewed and updated as appropriate: allergies, current medications, past family history, past medical history, past social history, past surgical history and problem list     Review of Systems   Constitutional: Negative for appetite change, chills, fever and unexpected weight change  HENT: Negative for congestion, ear pain, rhinorrhea, sore throat and trouble swallowing  Eyes: Negative for visual disturbance  Respiratory: Negative for cough, shortness of breath and wheezing  12/2014 CT scan chest normal   Cardiovascular: Negative for chest pain, palpitations and leg swelling  See HPI  CAD s/p inferior MI  2 stents RCA  02/2021 nuclear stress test no chest pain during stress  Stress EKG negative for ischemia    Perfusion imaging no perfusion defects  Calculated left ventricular ejection fraction 60%  No left ventricular regional abnormality  01/2021 echocardiogram normal left ventricular systolic function  EF 60%  No regional wall motion abnormalities  Left ventricular diastolic function normal   Trace MR  Trace TR  Pulmonary artery systolic pressure within normal range  No pericardial effusion  Aortic root normal size  11/2016 carotid artery dopplers ICAs < 50% stenosis bilaterally    Gastrointestinal: Negative for abdominal pain, blood in stool, constipation, diarrhea, nausea and vomiting  GERD stable on Protonix 40 mg daily  No reflux  no dysphagia  Colonoscopy 11/2017   Endocrine: Negative for polydipsia and polyuria  Genitourinary: Negative for difficulty urinating  01/2020 renal ultrasound normal no hydronephrosis  No significant PVR    Lab Results       Component                Value               Date                       PSA                      0 4                 02/25/2021                 PSA                      0 4                 01/17/2020                 PSA                      0 6                 04/18/2018                         Musculoskeletal: Positive for arthralgias  Negative for myalgias  S/p repeat right shoulder rotator surgery 04/2021  Patient was re evaluated last month by Ortho due swelling right shoulder with fevers  Patient was started on oral antibiotics-Doxycycline  Right subacromial space aspirated cell count reviewed  No culture done? Clinically symptoms have improved   Skin: Negative for rash  Allergic/Immunologic: Negative for environmental allergies  Neurological: Negative for dizziness, weakness and headaches  Hematological: Negative for adenopathy  Does not bruise/bleed easily  History of mild thrombocytopenia        Lab Results       Component                Value               Date                       WBC                      8 00 06/25/2021                 HGB                      12 9                06/25/2021                 HCT                      40 2                06/25/2021                 MCV                      102 (H)             06/25/2021                 PLT                      245                 06/25/2021              Hemoglobin       Date                     Value               Ref Range           Status                06/25/2021               12 9                12 0 - 17 0 g/*     Final                 06/16/2020               13 8                12 0 - 17 0 g/*     Final                 01/30/2019               14 2                12 0 - 17 0 g/*     Final                 12/03/2015               14 3                12 0 - 17 0 g/*     Final                 07/16/2015               14 9                12 0 - 17 0 g/*     Final                 03/12/2015               13 9                12 0 - 17 0 g/*     Final                         Platelets       Date                     Value               Ref Range           Status                06/16/2020               150                 149 - 390 Thou*     Final                 01/30/2019               159                 149 - 390 Thou*     Final                 08/24/2018               144 (L)             149 - 390 Thou*     Final                 12/03/2015               145 (L)             149 - 390 Thou*     Final                 07/16/2015               144 (L)             149 - 390 Thou*     Final                 03/12/2015               159                 149 - 390 Thou*     Final                 Psychiatric/Behavioral: Positive for dysphoric mood  Negative for sleep disturbance          Stable on Paxil 10 mg daily          Objective:    /68   Pulse 60   Temp (!) 97 °F (36 1 °C)   Resp 16   Ht 5' 8" (1 727 m)   Wt 97 1 kg (214 lb)   BMI 32 54 kg/m²     BP Readings from Last 3 Encounters:   07/08/21 116/68   10/29/20 120/60   09/01/20 116/78     Wt Readings from Last 3 Encounters:   07/08/21 97 1 kg (214 lb)   10/29/20 97 5 kg (215 lb)   09/01/20 97 1 kg (214 lb)        Physical Exam  Vitals and nursing note reviewed  Constitutional:       General: He is not in acute distress  Appearance: He is well-developed  HENT:      Right Ear: Tympanic membrane normal       Left Ear: Tympanic membrane normal    Eyes:      General: No scleral icterus  Extraocular Movements: Extraocular movements intact  Conjunctiva/sclera: Conjunctivae normal       Pupils: Pupils are equal, round, and reactive to light  Neck:      Thyroid: No thyroid mass or thyromegaly  Vascular: No carotid bruit or JVD  Trachea: No tracheal deviation  Cardiovascular:      Rate and Rhythm: Normal rate and regular rhythm  Pulses:           Carotid pulses are 2+ on the right side and 2+ on the left side  Heart sounds: Normal heart sounds  No murmur heard  No gallop  Pulmonary:      Effort: Pulmonary effort is normal  No respiratory distress  Breath sounds: Normal breath sounds  No wheezing or rales  Abdominal:      General: Bowel sounds are normal  There is no distension or abdominal bruit  Palpations: Abdomen is soft  There is no hepatomegaly, splenomegaly or mass  Tenderness: There is no abdominal tenderness  There is no guarding or rebound  Musculoskeletal:         General: No swelling or tenderness  Normal range of motion  Right lower leg: No edema  Left lower leg: No edema  Comments: Inspection of right shoulder normal no swelling  No redness or warmth  ROM normal     Lymphadenopathy:      Cervical: No cervical adenopathy  Upper Body:      Right upper body: No supraclavicular adenopathy  Left upper body: No supraclavicular adenopathy  Skin:     Findings: No rash  Nails: There is no clubbing  Neurological:      General: No focal deficit present        Mental Status: He is alert and oriented to person, place, and time    Psychiatric:         Mood and Affect: Mood normal          Behavior: Behavior normal          Cognition and Memory: Cognition normal

## 2021-07-09 RX ORDER — LOSARTAN POTASSIUM AND HYDROCHLOROTHIAZIDE 12.5; 5 MG/1; MG/1
1 TABLET ORAL EVERY MORNING
COMMUNITY

## 2021-07-09 RX ORDER — PANTOPRAZOLE SODIUM 20 MG/1
20 TABLET, DELAYED RELEASE ORAL DAILY
COMMUNITY

## 2021-07-09 RX ORDER — SIMVASTATIN 40 MG
20 TABLET ORAL
COMMUNITY

## 2021-07-14 RX ORDER — CELECOXIB 200 MG/1
200 CAPSULE ORAL 2 TIMES DAILY
COMMUNITY
Start: 2021-07-14 | End: 2022-07-12 | Stop reason: ALTCHOICE

## 2021-08-17 DIAGNOSIS — E78.2 MIXED HYPERLIPIDEMIA: Primary | ICD-10-CM

## 2021-08-26 ENCOUNTER — APPOINTMENT (OUTPATIENT)
Dept: LAB | Facility: CLINIC | Age: 76
End: 2021-08-26
Payer: COMMERCIAL

## 2021-08-26 DIAGNOSIS — E78.2 MIXED HYPERLIPIDEMIA: ICD-10-CM

## 2021-08-26 LAB
ALT SERPL W P-5'-P-CCNC: 35 U/L (ref 12–78)
AST SERPL W P-5'-P-CCNC: 32 U/L (ref 5–45)
CHOLEST SERPL-MCNC: 113 MG/DL (ref 50–200)
EST. AVERAGE GLUCOSE BLD GHB EST-MCNC: 114 MG/DL
HBA1C MFR BLD: 5.6 %
HDLC SERPL-MCNC: 44 MG/DL
LDLC SERPL CALC-MCNC: 35 MG/DL (ref 0–100)
TRIGL SERPL-MCNC: 172 MG/DL

## 2021-08-26 PROCEDURE — 83036 HEMOGLOBIN GLYCOSYLATED A1C: CPT | Performed by: FAMILY MEDICINE

## 2021-08-26 PROCEDURE — 84450 TRANSFERASE (AST) (SGOT): CPT

## 2021-08-26 PROCEDURE — 80061 LIPID PANEL: CPT

## 2021-08-26 PROCEDURE — 36415 COLL VENOUS BLD VENIPUNCTURE: CPT | Performed by: FAMILY MEDICINE

## 2021-08-26 PROCEDURE — 84460 ALANINE AMINO (ALT) (SGPT): CPT

## 2021-10-05 ENCOUNTER — OFFICE VISIT (OUTPATIENT)
Dept: CARDIOLOGY CLINIC | Facility: CLINIC | Age: 76
End: 2021-10-05
Payer: COMMERCIAL

## 2021-10-05 VITALS
OXYGEN SATURATION: 96 % | WEIGHT: 214.4 LBS | SYSTOLIC BLOOD PRESSURE: 130 MMHG | BODY MASS INDEX: 32.49 KG/M2 | HEIGHT: 68 IN | HEART RATE: 64 BPM | DIASTOLIC BLOOD PRESSURE: 62 MMHG

## 2021-10-05 DIAGNOSIS — E78.00 PURE HYPERCHOLESTEROLEMIA: ICD-10-CM

## 2021-10-05 DIAGNOSIS — I10 ESSENTIAL HYPERTENSION: Primary | ICD-10-CM

## 2021-10-05 DIAGNOSIS — I25.10 CORONARY ARTERIOSCLEROSIS: ICD-10-CM

## 2021-10-05 PROCEDURE — 99214 OFFICE O/P EST MOD 30 MIN: CPT | Performed by: INTERNAL MEDICINE

## 2021-10-05 PROCEDURE — 1160F RVW MEDS BY RX/DR IN RCRD: CPT | Performed by: INTERNAL MEDICINE

## 2021-12-14 ENCOUNTER — OFFICE VISIT (OUTPATIENT)
Dept: FAMILY MEDICINE CLINIC | Facility: CLINIC | Age: 76
End: 2021-12-14
Payer: COMMERCIAL

## 2021-12-14 VITALS
OXYGEN SATURATION: 96 % | SYSTOLIC BLOOD PRESSURE: 128 MMHG | HEIGHT: 68 IN | DIASTOLIC BLOOD PRESSURE: 72 MMHG | BODY MASS INDEX: 32.6 KG/M2 | HEART RATE: 60 BPM | TEMPERATURE: 98.4 F

## 2021-12-14 DIAGNOSIS — R05.3 CHRONIC COUGH: Primary | ICD-10-CM

## 2021-12-14 PROCEDURE — 99213 OFFICE O/P EST LOW 20 MIN: CPT | Performed by: PHYSICIAN ASSISTANT

## 2022-03-15 ENCOUNTER — APPOINTMENT (OUTPATIENT)
Dept: LAB | Facility: CLINIC | Age: 77
End: 2022-03-15
Payer: COMMERCIAL

## 2022-03-15 DIAGNOSIS — I25.10 CORONARY ARTERIOSCLEROSIS: ICD-10-CM

## 2022-03-15 DIAGNOSIS — I10 ESSENTIAL HYPERTENSION: ICD-10-CM

## 2022-03-15 DIAGNOSIS — E78.00 PURE HYPERCHOLESTEROLEMIA: ICD-10-CM

## 2022-03-15 LAB
ALBUMIN SERPL BCP-MCNC: 3.8 G/DL (ref 3.5–5)
ALP SERPL-CCNC: 82 U/L (ref 46–116)
ALT SERPL W P-5'-P-CCNC: 37 U/L (ref 12–78)
AST SERPL W P-5'-P-CCNC: 37 U/L (ref 5–45)
BILIRUB DIRECT SERPL-MCNC: 0.21 MG/DL (ref 0–0.2)
BILIRUB SERPL-MCNC: 0.85 MG/DL (ref 0.2–1)
CHOLEST SERPL-MCNC: 117 MG/DL
HDLC SERPL-MCNC: 46 MG/DL
LDLC SERPL CALC-MCNC: 36 MG/DL (ref 0–100)
LDLC SERPL DIRECT ASSAY-MCNC: 50 MG/DL (ref 0–100)
NONHDLC SERPL-MCNC: 71 MG/DL
PROT SERPL-MCNC: 7.3 G/DL (ref 6.4–8.2)
TRIGL SERPL-MCNC: 175 MG/DL

## 2022-03-15 PROCEDURE — 80061 LIPID PANEL: CPT

## 2022-03-15 PROCEDURE — 83721 ASSAY OF BLOOD LIPOPROTEIN: CPT

## 2022-03-15 PROCEDURE — 80076 HEPATIC FUNCTION PANEL: CPT

## 2022-03-15 PROCEDURE — 36415 COLL VENOUS BLD VENIPUNCTURE: CPT

## 2022-05-11 ENCOUNTER — TELEPHONE (OUTPATIENT)
Dept: FAMILY MEDICINE CLINIC | Facility: CLINIC | Age: 77
End: 2022-05-11

## 2022-05-11 ENCOUNTER — HOSPITAL ENCOUNTER (EMERGENCY)
Facility: HOSPITAL | Age: 77
Discharge: HOME | End: 2022-05-11
Attending: EMERGENCY MEDICINE
Payer: COMMERCIAL

## 2022-05-11 ENCOUNTER — APPOINTMENT (EMERGENCY)
Dept: RADIOLOGY | Facility: HOSPITAL | Age: 77
End: 2022-05-11
Payer: COMMERCIAL

## 2022-05-11 VITALS
DIASTOLIC BLOOD PRESSURE: 72 MMHG | TEMPERATURE: 99.1 F | SYSTOLIC BLOOD PRESSURE: 150 MMHG | HEIGHT: 68 IN | HEART RATE: 56 BPM | OXYGEN SATURATION: 97 % | RESPIRATION RATE: 16 BRPM | BODY MASS INDEX: 32.6 KG/M2

## 2022-05-11 DIAGNOSIS — R55 NEAR SYNCOPE: Primary | ICD-10-CM

## 2022-05-11 LAB
ALBUMIN SERPL BCP-MCNC: 3.6 G/DL (ref 3.5–5)
ALP SERPL-CCNC: 74 U/L (ref 34–104)
ALT SERPL W P-5'-P-CCNC: 22 U/L (ref 7–52)
ANION GAP SERPL CALCULATED.3IONS-SCNC: 5 MMOL/L (ref 4–13)
AST SERPL W P-5'-P-CCNC: 22 U/L (ref 13–39)
ATRIAL RATE: 71 BPM
BASOPHILS # BLD AUTO: 0.03 THOUSANDS/ΜL (ref 0–0.1)
BASOPHILS NFR BLD AUTO: 1 % (ref 0–1)
BILIRUB SERPL-MCNC: 1.1 MG/DL (ref 0.2–1)
BILIRUB UR QL STRIP: NEGATIVE
BUN SERPL-MCNC: 20 MG/DL (ref 5–25)
CALCIUM SERPL-MCNC: 8.9 MG/DL (ref 8.4–10.2)
CHLORIDE SERPL-SCNC: 103 MMOL/L (ref 96–108)
CLARITY UR: CLEAR
CO2 SERPL-SCNC: 28 MMOL/L (ref 21–32)
COLOR UR: YELLOW
CREAT SERPL-MCNC: 0.8 MG/DL (ref 0.6–1.3)
EOSINOPHIL # BLD AUTO: 0.35 THOUSAND/ΜL (ref 0–0.61)
EOSINOPHIL NFR BLD AUTO: 6 % (ref 0–6)
ERYTHROCYTE [DISTWIDTH] IN BLOOD BY AUTOMATED COUNT: 12.1 % (ref 11.6–15.1)
FLUAV RNA RESP QL NAA+PROBE: NEGATIVE
FLUBV RNA RESP QL NAA+PROBE: NEGATIVE
GFR SERPL CREATININE-BSD FRML MDRD: 86 ML/MIN/1.73SQ M
GLUCOSE SERPL-MCNC: 142 MG/DL (ref 65–140)
GLUCOSE UR STRIP-MCNC: NEGATIVE MG/DL
HCT VFR BLD AUTO: 38.6 % (ref 36.5–49.3)
HGB BLD-MCNC: 13.1 G/DL (ref 12–17)
HGB UR QL STRIP.AUTO: NEGATIVE
IMM GRANULOCYTES # BLD AUTO: 0.02 THOUSAND/UL (ref 0–0.2)
IMM GRANULOCYTES NFR BLD AUTO: 0 % (ref 0–2)
KETONES UR STRIP-MCNC: NEGATIVE MG/DL
LEUKOCYTE ESTERASE UR QL STRIP: NEGATIVE
LIPASE SERPL-CCNC: 52 U/L (ref 11–82)
LYMPHOCYTES # BLD AUTO: 1.27 THOUSANDS/ΜL (ref 0.6–4.47)
LYMPHOCYTES NFR BLD AUTO: 20 % (ref 14–44)
MAGNESIUM SERPL-MCNC: 2.1 MG/DL (ref 1.9–2.7)
MCH RBC QN AUTO: 34.5 PG (ref 26.8–34.3)
MCHC RBC AUTO-ENTMCNC: 33.9 G/DL (ref 31.4–37.4)
MCV RBC AUTO: 102 FL (ref 82–98)
MONOCYTES # BLD AUTO: 0.67 THOUSAND/ΜL (ref 0.17–1.22)
MONOCYTES NFR BLD AUTO: 11 % (ref 4–12)
NEUTROPHILS # BLD AUTO: 3.96 THOUSANDS/ΜL (ref 1.85–7.62)
NEUTS SEG NFR BLD AUTO: 62 % (ref 43–75)
NITRITE UR QL STRIP: NEGATIVE
NRBC BLD AUTO-RTO: 0 /100 WBCS
P AXIS: 15 DEGREES
PH UR STRIP.AUTO: 6.5 [PH] (ref 4.5–8)
PLATELET # BLD AUTO: 155 THOUSANDS/UL (ref 149–390)
PMV BLD AUTO: 8.8 FL (ref 8.9–12.7)
POTASSIUM SERPL-SCNC: 4.2 MMOL/L (ref 3.5–5.3)
PR INTERVAL: 202 MS
PROT SERPL-MCNC: 6.3 G/DL (ref 6.4–8.4)
PROT UR STRIP-MCNC: NEGATIVE MG/DL
QRS AXIS: 29 DEGREES
QRSD INTERVAL: 90 MS
QT INTERVAL: 380 MS
QTC INTERVAL: 405 MS
RBC # BLD AUTO: 3.8 MILLION/UL (ref 3.88–5.62)
RSV RNA RESP QL NAA+PROBE: NEGATIVE
SARS-COV-2 RNA RESP QL NAA+PROBE: NEGATIVE
SODIUM SERPL-SCNC: 136 MMOL/L (ref 135–147)
SP GR UR STRIP.AUTO: 1.01 (ref 1–1.03)
T WAVE AXIS: 65 DEGREES
UROBILINOGEN UR QL STRIP.AUTO: 2 E.U./DL
VENTRICULAR RATE: 68 BPM
WBC # BLD AUTO: 6.3 THOUSAND/UL (ref 4.31–10.16)

## 2022-05-11 PROCEDURE — 96361 HYDRATE IV INFUSION ADD-ON: CPT

## 2022-05-11 PROCEDURE — 96360 HYDRATION IV INFUSION INIT: CPT

## 2022-05-11 PROCEDURE — 93010 ELECTROCARDIOGRAM REPORT: CPT | Performed by: INTERNAL MEDICINE

## 2022-05-11 PROCEDURE — 99284 EMERGENCY DEPT VISIT MOD MDM: CPT | Performed by: EMERGENCY MEDICINE

## 2022-05-11 PROCEDURE — 83690 ASSAY OF LIPASE: CPT | Performed by: EMERGENCY MEDICINE

## 2022-05-11 PROCEDURE — 80053 COMPREHEN METABOLIC PANEL: CPT | Performed by: EMERGENCY MEDICINE

## 2022-05-11 PROCEDURE — 85025 COMPLETE CBC W/AUTO DIFF WBC: CPT | Performed by: EMERGENCY MEDICINE

## 2022-05-11 PROCEDURE — 71045 X-RAY EXAM CHEST 1 VIEW: CPT

## 2022-05-11 PROCEDURE — 83735 ASSAY OF MAGNESIUM: CPT | Performed by: EMERGENCY MEDICINE

## 2022-05-11 PROCEDURE — 0241U HB NFCT DS VIR RESP RNA 4 TRGT: CPT | Performed by: EMERGENCY MEDICINE

## 2022-05-11 PROCEDURE — 81003 URINALYSIS AUTO W/O SCOPE: CPT

## 2022-05-11 PROCEDURE — 93005 ELECTROCARDIOGRAM TRACING: CPT

## 2022-05-11 PROCEDURE — 99284 EMERGENCY DEPT VISIT MOD MDM: CPT

## 2022-05-11 PROCEDURE — 36415 COLL VENOUS BLD VENIPUNCTURE: CPT | Performed by: EMERGENCY MEDICINE

## 2022-05-11 RX ADMIN — SODIUM CHLORIDE 500 ML: 0.9 INJECTION, SOLUTION INTRAVENOUS at 13:44

## 2022-05-11 RX ADMIN — SODIUM CHLORIDE 500 ML: 0.9 INJECTION, SOLUTION INTRAVENOUS at 15:11

## 2022-05-11 NOTE — ED PROVIDER NOTES
History  Chief Complaint   Patient presents with    Syncope     States increased dizziness since last night, syncopal episode last night while getting out of car, fell to the ground, witnessed by wife, denies head strike, takes baby asa, states dizziness comes and goes, slight blurred vision when it comes on, denies FERREIRA     HPI    59-year-old male presents after near syncopal episode yesterday  Patient states he is getting in the car, stood up, felt lightheaded, fell to the ground, witnessed by wife, no head strike  Patient describes ongoing intermittent dizziness, slight blurred vision when it happens  Denies any headache  Denies any chest pain, abdominal pain  Denies any significant trauma  Denies any medications  Denies any fever, chills, cough  Reports frequent urination at night for many years, no increase  Prior to Admission Medications   Prescriptions Last Dose Informant Patient Reported? Taking?    Coenzyme Q10 200 MG capsule  Self Yes No   Sig: Take 400 mg by mouth daily    Multiple Vitamins-Minerals (ONE-A-DAY 50 PLUS PO)  Self Yes No   Sig: Take 1 tablet by mouth daily   PARoxetine (PAXIL) 10 mg tablet  Self Yes No   Sig: Take 10 mg by mouth daily   aspirin (ECOTRIN LOW STRENGTH) 81 mg EC tablet  Self Yes No   Sig: Take 81 mg by mouth daily   atenolol (TENORMIN) 25 mg tablet  Self No No   Sig: Take 1 tablet (25 mg total) by mouth daily   celecoxib (CeleBREX) 200 mg capsule   Yes No   Sig: Take 200 mg by mouth 2 (two) times a day   Patient not taking: Reported on 10/5/2021   ibuprofen (MOTRIN) 800 mg tablet  Self Yes No   Sig: TAKE 1 TABLET BY MOUTH EVERY 8 HOURS WITH FOOD AS NEEDED   Patient not taking: Reported on 7/9/2021   losartan-hydrochlorothiazide (HYZAAR) 50-12 5 mg per tablet  Self Yes No   Sig: Take 1 tablet by mouth every morning   Patient not taking: Reported on 10/5/2021   omega-3-acid ethyl esters (LOVAZA) 1 g capsule  Self No No   Sig: TAKE 2 CAPSULES (2 G TOTAL) BY MOUTH 2 (TWO) TIMES A DAY   pantoprazole (PROTONIX) 20 mg tablet  Self Yes No   Sig: Take 20 mg by mouth daily Patient cutting in half, taking 10 mg daily  pantoprazole (PROTONIX) 40 mg tablet  Self No No   Sig: TAKE 1 TABLET BY MOUTH EVERY DAY   Patient not taking: Reported on 10/5/2021   rosuvastatin (CRESTOR) 10 MG tablet   No No   Sig: Take 1 tablet (10 mg total) by mouth daily   Patient not taking: Reported on 7/9/2021   sildenafil (VIAGRA) 100 mg tablet  Self No No   Sig: Take 1 tablet (100 mg total) by mouth daily as needed for erectile dysfunction   Patient not taking: Reported on 7/9/2021   simvastatin (ZOCOR) 40 mg tablet  Self Yes No   Sig: Take 20 mg by mouth daily at bedtime Patient taking 20 mg, half of a 40 mg tablet     tamsulosin (FLOMAX) 0 4 mg  Self Yes No   Sig: Take 0 4 mg by mouth daily   valsartan-hydrochlorothiazide (DIOVAN-HCT) 160-12 5 MG per tablet  Self No No   Sig: TAKE 1 TABLET BY MOUTH EVERY DAY      Facility-Administered Medications: None       Past Medical History:   Diagnosis Date    Abnormal electrocardiography     resolved: 11/21/2017    Abnormal laboratory test     last assessed: 11/16/2016    Acute myocardial infarction of inferior wall ([facility] [address] ) 1/20/2015    Anxiety     Community acquired pneumonia     last assessed: 10/15/2015    Complete tear of left rotator cuff     unspecified laterality    Coronary artery disease     Depression     GERD (gastroesophageal reflux disease)     Hyperlipidemia     Hypertension     Impingement syndrome of left shoulder 3/9/2015    Inguinal mass     last assessed: 1/21/2015    Myocardial infarction ([facility] [address] )     Nontraumatic rupture of tendons of biceps (long head), left     last assessed: 1/2/2017    Right trigger finger     last assessed: 1/13/2015    Skin lesion     last assessed: 4/21/2016    Skin lesion of cheek     last assessed: 2/25/2016    Thrombocytopenia ([facility] [address] ) 1/13/2015       Past Surgical History:   Procedure Laterality Date    CAROTID STENT      transcath placement of carotid artery stent    COLONOSCOPY      CORONARY ANGIOPLASTY      FL COLONOSCOPY FLX DX W/COLLJ SPEC WHEN PFRMD N/A 11/3/2017    Procedure: COLONOSCOPY;  Surgeon: Alexei Song MD;  [facility]; Service: Colorectal    ROTATOR CUFF REPAIR Bilateral     SHOULDER SURGERY         Family History   Problem Relation Age of Onset    Other Mother         brain tumor    Coronary artery disease Mother     No Known Problems Father      I have reviewed and agree with the history as documented  E-Cigarette/Vaping     E-Cigarette/Vaping Substances     Social History     Tobacco Use    Smoking status: Former Smoker     Packs/day: 1 00     Years: 20 00     Pack years: 20 00     Quit date:      Years since quittin 3    Smokeless tobacco: Never Used   Substance Use Topics    Alcohol use: Yes    Drug use: No       Review of Systems   Neurological: Positive for dizziness  All other systems reviewed and are negative  Physical Exam  Physical Exam  Vitals and nursing note reviewed  Constitutional:       General: He is not in acute distress  Appearance: He is well-developed  He is not diaphoretic  HENT:      Head: Normocephalic and atraumatic  Right Ear: External ear normal       Left Ear: External ear normal    Eyes:      General:         Right eye: No discharge  Left eye: No discharge  Pupils: Pupils are equal, round, and reactive to light  Neck:      Thyroid: No thyromegaly  Trachea: No tracheal deviation  Cardiovascular:      Rate and Rhythm: Normal rate and regular rhythm  Heart sounds: No murmur heard  Pulmonary:      Effort: Pulmonary effort is normal       Breath sounds: Normal breath sounds  Abdominal:      General: Bowel sounds are normal  There is no distension  Palpations: Abdomen is soft  Tenderness: There is no abdominal tenderness  Musculoskeletal:         General: No deformity   Normal range of motion  Cervical back: Normal range of motion and neck supple  Skin:     General: Skin is warm  Capillary Refill: Capillary refill takes less than 2 seconds  Neurological:      Mental Status: He is alert and oriented to person, place, and time  Cranial Nerves: No cranial nerve deficit  Motor: No abnormal muscle tone     Psychiatric:         Behavior: Behavior normal          Vital Signs  ED Triage Vitals [05/11/22 1257]   Temperature Pulse Respirations Blood Pressure SpO2   99 1 °F (37 3 °C) 75 16 121/62 96 %      Temp Source Heart Rate Source Patient Position - Orthostatic VS BP Location FiO2 (%)   Oral Monitor Sitting Left arm --      Pain Score       No Pain           Vitals:    05/11/22 1257 05/11/22 1512 05/11/22 1513 05/11/22 1514   BP: 121/62 154/71 146/77 157/74   Pulse: 75 60     Patient Position - Orthostatic VS: Sitting Lying Sitting Standing         Visual Acuity  Visual Acuity    Flowsheet Row Most Recent Value   L Pupil Size (mm) 3   R Pupil Size (mm) 3          ED Medications  Medications   sodium chloride 0 9 % bolus 500 mL (500 mL Intravenous New Bag 5/11/22 1344)   sodium chloride 0 9 % bolus 500 mL (500 mL Intravenous New Bag 5/11/22 1511)       Diagnostic Studies  Results Reviewed     Procedure Component Value Units Date/Time    Urine Macroscopic, POC [[id number]]  (Abnormal) Collected: 05/11/22 1516    Lab Status: Final result Specimen: Urine Updated: 05/11/22 1518     Color, UA Yellow     Clarity, UA Clear     pH, UA 6 5     Leukocytes, UA Negative     Nitrite, UA Negative     Protein, UA Negative mg/dl      Glucose, UA Negative mg/dl      Ketones, UA Negative mg/dl      Urobilinogen, UA 2 0 E U /dl      Bilirubin, UA Negative     Blood, UA Negative     Specific Gravity, UA 1 015    Narrative:      CLINITEK RESULT    COVID/FLU/RSV [[id number]]  (Normal) Collected: 05/11/22 1343    Lab Status: Final result Specimen: Nares from Nose Updated: 05/11/22 1449     SARS-CoV-2 Negative     INFLUENZA A PCR Negative     INFLUENZA B PCR Negative     RSV PCR Negative    Narrative:      FOR PEDIATRIC PATIENTS - copy/paste COVID Guidelines URL to browser: [email or web address]  [email or web address]    SARS-CoV-2 assay is a Nucleic Acid Amplification assay intended for the  qualitative detection of nucleic acid from SARS-CoV-2 in nasopharyngeal  swabs  Results are for the presumptive identification of SARS-CoV-2 RNA  Positive results are indicative of infection with SARS-CoV-2, the virus  causing COVID-19, but do not rule out bacterial infection or co-infection  with other viruses  Laboratories within the [place] and its  territories are required to report all positive results to the appropriate  public health authorities  Negative results do not preclude SARS-CoV-2  infection and should not be used as the sole basis for treatment or other  patient management decisions  Negative results must be combined with  clinical observations, patient history, and epidemiological information  This test has not been FDA cleared or approved  This test has been authorized by FDA under an Emergency Use Authorization  (EUA)  This test is only authorized for the duration of time the  declaration that circumstances exist justifying the authorization of the  emergency use of an in vitro diagnostic tests for detection of SARS-CoV-2  virus and/or diagnosis of COVID-19 infection under section 564(b)(1) of  the Act, 21 U  S C  247AEQ-4(T)(8), unless the authorization is terminated  or revoked sooner  The test has been validated but independent review by FDA  and CLIA is pending  Test performed using [employer] GeneXpert: This RT-PCR assay targets N2,  a region unique to SARS-CoV-2  A conserved region in the E-gene was chosen  for pan-Sarbecovirus detection which includes SARS-CoV-2      Lipase [[id number]]  (Normal) Collected: 05/11/22 1343    Lab Status: Final result Specimen: Blood from Arm, Right Updated: 05/11/22 1415     Lipase 52 u/L     Comprehensive metabolic panel [[id number]]  (Abnormal) Collected: 05/11/22 1343    Lab Status: Final result Specimen: Blood from Arm, Right Updated: 05/11/22 1415     Sodium 136 mmol/L      Potassium 4 2 mmol/L      Chloride 103 mmol/L      CO2 28 mmol/L      ANION GAP 5 mmol/L      BUN 20 mg/dL      Creatinine 0 80 mg/dL      Glucose 142 mg/dL      Calcium 8 9 mg/dL      AST 22 U/L      ALT 22 U/L      Alkaline Phosphatase 74 U/L      Total Protein 6 3 g/dL      Albumin 3 6 g/dL      Total Bilirubin 1 10 mg/dL      eGFR 86 ml/min/1 73sq m     Narrative:      National Kidney Disease Foundation guidelines for Chronic Kidney Disease (CKD):     Stage 1 with normal or high GFR (GFR > 90 mL/min/1 73 square meters)    Stage 2 Mild CKD (GFR = 60-89 mL/min/1 73 square meters)    Stage 3A Moderate CKD (GFR = 45-59 mL/min/1 73 square meters)    Stage 3B Moderate CKD (GFR = 30-44 mL/min/1 73 square meters)    Stage 4 Severe CKD (GFR = 15-29 mL/min/1 73 square meters)    Stage 5 End Stage CKD (GFR <15 mL/min/1 73 square meters)  Note: GFR calculation is accurate only with a steady state creatinine    Magnesium [[id number]]  (Normal) Collected: 05/11/22 1343    Lab Status: Final result Specimen: Blood from Arm, Right Updated: 05/11/22 1415     Magnesium 2 1 mg/dL     CBC and differential [[id number]]  (Abnormal) Collected: 05/11/22 1343    Lab Status: Final result Specimen: Blood from Arm, Right Updated: 05/11/22 1352     WBC 6 30 Thousand/uL      RBC 3 80 Million/uL      Hemoglobin 13 1 g/dL      Hematocrit 38 6 %       fL      MCH 34 5 pg      MCHC 33 9 g/dL      RDW 12 1 %      MPV 8 8 fL      Platelets 661 Thousands/uL      nRBC 0 /100 WBCs      Neutrophils Relative 62 %      Immat GRANS % 0 %      Lymphocytes Relative 20 %      Monocytes Relative 11 %      Eosinophils Relative 6 %      Basophils Relative 1 %      Neutrophils Absolute 3 96 Thousands/µL      Immature Grans Absolute 0 02 Thousand/uL      Lymphocytes Absolute 1 27 Thousands/µL      Monocytes Absolute 0 67 Thousand/µL      Eosinophils Absolute 0 35 Thousand/µL      Basophils Absolute 0 03 Thousands/µL                  XR chest 1 view portable   Final Result by Cheng Arceo MD (05/11 1512)   Small left pleural effusion      No acute cardiopulmonary disease  Workstation performed: [id number]                    Procedures  ECG 12 Lead Documentation Only    Date/Time: 5/11/2022 1:51 PM  Performed by: Lucille Callaway MD  Authorized by: Lucille Callaway MD     Indications / Diagnosis:  Near syncope  ECG reviewed by me, the ED Provider: yes    Patient location:  ED  Interpretation:     Interpretation: normal    Rate:     ECG rate:  68    ECG rate assessment: normal    Rhythm:     Rhythm: sinus rhythm    Ectopy:     Ectopy: none    QRS:     QRS axis:  Normal    QRS intervals:  Normal  Conduction:     Conduction: normal    ST segments:     ST segments:  Normal  T waves:     T waves: normal               ED Course                                             MDM  Number of Diagnoses or Management Options  Near syncope: new and requires workup  Diagnosis management comments: Near syncopal event last night, continued intermittent dizziness today  No focal deficits at this time  No chest pain, no headache  Reports slight blurred vision bilaterally when this happens  Denies any medications except for steroids, finished roughly last week  Was on this for lower back  Denies any weight loss or weight gain  Low-grade temperature in triage, nonfocal examination  Will check EKG, chest x-ray, basic laboratory studies, COVID/RSV/Influenza  ER workup unremarkable, a patient improved after IV fluids, was able to stand without difficulty, no orthostasis on vital signs  States he feels better    Admits to not drinking too much water over the past several days, had busy weekend with wedding/graduation ceremony  Works as a , has been busy, not too much fluid intake daily  Feels at his baseline, no pain, no lightheadedness when standing emergency department, stable time of discharge home, encourage increased fluid intake over the next couple days, PCP follow up, return precautions given  Amount and/or Complexity of Data Reviewed  Clinical lab tests: ordered and reviewed  Tests in the radiology section of CPT®: reviewed and ordered  Tests in the medicine section of CPT®: reviewed and ordered    Risk of Complications, Morbidity, and/or Mortality  Presenting problems: high  Diagnostic procedures: moderate  Management options: high    Patient Progress  Patient progress: improved      Disposition  Final diagnoses:   Near syncope     Time reflects when diagnosis was documented in both MDM as applicable and the Disposition within this note     Time User Action Codes Description Comment    5/11/2022  3:28 PM Bárbara Mina Add [R55] Near syncope       ED Disposition     ED Disposition   Discharge    Condition   Stable    Date/Time   Wed May 11, 2022  3:28 PM    Carrie Landin discharge to home/self care  Follow-up Information     Follow up With Specialties Details Why Contact Info Additional Information    Bárbara Pandya MD Family Medicine Schedule an appointment as soon as possible for a visit in 2 days As needed [address] [facility] [address]  [address] [facility] Emergency Department Emergency Medicine Go to  If symptoms worsen [address] [facility] [address] Emergency Department, [address], [place], [place], [address]          Patient's Medications   Discharge Prescriptions    No medications on file       No discharge procedures on file      PDMP Review     None          ED Provider  Electronically Signed by Lucía Fragoso MD  05/11/22 1539

## 2022-05-11 NOTE — TELEPHONE ENCOUNTER
Patient's wife reports patient had an episode last night while shopping  He collapsed & was "paralyzed" from waist down & trembeling  Instructed to be evaluated at ED

## 2022-05-17 ENCOUNTER — OFFICE VISIT (OUTPATIENT)
Dept: FAMILY MEDICINE CLINIC | Facility: CLINIC | Age: 77
End: 2022-05-17
Payer: COMMERCIAL

## 2022-05-17 VITALS
WEIGHT: 211.5 LBS | DIASTOLIC BLOOD PRESSURE: 70 MMHG | HEART RATE: 74 BPM | BODY MASS INDEX: 32.05 KG/M2 | SYSTOLIC BLOOD PRESSURE: 120 MMHG | TEMPERATURE: 97 F | HEIGHT: 68 IN | OXYGEN SATURATION: 96 %

## 2022-05-17 DIAGNOSIS — E86.0 DEHYDRATION: Primary | ICD-10-CM

## 2022-05-17 PROCEDURE — 1036F TOBACCO NON-USER: CPT | Performed by: NURSE PRACTITIONER

## 2022-05-17 PROCEDURE — 1160F RVW MEDS BY RX/DR IN RCRD: CPT | Performed by: NURSE PRACTITIONER

## 2022-05-17 PROCEDURE — 99214 OFFICE O/P EST MOD 30 MIN: CPT | Performed by: NURSE PRACTITIONER

## 2022-05-17 NOTE — PROGRESS NOTES
Assessment/Plan:     Diagnoses and all orders for this visit:    Dehydration      Discussed with patient encouraged to consume more liquids to correct current hydration status and to maintain adequate hydration especially while working during the summer  Patient instructed to call if no improvement in 72 hours or symptoms worsen    Subjective:      Patient ID: Eugenio Medina is a 68 y o  male  68 y o male presenting for a emergency room follow-up  Patient was seen in the emergency room of [facility] on 2022 for a near syncope episode  The emergency room work-up was negative for acute findings  He does not recall much of the near syncope episode but his wife was present and witnessed the whole thing  Patient reports that he has intermittent dizziness with slight blurred vision when his symptoms occur  He does report that he does not drink fluids much especially while at work  He denies headache, chest pain, palpitations, shortness of breath, or dyspnea with exertion during the episodes or prior to them occurring  Family History   Problem Relation Age of Onset    Other Mother         brain tumor    Coronary artery disease Mother     No Known Problems Father      Social History     Socioeconomic History    Marital status: /Civil Union     Spouse name: Not on file    Number of children: Not on file    Years of education: Not on file    Highest education level: Not on file   Occupational History    Not on file   Tobacco Use    Smoking status: Former Smoker     Packs/day: 1 00     Years: 20 00     Pack years: 20 00     Quit date:      Years since quittin 4    Smokeless tobacco: Never Used   Substance and Sexual Activity    Alcohol use:  Yes    Drug use: No    Sexual activity: Not on file   Other Topics Concern    Not on file   Social History Narrative    Not on file     Social Determinants of Health     Financial Resource Strain: Not on file   Food Insecurity: Not on file   Transportation Needs: Not on file   Physical Activity: Not on file   Stress: Not on file   Social Connections: Not on file   Intimate Partner Violence: Not on file   Housing Stability: Not on file     E-Cigarette/Vaping     E-Cigarette/Vaping Substances     Past Medical History:   Diagnosis Date    Abnormal electrocardiography     resolved: 11/21/2017    Abnormal laboratory test     last assessed: 11/16/2016    Acute myocardial infarction of inferior wall ([facility] [address] ) 1/20/2015    Anxiety     Community acquired pneumonia     last assessed: 10/15/2015    Complete tear of left rotator cuff     unspecified laterality    Coronary artery disease     Depression     GERD (gastroesophageal reflux disease)     Hyperlipidemia     Hypertension     Impingement syndrome of left shoulder 3/9/2015    Inguinal mass     last assessed: 1/21/2015    Myocardial infarction ([facility] [address] )     Nontraumatic rupture of tendons of biceps (long head), left     last assessed: 1/2/2017    Right trigger finger     last assessed: 1/13/2015    Skin lesion     last assessed: 4/21/2016    Skin lesion of cheek     last assessed: 2/25/2016    Thrombocytopenia ([facility] [address] ) 1/13/2015     Past Surgical History:   Procedure Laterality Date    CAROTID STENT      transcath placement of carotid artery stent    COLONOSCOPY      CORONARY ANGIOPLASTY      CO COLONOSCOPY FLX DX W/COLLJ SPEC WHEN PFRMD N/A 11/3/2017    Procedure: COLONOSCOPY;  Surgeon: Elizabeth Lozada MD;  [facility];   Service: Colorectal    ROTATOR CUFF REPAIR Bilateral     SHOULDER SURGERY       Allergies   Allergen Reactions    Atorvastatin GI Intolerance       Current Outpatient Medications:     aspirin (ECOTRIN LOW STRENGTH) 81 mg EC tablet, Take 81 mg by mouth daily, Disp: , Rfl:     atenolol (TENORMIN) 25 mg tablet, Take 1 tablet (25 mg total) by mouth daily, Disp: 90 tablet, Rfl: 1    celecoxib (CeleBREX) 200 mg capsule, Take 200 mg by mouth in the morning and 200 mg in the evening , Disp: , Rfl:     Coenzyme Q10 200 MG capsule, Take 400 mg by mouth daily , Disp: , Rfl:     Multiple Vitamins-Minerals (ONE-A-DAY 50 PLUS PO), Take 1 tablet by mouth daily, Disp: , Rfl:     omega-3-acid ethyl esters (LOVAZA) 1 g capsule, TAKE 2 CAPSULES (2 G TOTAL) BY MOUTH 2 (TWO) TIMES A DAY, Disp: 360 capsule, Rfl: 3    pantoprazole (PROTONIX) 20 mg tablet, Take 20 mg by mouth in the morning  Patient cutting in half, taking 10 mg daily   , Disp: , Rfl:     PARoxetine (PAXIL) 10 mg tablet, Take 10 mg by mouth daily, Disp: , Rfl:     simvastatin (ZOCOR) 40 mg tablet, Take 20 mg by mouth daily at bedtime Patient taking 20 mg, half of a 40 mg tablet , Disp: , Rfl:     tamsulosin (FLOMAX) 0 4 mg, Take 0 4 mg by mouth daily, Disp: , Rfl:     ibuprofen (MOTRIN) 800 mg tablet, TAKE 1 TABLET BY MOUTH EVERY 8 HOURS WITH FOOD AS NEEDED (Patient not taking: No sig reported), Disp: , Rfl: 0    losartan-hydrochlorothiazide (HYZAAR) 50-12 5 mg per tablet, Take 1 tablet by mouth every morning, Disp: , Rfl:     pantoprazole (PROTONIX) 40 mg tablet, TAKE 1 TABLET BY MOUTH EVERY DAY (Patient not taking: No sig reported), Disp: 90 tablet, Rfl: 1    rosuvastatin (CRESTOR) 10 MG tablet, Take 1 tablet (10 mg total) by mouth daily (Patient not taking: No sig reported), Disp: 30 tablet, Rfl: 5    sildenafil (VIAGRA) 100 mg tablet, Take 1 tablet (100 mg total) by mouth daily as needed for erectile dysfunction (Patient not taking: No sig reported), Disp: 6 tablet, Rfl: 5    valsartan-hydrochlorothiazide (DIOVAN-HCT) 160-12 5 MG per tablet, TAKE 1 TABLET BY MOUTH EVERY DAY, Disp: 90 tablet, Rfl: 1    Review of Systems   Constitutional: Negative  Eyes: Negative  Respiratory: Negative  Cardiovascular: Negative  Musculoskeletal: Negative  Neurological: Negative  Psychiatric/Behavioral: Negative          Objective:    /70 (BP Location: Left arm, Patient Position: Sitting, Cuff Size: Standard)   Pulse 74   Temp (!) 97 °F (36 1 °C)   Ht 5' 8" (1 727 m)   Wt 95 9 kg (211 lb 8 oz)   SpO2 96%   BMI 32 16 kg/m² (Reviewed)     Physical Exam  Vitals reviewed  Constitutional:       General: He is not in acute distress  Appearance: He is well-developed and well-groomed  He is not ill-appearing  HENT:      Head: Normocephalic and atraumatic  Right Ear: External ear normal       Left Ear: External ear normal    Eyes:      General: Lids are normal       Extraocular Movements: Extraocular movements intact  Conjunctiva/sclera: Conjunctivae normal       Pupils: Pupils are equal, round, and reactive to light  Neck:      Trachea: Trachea and phonation normal    Cardiovascular:      Rate and Rhythm: Normal rate and regular rhythm  Pulses:           Carotid pulses are 2+ on the right side and 2+ on the left side  Radial pulses are 2+ on the right side and 2+ on the left side  Dorsalis pedis pulses are 2+ on the right side and 2+ on the left side  Posterior tibial pulses are 2+ on the right side and 2+ on the left side  Heart sounds: Normal heart sounds  No murmur heard  No friction rub  No gallop  Pulmonary:      Effort: Pulmonary effort is normal       Breath sounds: Normal breath sounds  Musculoskeletal:      Cervical back: Neck supple  Right lower leg: No edema  Left lower leg: No edema  Skin:     General: Skin is warm and dry  Capillary Refill: Capillary refill takes less than 2 seconds  Neurological:      General: No focal deficit present  Mental Status: He is alert and oriented to person, place, and time  Psychiatric:         Mood and Affect: Mood normal          Behavior: Behavior normal  Behavior is cooperative  Thought Content:  Thought content normal

## 2022-06-02 ENCOUNTER — TELEPHONE (OUTPATIENT)
Dept: OBGYN CLINIC | Facility: HOSPITAL | Age: 77
End: 2022-06-02

## 2022-06-10 ENCOUNTER — TELEPHONE (OUTPATIENT)
Dept: OBGYN CLINIC | Facility: CLINIC | Age: 77
End: 2022-06-10

## 2022-06-10 NOTE — TELEPHONE ENCOUNTER
Medical Records recvd via [employer] from [address] [facility]     Sending 3 batches of 100+ pages in each batch to [employer] to scan to chart    Packet was addressed to Dr Cristian Delaney

## 2022-06-20 ENCOUNTER — TELEPHONE (OUTPATIENT)
Dept: OBGYN CLINIC | Facility: HOSPITAL | Age: 77
End: 2022-06-20

## 2022-06-20 NOTE — TELEPHONE ENCOUNTER
Patient called to clarify she had her 's records sent to us for up coming appointment  During this process , patient was able to get another appointment outside network and have surgery done  Appointment was cancelled , but records sent to chart  No appointment needed at this time

## 2022-06-22 ENCOUNTER — APPOINTMENT (OUTPATIENT)
Dept: LAB | Facility: CLINIC | Age: 77
End: 2022-06-22
Payer: COMMERCIAL

## 2022-06-22 DIAGNOSIS — N40.1 ENLARGED PROSTATE WITH URINARY OBSTRUCTION: ICD-10-CM

## 2022-06-22 DIAGNOSIS — N13.8 ENLARGED PROSTATE WITH URINARY OBSTRUCTION: ICD-10-CM

## 2022-06-22 LAB
BUN SERPL-MCNC: 16 MG/DL (ref 5–25)
CREAT SERPL-MCNC: 0.92 MG/DL (ref 0.6–1.3)
GFR SERPL CREATININE-BSD FRML MDRD: 80 ML/MIN/1.73SQ M
PSA SERPL-MCNC: 0.7 NG/ML (ref 0–4)

## 2022-06-22 PROCEDURE — 36415 COLL VENOUS BLD VENIPUNCTURE: CPT

## 2022-06-22 PROCEDURE — 84520 ASSAY OF UREA NITROGEN: CPT

## 2022-06-22 PROCEDURE — 84153 ASSAY OF PSA TOTAL: CPT

## 2022-06-22 PROCEDURE — 82565 ASSAY OF CREATININE: CPT

## 2022-06-23 ENCOUNTER — APPOINTMENT (OUTPATIENT)
Dept: LAB | Facility: HOSPITAL | Age: 77
End: 2022-06-23
Attending: UROLOGY
Payer: COMMERCIAL

## 2022-06-23 ENCOUNTER — OFFICE VISIT (OUTPATIENT)
Dept: CARDIOLOGY CLINIC | Facility: CLINIC | Age: 77
End: 2022-06-23
Payer: COMMERCIAL

## 2022-06-23 VITALS
SYSTOLIC BLOOD PRESSURE: 120 MMHG | BODY MASS INDEX: 30.7 KG/M2 | WEIGHT: 201.9 LBS | DIASTOLIC BLOOD PRESSURE: 80 MMHG | HEART RATE: 62 BPM

## 2022-06-23 DIAGNOSIS — I10 ESSENTIAL HYPERTENSION: Primary | ICD-10-CM

## 2022-06-23 DIAGNOSIS — I25.10 CORONARY ARTERIOSCLEROSIS: ICD-10-CM

## 2022-06-23 DIAGNOSIS — E78.00 PURE HYPERCHOLESTEROLEMIA: ICD-10-CM

## 2022-06-23 DIAGNOSIS — N39.0 URINARY TRACT INFECTION WITHOUT HEMATURIA, SITE UNSPECIFIED: ICD-10-CM

## 2022-06-23 PROCEDURE — 99214 OFFICE O/P EST MOD 30 MIN: CPT | Performed by: INTERNAL MEDICINE

## 2022-06-23 PROCEDURE — 93000 ELECTROCARDIOGRAM COMPLETE: CPT | Performed by: INTERNAL MEDICINE

## 2022-06-23 PROCEDURE — 87086 URINE CULTURE/COLONY COUNT: CPT

## 2022-06-23 PROCEDURE — 1160F RVW MEDS BY RX/DR IN RCRD: CPT | Performed by: INTERNAL MEDICINE

## 2022-06-23 PROCEDURE — 1036F TOBACCO NON-USER: CPT | Performed by: INTERNAL MEDICINE

## 2022-06-23 NOTE — PROGRESS NOTES
Cardiology Follow Up    Serafin Mendoza  1945  [id number]  [address]  [facility]  [place] [address]   [phone]    1  Essential hypertension  POCT ECG   2  Pure hypercholesterolemia     3  Coronary arteriosclerosis         Interval History: Patient is here for a follow-up visit and clearance for hip surgery  He had PCI in the setting of IWMI 2/2004  Repeat catheterization July 2004 showed patent stents   Echocardiogram done January 2021 demonstrated preserved LV systolic function and no significant valvular heart disease  Pharmacologic nuclear stress test done February 2021 demonstrated no evidence of ischemia  The LVEF was 68%  Lipid profile done 3/2022 looked good  Patient has been well  He has had no chest pain or significant dyspnea  EKG today demonstrates sinus rhythm with minor nonspecific ST segment changes with no significant change compared to a prior tracing done May 11, 2022  He had been seen in the ED May 2022 with syncope and dizziness  He improved with IV fluid  I asked him to call me if he has further episodes  He does get intermittent lightheadedness with changing positions  Losartan and valsartan are listed on his med sheet  He will check when he goes home and calls me as to which one he is taking  He is okay for his hip surgery from my point of view  It is scheduled in July  He does not require further testing in that regard       Patient Active Problem List   Diagnosis    CAD (coronary atherosclerotic disease)    Hyperlipidemia    Hypertension    Bilateral carotid artery disease ([address] )    ARIELLE (generalized anxiety disorder)    Impaired fasting glucose    Benign colon polyp    Diverticulosis    Dupuytren's contracture    GERD without esophagitis    Benign prostatic hyperplasia with nocturia    Depressive disorder    Right rotator cuff tear     Past Medical History:   Diagnosis Date    Abnormal electrocardiography     resolved: 2017    Abnormal laboratory test     last assessed: 2016    Acute myocardial infarction of inferior wall ([facility] [address] ) 2015    Anxiety     Community acquired pneumonia     last assessed: 10/15/2015    Complete tear of left rotator cuff     unspecified laterality    Coronary artery disease     Depression     GERD (gastroesophageal reflux disease)     Hyperlipidemia     Hypertension     Impingement syndrome of left shoulder 3/9/2015    Inguinal mass     last assessed: 2015    Myocardial infarction [facility])     Nontraumatic rupture of tendons of biceps (long head), left     last assessed: 2017    Right trigger finger     last assessed: 2015    Skin lesion     last assessed: 2016    Skin lesion of cheek     last assessed: 2016    Thrombocytopenia ([facility] [address] ) 2015     Social History     Socioeconomic History    Marital status: /Civil Union     Spouse name: Not on file    Number of children: Not on file    Years of education: Not on file    Highest education level: Not on file   Occupational History    Not on file   Tobacco Use    Smoking status: Former Smoker     Packs/day: 1 00     Years: 20 00     Pack years: 20 00     Quit date:      Years since quittin 5    Smokeless tobacco: Never Used   Substance and Sexual Activity    Alcohol use:  Yes    Drug use: No    Sexual activity: Not on file   Other Topics Concern    Not on file   Social History Narrative    Not on file     Social Determinants of Health     Financial Resource Strain: Not on file   Food Insecurity: Not on file   Transportation Needs: Not on file   Physical Activity: Not on file   Stress: Not on file   Social Connections: Not on file   Intimate Partner Violence: Not on file   Housing Stability: Not on file      Family History   Problem Relation Age of Onset    Other Mother         brain tumor    Coronary artery disease Mother     No Known Problems Father      Past Surgical History:   Procedure Laterality Date    CAROTID STENT      transcath placement of carotid artery stent    COLONOSCOPY      CORONARY ANGIOPLASTY      GA COLONOSCOPY FLX DX W/COLLJ SPEC WHEN PFRMD N/A 11/3/2017    Procedure: COLONOSCOPY;  Surgeon: Wily Jeter MD;  [facility]; Service: Colorectal    ROTATOR CUFF REPAIR Bilateral     SHOULDER SURGERY         Current Outpatient Medications:     aspirin (ECOTRIN LOW STRENGTH) 81 mg EC tablet, Take 81 mg by mouth daily, Disp: , Rfl:     atenolol (TENORMIN) 25 mg tablet, Take 1 tablet (25 mg total) by mouth daily, Disp: 90 tablet, Rfl: 1    Coenzyme Q10 200 MG capsule, Take 400 mg by mouth daily , Disp: , Rfl:     losartan-hydrochlorothiazide (HYZAAR) 50-12 5 mg per tablet, Take 1 tablet by mouth every morning, Disp: , Rfl:     Multiple Vitamins-Minerals (ONE-A-DAY 50 PLUS PO), Take 1 tablet by mouth daily, Disp: , Rfl:     omega-3-acid ethyl esters (LOVAZA) 1 g capsule, TAKE 2 CAPSULES (2 G TOTAL) BY MOUTH 2 (TWO) TIMES A DAY, Disp: 360 capsule, Rfl: 3    pantoprazole (PROTONIX) 20 mg tablet, Take 20 mg by mouth in the morning  Patient cutting in half, taking 10 mg daily    , Disp: , Rfl:     simvastatin (ZOCOR) 40 mg tablet, Take 20 mg by mouth daily at bedtime Patient taking 20 mg, half of a 40 mg tablet , Disp: , Rfl:     tamsulosin (FLOMAX) 0 4 mg, Take 0 4 mg by mouth daily, Disp: , Rfl:     valsartan-hydrochlorothiazide (DIOVAN-HCT) 160-12 5 MG per tablet, TAKE 1 TABLET BY MOUTH EVERY DAY, Disp: 90 tablet, Rfl: 1    celecoxib (CeleBREX) 200 mg capsule, Take 200 mg by mouth in the morning and 200 mg in the evening , Disp: , Rfl:     ibuprofen (MOTRIN) 800 mg tablet, TAKE 1 TABLET BY MOUTH EVERY 8 HOURS WITH FOOD AS NEEDED (Patient not taking: No sig reported), Disp: , Rfl: 0    pantoprazole (PROTONIX) 40 mg tablet, TAKE 1 TABLET BY MOUTH EVERY DAY (Patient not taking: No sig reported), Disp: 90 tablet, Rfl: 1    PARoxetine (PAXIL) 10 mg tablet, Take 10 mg by mouth daily (Patient not taking: Reported on 6/23/2022), Disp: , Rfl:     rosuvastatin (CRESTOR) 10 MG tablet, Take 1 tablet (10 mg total) by mouth daily (Patient not taking: No sig reported), Disp: 30 tablet, Rfl: 5    sildenafil (VIAGRA) 100 mg tablet, Take 1 tablet (100 mg total) by mouth daily as needed for erectile dysfunction (Patient not taking: No sig reported), Disp: 6 tablet, Rfl: 5  Allergies   Allergen Reactions    Atorvastatin GI Intolerance       Labs:not applicable  Imaging: No results found  Review of Systems:  Review of Systems   All other systems reviewed and are negative  Physical Exam:  /80 (BP Location: Right arm, Patient Position: Sitting, Cuff Size: Standard)   Pulse 62   Wt 91 6 kg (201 lb 14 4 oz)   BMI 30 70 kg/m²   Physical Exam  Vitals reviewed  Constitutional:       Appearance: He is well-developed  HENT:      Head: Normocephalic and atraumatic  Eyes:      Conjunctiva/sclera: Conjunctivae normal       Pupils: Pupils are equal, round, and reactive to light  Cardiovascular:      Rate and Rhythm: Normal rate  Heart sounds: Normal heart sounds  Pulmonary:      Effort: Pulmonary effort is normal       Breath sounds: Normal breath sounds  Musculoskeletal:      Cervical back: Normal range of motion and neck supple  Skin:     General: Skin is warm and dry  Neurological:      Mental Status: He is alert and oriented to person, place, and time  Discussion/Summary:I will continue the patient's present medical regimen  Patient appears well compensated  I have asked the patient to call if there is a problem in the interim otherwise I will see the patient in one years time  Patient is cleared for his upcoming hip replacement  I completed the clearance form and we will fax this to the orthopedic office    As noted he will clarify whether he is taking losartan or valsartan as both are listed

## 2022-06-25 LAB — BACTERIA UR CULT: NORMAL

## 2022-07-08 ENCOUNTER — TELEPHONE (OUTPATIENT)
Dept: CARDIOLOGY CLINIC | Facility: CLINIC | Age: 77
End: 2022-07-08

## 2022-07-08 NOTE — TELEPHONE ENCOUNTER
P/c'd , will have hip replacement on 7/18/22  Ortho wants him to hold ASA, Fish oil, and mutli vits for one week prior to surgery  He wanted to make sure you are ok with that        Pl;ease advise

## 2022-07-12 ENCOUNTER — OFFICE VISIT (OUTPATIENT)
Dept: FAMILY MEDICINE CLINIC | Facility: CLINIC | Age: 77
End: 2022-07-12
Payer: COMMERCIAL

## 2022-07-12 VITALS
HEART RATE: 62 BPM | WEIGHT: 208.5 LBS | BODY MASS INDEX: 31.6 KG/M2 | DIASTOLIC BLOOD PRESSURE: 70 MMHG | TEMPERATURE: 97.4 F | SYSTOLIC BLOOD PRESSURE: 120 MMHG | OXYGEN SATURATION: 93 % | HEIGHT: 68 IN

## 2022-07-12 DIAGNOSIS — Z01.818 PREOPERATIVE EXAMINATION: Primary | ICD-10-CM

## 2022-07-12 DIAGNOSIS — M16.12 PRIMARY OSTEOARTHRITIS OF LEFT HIP: ICD-10-CM

## 2022-07-12 PROCEDURE — 1101F PT FALLS ASSESS-DOCD LE1/YR: CPT | Performed by: NURSE PRACTITIONER

## 2022-07-12 PROCEDURE — 1160F RVW MEDS BY RX/DR IN RCRD: CPT | Performed by: NURSE PRACTITIONER

## 2022-07-12 PROCEDURE — 3288F FALL RISK ASSESSMENT DOCD: CPT | Performed by: NURSE PRACTITIONER

## 2022-07-12 PROCEDURE — 99242 OFF/OP CONSLTJ NEW/EST SF 20: CPT | Performed by: NURSE PRACTITIONER

## 2022-07-12 NOTE — PROGRESS NOTES
Subjective:      Patient ID: Rupert Mayes is a 68 y o  male  HPI:  Rupert Mayes is a 68 y o  male who presents to the office today for a preoperative consultation at the request of Dr Dinorah Singh MD for left hip total arthoplasty on 07/18/[facility]22 at [facility]    Current anti-platelet/anti-coagulation medications that the patient is prescribed include: Aspirin and Coenzyme Q10    Assessment of Cardiac Risk:  Denies unstable or severe angina or MI in the last 6 weeks or history of stent placement in the last year  Denies decompensated heart failure (e g  New onset failure, NYHA functional class IV heart failure, or worsening existing heart failure)  Denies significant arrhythmia such as high grade AV Block, symptomatic ventricular arrhythmia, newly recognized ventricular tachycardia, supraventricular tachycardia with resting heart rate > 100 or symptomatic bradycardia  Denies severe heart valve disease including aortic stenosis or symptomatic mitral stenosis  Prior anesthesia:     Patient as had multiple surgeries in the past with no adverse reaction to anesthesia  No report family history of adverse reactions to anesthesia, including malignant hyperthermia and pseudocholinesterase deficiency    Exercise Capacity:     Able to walk 4 blocks without CV symptoms? Yes  Able to climb 2 flights of stairs without CV symptoms? Yes    Lifestyle Factors: Tobacco Use:  Former smoker (quit 1978)        Pack years: 20  Alcohol Use:  Socially  Illicit Drug Use:  Denies  No Zoroastrian or cultural beliefs that would alteration to care        CB Risk Factors:  Obesity    Personal history of venous thromboembolic disease:  No  History of Steroid use for >2 weeks within last year?    No    Family History   Problem Relation Age of Onset    Other Mother         brain tumor    Coronary artery disease Mother     No Known Problems Father      Social History     Socioeconomic History    Marital status: /Civil Union     Spouse name: Not on file    Number of children: Not on file    Years of education: Not on file    Highest education level: Not on file   Occupational History    Not on file   Tobacco Use    Smoking status: Former Smoker     Packs/day: 1 00     Years: 20 00     Pack years: 20 00     Quit date: 1     Years since quittin 5    Smokeless tobacco: Never Used   Substance and Sexual Activity    Alcohol use:  Yes    Drug use: No    Sexual activity: Not on file   Other Topics Concern    Not on file   Social History Narrative    Not on file     Social Determinants of Health     Financial Resource Strain: Not on file   Food Insecurity: Not on file   Transportation Needs: Not on file   Physical Activity: Not on file   Stress: Not on file   Social Connections: Not on file   Intimate Partner Violence: Not on file   Housing Stability: Not on file     E-Cigarette/Vaping     E-Cigarette/Vaping Substances     Past Medical History:   Diagnosis Date    Abnormal electrocardiography     resolved: 2017    Abnormal laboratory test     last assessed: 2016    Acute myocardial infarction of inferior wall ([address] ) 2015    Anxiety     Community acquired pneumonia     last assessed: 10/15/2015    Complete tear of left rotator cuff     unspecified laterality    Coronary artery disease     Depression     GERD (gastroesophageal reflux disease)     Hyperlipidemia     Hypertension     Impingement syndrome of left shoulder 3/9/2015    Inguinal mass     last assessed: 2015    Myocardial infarction ([address] )     Nontraumatic rupture of tendons of biceps (long head), left     last assessed: 2017    Right trigger finger     last assessed: 2015    Skin lesion     last assessed: 2016    Skin lesion of cheek     last assessed: 2016    Thrombocytopenia ([facility] [address] ) 2015     Past Surgical History:   Procedure Laterality Date    CAROTID STENT      transcath placement of carotid artery stent    COLONOSCOPY      CORONARY ANGIOPLASTY      OH COLONOSCOPY FLX DX W/COLLJ SPEC WHEN PFRMD N/A 11/3/2017    Procedure: COLONOSCOPY;  Surgeon: Fanta Willoughby MD;  Location[facility]  GI LAB; Service: Colorectal    ROTATOR CUFF REPAIR Bilateral     SHOULDER SURGERY       Allergies   Allergen Reactions    Atorvastatin GI Intolerance       Current Outpatient Medications:     atenolol (TENORMIN) 25 mg tablet, Take 1 tablet (25 mg total) by mouth daily, Disp: 90 tablet, Rfl: 1    ibuprofen (MOTRIN) 800 mg tablet, TAKE 1 TABLET BY MOUTH EVERY 8 HOURS WITH FOOD AS NEEDED, Disp: , Rfl: 0    mupirocin (BACTROBAN) 2 % ointment, APPLY TOPICALLY 2 (TWO) TIMES A DAY  BEGIN 5 DAYS PRIOR TO SURGERY, APPLY TO NOSTRILS, Disp: , Rfl:     pantoprazole (PROTONIX) 20 mg tablet, Take 20 mg by mouth in the morning  Patient cutting in half, taking 10 mg daily    , Disp: , Rfl:     PARoxetine (PAXIL) 10 mg tablet, Take 10 mg by mouth daily, Disp: , Rfl:     simvastatin (ZOCOR) 40 mg tablet, Take 20 mg by mouth daily at bedtime Patient taking 20 mg, half of a 40 mg tablet , Disp: , Rfl:     tamsulosin (FLOMAX) 0 4 mg, Take 0 4 mg by mouth daily, Disp: , Rfl:     aspirin (ECOTRIN LOW STRENGTH) 81 mg EC tablet, Take 81 mg by mouth daily (Patient not taking: Reported on 7/12/2022), Disp: , Rfl:     Coenzyme Q10 200 MG capsule, Take 400 mg by mouth daily  (Patient not taking: Reported on 7/12/2022), Disp: , Rfl:     losartan-hydrochlorothiazide (HYZAAR) 50-12 5 mg per tablet, Take 1 tablet by mouth every morning, Disp: , Rfl:     Multiple Vitamins-Minerals (ONE-A-DAY 50 PLUS PO), Take 1 tablet by mouth daily (Patient not taking: Reported on 7/12/2022), Disp: , Rfl:     omega-3-acid ethyl esters (LOVAZA) 1 g capsule, TAKE 2 CAPSULES (2 G TOTAL) BY MOUTH 2 (TWO) TIMES A DAY (Patient not taking: Reported on 7/12/2022), Disp: 360 capsule, Rfl: 3    sildenafil (VIAGRA) 100 mg tablet, Take 1 tablet (100 mg total) by mouth daily as needed for erectile dysfunction (Patient not taking: No sig reported), Disp: 6 tablet, Rfl: 5    valsartan-hydrochlorothiazide (DIOVAN-HCT) 160-12 5 MG per tablet, TAKE 1 TABLET BY MOUTH EVERY DAY, Disp: 90 tablet, Rfl: 1    Review of Systems   Constitutional: Negative for activity change, appetite change and unexpected weight change  HENT: Negative for dental problem, ear pain, hearing loss, nosebleeds, sneezing, sore throat, tinnitus and trouble swallowing  Eyes: Negative for visual disturbance  Respiratory: Negative for cough, chest tightness, shortness of breath and wheezing  Cardiovascular: Negative for chest pain, palpitations and leg swelling  Gastrointestinal: Negative for abdominal distention, abdominal pain, constipation, diarrhea and nausea  Endocrine: Negative for polydipsia, polyphagia and polyuria  Genitourinary: Negative  Musculoskeletal: Positive for arthralgias  Negative for back pain, myalgias and neck pain  Skin: Negative for color change and rash  Allergic/Immunologic: Negative for environmental allergies  Neurological: Negative for dizziness, weakness, light-headedness and headaches  Psychiatric/Behavioral: Negative  Negative for dysphoric mood and sleep disturbance  The patient is not nervous/anxious  Objective:    /70 (BP Location: Left arm, Patient Position: Sitting, Cuff Size: Standard)   Pulse 62   Temp (!) 97 4 °F (36 3 °C)   Ht 5' 8" (1 727 m)   Wt 94 6 kg (208 lb 8 oz)   SpO2 93%   BMI 31 70 kg/m² (Reviewed)     Physical Exam  Vitals reviewed  Constitutional:       General: He is not in acute distress  Appearance: He is well-developed and well-groomed  He is not ill-appearing  HENT:      Head: Normocephalic and atraumatic  Right Ear: External ear normal       Left Ear: External ear normal       Nose: Nose normal       Mouth/Throat:      Mouth: Mucous membranes are moist       Pharynx: Oropharynx is clear     Eyes: General: Lids are normal       Extraocular Movements: Extraocular movements intact  Conjunctiva/sclera: Conjunctivae normal       Pupils: Pupils are equal, round, and reactive to light  Neck:      Trachea: Trachea and phonation normal    Cardiovascular:      Rate and Rhythm: Normal rate and regular rhythm  Pulses: Normal pulses  Heart sounds: Normal heart sounds  Pulmonary:      Effort: Pulmonary effort is normal       Breath sounds: Normal breath sounds  Abdominal:      General: Bowel sounds are normal  There is no distension  Palpations: Abdomen is soft  Tenderness: There is no abdominal tenderness  Musculoskeletal:      Cervical back: Normal range of motion and neck supple  Right lower leg: Edema present  Left lower leg: Edema present  Skin:     General: Skin is warm and dry  Capillary Refill: Capillary refill takes less than 2 seconds  Neurological:      General: No focal deficit present  Mental Status: He is alert and oriented to person, place, and time  Psychiatric:         Mood and Affect: Mood normal          Behavior: Behavior normal  Behavior is cooperative           DATA:  Laboratory Results:  I have personally reviewed the pertinent laboratory results/reports -  Lab Results   Component Value Date    ALT 22 05/11/2022    AST 22 05/11/2022    BUN 16 06/22/2022    CALCIUM 8 9 05/11/2022     05/11/2022    CO2 28 05/11/2022    CREATININE 0 92 06/22/2022    HCT 38 6 05/11/2022    HGB 13 1 05/11/2022    MG 2 1 05/11/2022     05/11/2022    K 4 2 05/11/2022    PSA 0 7 06/22/2022     06/30/2022    WBC 6 30 05/11/2022     ECG: Sinus rhythm with minor nonspecific ST segment changes with no significant change compared to a prior tracing done May 11, 2022  (06/23/2022)     Pre-Op Evaluation Assessment:  Cardiac Risk Estimation: per the Revised Cardiac Risk Index (Circ  [id number], 1999): the patient's risk factors for cardiac complications include: CAD, putting him in : RCI Risk Class I (0 4% 30-day risk of death, MI or cardiac death)     Tahir Parikh is undergoing an elective intermediate risk surgery  He is at intermediate risk for major adverse cardiac event (MACE)  He may proceed with surgery as planned      Pre-op Evaluation Plan  1  Further preoperative workup as follows:  -No further preoperative work-up required     2  Medication Management/Recommendations:  -Patient has been instructed to avoid herbs or non-directed vitamins the week prior to surgery to ensure no drug interactions with perioperative surgical and anesthetic medications   -Patient has been instructed to avoid aspirin containing medications or non-steroidal anti-inflammatory drugs for the week preceding surgery  3  Cardiac Clearance obtained on 06/23/2022        Assessment/Plan:    Diagnoses and all orders for this visit:    Preoperative examination    Primary osteoarthritis of left hip    Other orders  -     mupirocin (BACTROBAN) 2 % ointment; APPLY TOPICALLY 2 (TWO) TIMES A DAY   BEGIN 5 DAYS PRIOR TO SURGERY, APPLY TO NOSTRILS       He may proceed with surgery as planned

## 2022-07-26 ENCOUNTER — TELEPHONE (OUTPATIENT)
Dept: CARDIOLOGY CLINIC | Facility: CLINIC | Age: 77
End: 2022-07-26

## 2022-07-26 NOTE — TELEPHONE ENCOUNTER
P/c'd , states ortho wants him to stay off of fish oil until he is seen by them post op  He is now having ingestion  Advised he call their office to ask if he can restart and if not why? Verbally understood

## 2022-08-05 ENCOUNTER — AMB VIDEO VISIT (OUTPATIENT)
Dept: FAMILY MEDICINE CLINIC | Facility: CLINIC | Age: 77
End: 2022-08-05
Payer: COMMERCIAL

## 2022-08-05 ENCOUNTER — NURSE TRIAGE (OUTPATIENT)
Dept: OTHER | Facility: OTHER | Age: 77
End: 2022-08-05

## 2022-08-05 DIAGNOSIS — M16.12 PRIMARY OSTEOARTHRITIS OF LEFT HIP: ICD-10-CM

## 2022-08-05 DIAGNOSIS — R05.9 COUGH: ICD-10-CM

## 2022-08-05 DIAGNOSIS — I25.10 ATHEROSCLEROSIS OF NATIVE CORONARY ARTERY OF NATIVE HEART WITHOUT ANGINA PECTORIS: ICD-10-CM

## 2022-08-05 DIAGNOSIS — E78.2 MIXED HYPERLIPIDEMIA: ICD-10-CM

## 2022-08-05 DIAGNOSIS — U07.1 COVID-19: Primary | ICD-10-CM

## 2022-08-05 PROCEDURE — 1160F RVW MEDS BY RX/DR IN RCRD: CPT | Performed by: FAMILY MEDICINE

## 2022-08-05 PROCEDURE — 99214 OFFICE O/P EST MOD 30 MIN: CPT | Performed by: FAMILY MEDICINE

## 2022-08-05 RX ORDER — ASPIRIN 81 MG/1
81 TABLET ORAL 2 TIMES DAILY
Start: 2022-08-05

## 2022-08-05 RX ORDER — CELECOXIB 200 MG/1
200 CAPSULE ORAL 2 TIMES DAILY
Start: 2022-08-05

## 2022-08-05 RX ORDER — EZETIMIBE 10 MG/1
5 TABLET ORAL DAILY
Start: 2022-08-05

## 2022-08-05 RX ORDER — BENZONATATE 200 MG/1
200 CAPSULE ORAL 3 TIMES DAILY PRN
Qty: 30 CAPSULE | Refills: 0 | Status: SHIPPED | OUTPATIENT
Start: 2022-08-05

## 2022-08-06 ENCOUNTER — NURSE TRIAGE (OUTPATIENT)
Dept: OTHER | Facility: OTHER | Age: 77
End: 2022-08-06

## 2022-08-06 NOTE — PROGRESS NOTES
COVID-19 Outpatient Progress Note    Assessment/Plan:    Problem List Items Addressed This Visit        Unprioritized    CAD (coronary atherosclerotic disease)    Relevant Medications    aspirin (ECOTRIN LOW STRENGTH) 81 mg EC tablet    Hyperlipidemia    Relevant Medications    ezetimibe (ZETIA) 10 mg tablet      Other Visit Diagnoses     COVID-19    -  Primary    Relevant Medications    nirmatrelvir & ritonavir (Paxlovid) tablet therapy pack    benzonatate (TESSALON) 200 MG capsule    Primary osteoarthritis of left hip        Relevant Medications    celecoxib (CeleBREX) 200 mg capsule    Cough        Relevant Medications    benzonatate (TESSALON) 200 MG capsule         Disposition:     Discussed symptom directed medication options with patient  Rest  Fluids  Tylenol  as appropriate  COVID testing +     isolate for 5 days since start of symptoms, then mask for the next 5 days  Do not return to school or work until you are fever free for 24 hours with fever-reducing medications  If not feeling well enough to return to work after 5 days we can extend work note  Call us if symptoms worsen or do no improve by 10 days  COVID GUIDELINES PER THE CDC AS OF December 2021:  -If You Test Positive for COVID-19 (Isolate)  Everyone, regardless of vaccination status  Stay home for 5 days   If you have no symptoms or your symptoms are resolving after 5 days, you can leave your house  Continue to wear a mask around others for 5 additional days  If you have a fever, continue to stay home until your fever resolves  -If You Were Exposed to Someone with COVID-19  If you:  Have been boosted OR Completed the primary series of Pfizer or Moderna vaccine within the last 6 months OR Completed the primary series of J&J vaccine within the last 2 months - You do not need to [employer] a mask around others for 10 days  Test on day 5, if possible    If you develop symptoms get a test and stay home for 5 days from the start of the symptoms  If you:  Completed the primary series of Pfizer or Moderna vaccine over 6 months ago and are not boosted OR Completed the primary series of J&J over 2 months ago and are not boosted OR Are unvaccinated - you should Quarantine  Stay home for 5 days  After that continue to wear a mask around others for 5 additional days  If you can't quarantine you must wear a mask for 10 days  Test on day 5 if possible  If you develop symptoms get a test and stay home for 5 days from the start of the symptoms  From the [facility] website as of 12/27/2021 CDC Updates and Shortens Recommended Isolation and Quarantine Period for [address] [place] [address] Dr GOEL         Patient meets criteria for PAXLOVID and they have been counseled appropriately according to EUA documentation released by the FDA  After discussion, patient agrees to treatment  Maria C Guzmánop is an investigational medicine used to treat mild-to-moderate COVID-19 in adults and children (15years of age and older weighing at least 80 pounds (40 kg)) with positive results of direct SARS-CoV-2 viral testing, and who are at high risk for progression to severe COVID-19, including hospitalization or death  PAXLOVID is investigational because it is still being studied  There is limited information about the safety and effectiveness of using PAXLOVID to treat people with mild-to-moderate COVID-19  The FDA has authorized the emergency use of PAXLOVID for the treatment of mild-tomoderate COVID-19 in adults and children (15years of age and older weighing at least 80 pounds (40 kg)) with a positive test for the virus that causes COVID-19, and who are at high risk for progression to severe COVID-19, including hospitalization or death, under an EUA  What should I tell my healthcare provider before I take PAXLOVID?     Tell your healthcare provider if you:  - Have any allergies  - Have liver or kidney disease  - Are pregnant or plan to become pregnant  - Are breastfeeding a child  - Have any serious illnesses    Tell your healthcare provider about all the medicines you take, including prescription and over-the-counter medicines, vitamins, and herbal supplements  Some medicines may interact with PAXLOVID and may cause serious side effects  Keep a list of your medicines to show your healthcare provider and pharmacist when you get a new medicine  You can ask your healthcare provider or pharmacist for a list of medicines that interact with PAXLOVID  Do not start taking a new medicine without telling your healthcare provider  Your healthcare provider can tell you if it is safe to take PAXLOVID with other medicines  Tell your healthcare provider if you are taking combined hormonal contraceptive  PAXLOVID may affect how your birth control pills work  Females who are able to become pregnant should use another effective alternative form of contraception or an additional barrier method of contraception  Talk to your healthcare provider if you have any questions about contraceptive methods that might be right for you  How do I take PAXLOVID? PAXLOVID consists of 2 medicines: nirmatrelvir and ritonavir  - Take 2 pink tablets of nirmatrelvir with 1 white tablet of ritonavir by mouth 2 times each day (in the morning and in the evening) for 5 days  For each dose, take all 3 tablets at the same time  - If you have kidney disease, talk to your healthcare provider  You may need a different dose  - Swallow the tablets whole  Do not chew, break, or crush the tablets  - Take PAXLOVID with or without food  - Do not stop taking PAXLOVID without talking to your healthcare provider, even if you feel better  - If you miss a dose of PAXLOVID within 8 hours of the time it is usually taken, take it as soon as you remember  If you miss a dose by more than 8 hours, skip the missed dose and take the next dose at your regular time   Do not take 2 doses of PAXLOVID at the same time  - If you take too much PAXLOVID, call your healthcare provider or go to the nearest hospital emergency room right away  - If you are taking a ritonavir- or cobicistat-containing medicine to treat hepatitis C or Human Immunodeficiency Virus (HIV), you should continue to take your medicine as prescribed by your healthcare provider   - Talk to your healthcare provider if you do not feel better or if you feel worse after 5 days  Who should generally not take PAXLOVID? Do not take PAXLOVID if:  You are allergic to nirmatrelvir, ritonavir, or any of the ingredients in PAXLOVID  You are taking any of the following medicines:  - Alfuzosin  - Pethidine, piroxicam, propoxyphene  - Ranolazine  - Amiodarone, dronedarone, flecainide, propafenone, quinidine  - Colchicine  - Lurasidone, pimozide, clozapine  - Dihydroergotamine, ergotamine, methylergonovine  - Lovastatin, simvastatin  - Sildenafil (Revatio®) for pulmonary arterial hypertension (PAH)  - Triazolam, oral midazolam  - Apalutamide  - Carbamazepine, phenobarbital, phenytoin  - Rifampin  - St  Omars Wort (hypericum perforatum)    What are the important possible side effects of PAXLOVID? Possible side effects of PAXLOVID are:  - Liver Problems  Tell your healthcare provider right away if you have any of these signs and symptoms of liver problems: loss of appetite, yellowing of your skin and the whites of eyes (jaundice), dark-colored urine, pale colored stools and itchy skin, stomach area (abdominal) pain  - Resistance to HIV Medicines  If you have untreated HIV infection, PAXLOVID may lead to some HIV medicines not working as well in the future  - Other possible side effects include: altered sense of taste, diarrhea, high blood pressure, or muscle aches    These are not all the possible side effects of PAXLOVID  Not many people have taken PAXLOVID  Serious and unexpected side effects may happen   Lynn Vish is still being studied, so it is possible that all of the risks are not known at this time  What other treatment choices are there? Like Hilario Luther may allow for the emergency use of other medicines to treat people with COVID-19  Go to https://[employer]/ for information on the emergency use of other medicines that are authorized by FDA to treat people with COVID-19  Your healthcare provider may talk with you about clinical trials for which you may be eligible  It is your choice to be treated or not to be treated with PAXLOVID  Should you decide not to receive it or for your child not to receive it, it will not change your standard medical care  What if I am pregnant or breastfeeding? There is no experience treating pregnant women or breastfeeding mothers with PAXLOVID  For a mother and unborn baby, the benefit of taking PAXLOVID may be greater than the risk from the treatment  If you are pregnant, discuss your options and specific situation with your healthcare provider  It is recommended that you use effective barrier contraception or do not have sexual activity while taking PAXLOVID  If you are breastfeeding, discuss your options and specific situation with your healthcare provider  How do I report side effects with PAXLOVID? Contact your healthcare provider if you have any side effects that bother you or do not go away  Report side effects to FDA MedWatch at www fda gov/medwatch or call 9-923-BBT9205 or you can report side effects to [facility]  at the contact information provided below  Website Fax number Telephone number   [employer] [phone]     How should I store [address]? Store PAXLOVID tablets at room temperature between 68°F to 77°F (20°C to 25°C)      Full fact sheet for patients, parents, and caregivers can be found at: [employer]     I have spent 20 minutes directly with the patient  Greater than 50% of this time was spent in counseling/coordination of care regarding: prognosis, risks and benefits of treatment options, instructions for management, importance of treatment compliance and risk factor reductions  Encounter provider Milagros Snyder DO    Provider located at [address] [place] [address],[address]  [address]  [facility] [id number] [phone]    Recent Visits  No visits were found meeting these conditions  Showing recent visits within past 7 days and meeting all other requirements  Today's Visits  Date Type Provider Dept   08/05/22 AMB Video Visit  Thomas Richardson DO [facility] today's visits and meeting all other requirements  Future Appointments  No visits were found meeting these conditions  Showing future appointments within next 150 days and meeting all other requirements     This virtual check-in was done via telephone and he agrees to proceed  Patient agrees to participate in a virtual check in via telephone or video visit instead of presenting to the office to address urgent/immediate medical needs  Patient is aware this is a billable service  After connecting through Telephone, the patient was identified by name and date of birth  Filemonnan Martinez was informed that this was a telemedicine visit and that the exam was being conducted confidentially over secure lines  My office door was closed  No one else was in the room  Em Juan acknowledged consent and understanding of privacy and security of the telemedicine visit  I informed the patient that I have reviewed his record in Epic and presented the opportunity for him to ask any questions regarding the visit today  The patient agreed to participate      It was my intent to perform this visit via video technology but the patient was not able to do a video connection so the visit was completed via audio telephone only  Verification of patient location:  Patient is located in the following state in which I hold an active license: [place]    Subjective:   Espinoza Day is a 68 y o  male who has been screened for COVID-19  Patient's symptoms include fatigue, nasal congestion, sore throat and cough  - Date of symptom onset: 8/3/2022  - Date of positive COVID-19 test: 8/5/2022  Type of test: Home antigen  COVID-19 vaccination status: Fully vaccinated with booster    On call   Spoke to pt and wife  Symptoms started 3 days ago  Getting little worse  Test was negative yesterday and positive today   Tylenol for that     Had recent hip replacement       Lab Results   Component Value Date    SARSCOV2 Negative 05/11/2022    SARSCOV2 Not Detected 10/06/2020     Past Medical History:   Diagnosis Date    Abnormal electrocardiography     resolved: 11/21/2017    Abnormal laboratory test     last assessed: 11/16/2016    Acute myocardial infarction of inferior wall ([address] ) 1/20/2015    Anxiety     Community acquired pneumonia     last assessed: 10/15/2015    Complete tear of left rotator cuff     unspecified laterality    Coronary artery disease     Depression     GERD (gastroesophageal reflux disease)     Hyperlipidemia     Hypertension     Impingement syndrome of left shoulder 3/9/2015    Inguinal mass     last assessed: 1/21/2015    Myocardial infarction ([address] )     Nontraumatic rupture of tendons of biceps (long head), left     last assessed: 1/2/2017    Right trigger finger     last assessed: 1/13/2015    Skin lesion     last assessed: 4/21/2016    Skin lesion of cheek     last assessed: 2/25/2016    Thrombocytopenia ([facility] [address] [facility] ) 1/13/2015     Past Surgical History:   Procedure Laterality Date    CAROTID STENT      transcath placement of carotid artery stent    COLONOSCOPY      CORONARY ANGIOPLASTY      SC COLONOSCOPY FLX DX W/COLLJ SPEC WHEN PFRMD N/A 11/3/2017 Procedure: COLONOSCOPY;  Surgeon: Negra Bates MD;  [facility]: [facility]; Service: Colorectal    ROTATOR CUFF REPAIR Bilateral     SHOULDER SURGERY       Current Outpatient Medications   Medication Sig Dispense Refill    aspirin (ECOTRIN LOW STRENGTH) 81 mg EC tablet Take 1 tablet (81 mg total) by mouth 2 (two) times a day      atenolol (TENORMIN) 25 mg tablet Take 1 tablet (25 mg total) by mouth daily 90 tablet 1    benzonatate (TESSALON) 200 MG capsule Take 1 capsule (200 mg total) by mouth 3 (three) times a day as needed for cough 30 capsule 0    celecoxib (CeleBREX) 200 mg capsule Take 1 capsule (200 mg total) by mouth 2 (two) times a day      Coenzyme Q10 200 MG capsule Take 400 mg by mouth daily       ezetimibe (ZETIA) 10 mg tablet Take 0 5 tablets (5 mg total) by mouth daily      losartan-hydrochlorothiazide (HYZAAR) 50-12 5 mg per tablet Take 1 tablet by mouth every morning      nirmatrelvir & ritonavir (Paxlovid) tablet therapy pack Take 3 tablets by mouth 2 (two) times a day for 5 days Take 2 nirmatrelvir tablets + 1 ritonavir tablet together per dose 30 tablet 0    pantoprazole (PROTONIX) 20 mg tablet Take 20 mg by mouth in the morning  Patient cutting in half, taking 10 mg daily  Kellen  PARoxetine (PAXIL) 10 mg tablet Take 10 mg by mouth daily      simvastatin (ZOCOR) 40 mg tablet Take 20 mg by mouth daily at bedtime Patient taking 20 mg, half of a 40 mg tablet   tamsulosin (FLOMAX) 0 4 mg Take 0 4 mg by mouth daily      valsartan-hydrochlorothiazide (DIOVAN-HCT) 160-12 5 MG per tablet TAKE 1 TABLET BY MOUTH EVERY DAY 90 tablet 1    omega-3-acid ethyl esters (LOVAZA) 1 g capsule TAKE 2 CAPSULES (2 G TOTAL) BY MOUTH 2 (TWO) TIMES A DAY (Patient not taking: Reported on 7/12/2022) 360 capsule 3     No current facility-administered medications for this visit       Allergies   Allergen Reactions    Atorvastatin GI Intolerance       Review of Systems   Constitutional: Positive for fatigue  HENT: Positive for congestion and sore throat  Respiratory: Positive for cough  Objective: There were no vitals filed for this visit  Physical Exam    VIRTUAL VISIT [address] verbally agrees to participate in [employer]  Pt is aware that [employer] could be limited without vital signs or the ability to perform a full hands-on physical Erin Laminath understands he or the provider may request at any time to terminate the video visit and request the patient to seek care or treatment in person

## 2022-08-06 NOTE — TELEPHONE ENCOUNTER
Regarding: low blood pressure   ----- Message from Ilir Castillo sent at 8/6/2022  4:59 PM EDT -----  "we spoke to the doctor on call last night, she put him on paxlovid and told us to take his blood pressire because of his issues   it is extremely low it is 89/54, earlier it was 70/40"

## 2022-08-06 NOTE — TELEPHONE ENCOUNTER
Reason for Disposition   HIGH RISK for severe COVID complications (e g , weak immune system, age > 59 years, obesity with BMI > 22, pregnant, chronic lung disease or other chronic medical condition)  (Exception: Already seen by PCP and no new or worsening symptoms )    Answer Assessment - Initial Assessment Questions  1  COVID-19 DIAGNOSIS: "Who made your COVID-19 diagnosis?" "Was it confirmed by a positive lab test or self-test?" If not diagnosed by a doctor (or NP/PA), ask "Are there lots of cases (community spread) where you live?" Note: See public health department website, if unsure  Home test  2  COVID-19 EXPOSURE: "Was there any known exposure to COVID before the symptoms began?" CDC Definition of close contact: within 6 feet (2 meters) for a total of 15 minutes or more over a 24-hour period  Not sure   3  ONSET: "When did the COVID-19 symptoms start?"       Thursday  4  WORST SYMPTOM: "What is your worst symptom?" (e g , cough, fever, shortness of breath, muscle aches)      Cough   5  COUGH: "Do you have a cough?" If Yes, ask: "How bad is the cough?"        mild  6  FEVER: "Do you have a fever?" If Yes, ask: "What is your temperature, how was it measured, and when did it start?"      no  7  RESPIRATORY STATUS: "Describe your breathing?" (e g , shortness of breath, wheezing, unable to speak)       WNL  8  BETTER-SAME-WORSE: "Are you getting better, staying the same or getting worse compared to yesterday?"  If getting worse, ask, "In what way?"      Worse   9  HIGH RISK DISEASE: "Do you have any chronic medical problems?" (e g , asthma, heart or lung disease, weak immune system, obesity, etc )      CAD  10  VACCINE: "Have you had the COVID-19 vaccine?" If Yes, ask: "Which one, how many shots, when did you get it?"        Yes 2   11  BOOSTER: "Have you received your COVID-19 booster?" If Yes, ask: "Which one and when did you get it?"        1   12   PREGNANCY: "Is there any chance you are pregnant?" "When was your last menstrual period?"        n/a  13  OTHER SYMPTOMS: "Do you have any other symptoms?"  (e g , chills, fatigue, headache, loss of smell or taste, muscle pain, sore throat)        Sore Throat   14   O2 SATURATION MONITOR:  "Do you use an oxygen saturation monitor (pulse oximeter) at home?" If Yes, ask "What is your reading (oxygen level) today?" "What is your usual oxygen saturation reading?" (e g , 95%)        N/A    Protocols used: CORONAVIRUS (COVID-19) DIAGNOSED OR SUSPECTED-ADULT-

## 2022-08-06 NOTE — TELEPHONE ENCOUNTER
Reason for Disposition   [0] Fall in systolic BP > 20 mm Hg from normal AND [2] dizzy, lightheaded, or weak    Answer Assessment - Initial Assessment Questions  1  BLOOD PRESSURE: "What is the blood pressure?" "Did you take at least two measurements 5 minutes apart?"      89/54 earlier and 70/40  BP right now 110/38 but still feels dizzy and shaky  2  ONSET: "When did you take your blood pressure?"      Today    3  HOW: "How did you obtain the blood pressure?" (e g , visiting nurse, automatic home BP monitor)      Delio and electric BP    4  HISTORY: "Do you have a history of low blood pressure?" "What is your blood pressure normally?"      No hx of hypertension    5  MEDICATIONS: "Are you taking any medications for blood pressure?" If Yes, ask: "Have they been changed recently?"      Atenolol      Losartan-hctz    6  PULSE RATE: "Do you know what your pulse rate is?"       N/a    7  OTHER SYMPTOMS: "Have you been sick recently?" "Have you had a recent injury?"      Shaky and lightheadedness   BP usually ranges at 130/70    Protocols used: BLOOD PRESSURE - LOW-ADULT-

## 2022-08-06 NOTE — TELEPHONE ENCOUNTER
Regarding: [facility]-Covid positive/ paxlovid  ----- Message from Reba Vigil sent at 8/5/2022  8:34 PM EDT -----  Pt's spouse called, " my  tested positive for covid and he is a high risk   Can get the paxlovid "

## 2022-08-09 DIAGNOSIS — I10 ESSENTIAL HYPERTENSION: Primary | ICD-10-CM

## 2022-08-09 NOTE — TELEPHONE ENCOUNTER
Call I reviewed his chart  He was recently diagnosed with Cov and was prescribed Paxlovid  He had a total hip replacement last month  I would hold his BP medications for now and check his BP twice a day for the next several days  Push fluids   I would recheck labs CBC and BMP

## 2022-08-10 NOTE — TELEPHONE ENCOUNTER
Spoke with patient, he said bp is fine now  I instructed him to monitor bp at home for few days anyway but I did not tell him to dc bp meds  Should he get labs as previously instructed? Also he had dc'ed his lovaza prior to his hip surgery  Can he resume that yet?

## 2022-08-10 NOTE — TELEPHONE ENCOUNTER
Call he can continue with his current BP medications and monitor his BP  He can restart Lovaza  There are orders in Epic for a CBC and BMP  I would get labs done

## 2022-10-14 ENCOUNTER — APPOINTMENT (OUTPATIENT)
Dept: LAB | Facility: CLINIC | Age: 77
End: 2022-10-14
Payer: COMMERCIAL

## 2022-10-14 DIAGNOSIS — I10 ESSENTIAL HYPERTENSION: ICD-10-CM

## 2022-10-14 LAB
ANION GAP SERPL CALCULATED.3IONS-SCNC: 8 MMOL/L (ref 4–13)
BASOPHILS # BLD AUTO: 0.07 THOUSANDS/ΜL (ref 0–0.1)
BASOPHILS NFR BLD AUTO: 1 % (ref 0–1)
BUN SERPL-MCNC: 14 MG/DL (ref 5–25)
CALCIUM SERPL-MCNC: 9.2 MG/DL (ref 8.3–10.1)
CHLORIDE SERPL-SCNC: 104 MMOL/L (ref 96–108)
CO2 SERPL-SCNC: 26 MMOL/L (ref 21–32)
CREAT SERPL-MCNC: 1.04 MG/DL (ref 0.6–1.3)
EOSINOPHIL # BLD AUTO: 0.66 THOUSAND/ΜL (ref 0–0.61)
EOSINOPHIL NFR BLD AUTO: 10 % (ref 0–6)
ERYTHROCYTE [DISTWIDTH] IN BLOOD BY AUTOMATED COUNT: 12.6 % (ref 11.6–15.1)
GFR SERPL CREATININE-BSD FRML MDRD: 68 ML/MIN/1.73SQ M
GLUCOSE P FAST SERPL-MCNC: 123 MG/DL (ref 65–99)
HCT VFR BLD AUTO: 42.3 % (ref 36.5–49.3)
HGB BLD-MCNC: 13.9 G/DL (ref 12–17)
IMM GRANULOCYTES # BLD AUTO: 0.02 THOUSAND/UL (ref 0–0.2)
IMM GRANULOCYTES NFR BLD AUTO: 0 % (ref 0–2)
LYMPHOCYTES # BLD AUTO: 1.69 THOUSANDS/ΜL (ref 0.6–4.47)
LYMPHOCYTES NFR BLD AUTO: 27 % (ref 14–44)
MCH RBC QN AUTO: 33 PG (ref 26.8–34.3)
MCHC RBC AUTO-ENTMCNC: 32.9 G/DL (ref 31.4–37.4)
MCV RBC AUTO: 101 FL (ref 82–98)
MONOCYTES # BLD AUTO: 0.42 THOUSAND/ΜL (ref 0.17–1.22)
MONOCYTES NFR BLD AUTO: 7 % (ref 4–12)
NEUTROPHILS # BLD AUTO: 3.47 THOUSANDS/ΜL (ref 1.85–7.62)
NEUTS SEG NFR BLD AUTO: 55 % (ref 43–75)
NRBC BLD AUTO-RTO: 0 /100 WBCS
PLATELET # BLD AUTO: 181 THOUSANDS/UL (ref 149–390)
PMV BLD AUTO: 9.6 FL (ref 8.9–12.7)
POTASSIUM SERPL-SCNC: 3.8 MMOL/L (ref 3.5–5.3)
RBC # BLD AUTO: 4.21 MILLION/UL (ref 3.88–5.62)
SODIUM SERPL-SCNC: 138 MMOL/L (ref 135–147)
WBC # BLD AUTO: 6.33 THOUSAND/UL (ref 4.31–10.16)

## 2022-10-14 PROCEDURE — 85025 COMPLETE CBC W/AUTO DIFF WBC: CPT | Performed by: FAMILY MEDICINE

## 2022-10-14 PROCEDURE — 36415 COLL VENOUS BLD VENIPUNCTURE: CPT | Performed by: FAMILY MEDICINE

## 2022-10-14 PROCEDURE — 80048 BASIC METABOLIC PNL TOTAL CA: CPT

## 2022-10-20 ENCOUNTER — OFFICE VISIT (OUTPATIENT)
Dept: FAMILY MEDICINE CLINIC | Facility: CLINIC | Age: 77
End: 2022-10-20
Payer: COMMERCIAL

## 2022-10-20 VITALS
BODY MASS INDEX: 32.74 KG/M2 | SYSTOLIC BLOOD PRESSURE: 140 MMHG | WEIGHT: 216 LBS | TEMPERATURE: 96.2 F | HEIGHT: 68 IN | DIASTOLIC BLOOD PRESSURE: 90 MMHG | OXYGEN SATURATION: 96 % | HEART RATE: 70 BPM

## 2022-10-20 DIAGNOSIS — R05.8 UPPER AIRWAY COUGH SYNDROME: Primary | ICD-10-CM

## 2022-10-20 PROCEDURE — 99213 OFFICE O/P EST LOW 20 MIN: CPT | Performed by: FAMILY MEDICINE

## 2022-10-20 RX ORDER — GUAIFENESIN 600 MG/1
1200 TABLET, EXTENDED RELEASE ORAL EVERY 12 HOURS SCHEDULED
Qty: 40 TABLET | Refills: 0 | Status: SHIPPED | OUTPATIENT
Start: 2022-10-20 | End: 2022-10-30

## 2022-10-20 RX ORDER — FLUTICASONE PROPIONATE 50 MCG
2 SPRAY, SUSPENSION (ML) NASAL DAILY
Qty: 9.9 G | Refills: 0 | Status: SHIPPED | OUTPATIENT
Start: 2022-10-20

## 2022-10-20 NOTE — PROGRESS NOTES
Name: Deb Babcock      : 1945      MRN: [id number]  Encounter Provider: Bev Caruso MD  Encounter Date: 10/20/2022   Encounter department: [facility]     1  Upper airway cough syndrome  -     fluticasone (FLONASE) 50 mcg/act nasal spray; 2 sprays into each nostril daily  -     guaiFENesin (MUCINEX) 600 mg 12 hr tablet; Take 2 tablets (1,200 mg total) by mouth every 12 (twelve) hours for 10 days  Start medications above  No improvement 2 weeks will consider ordering chest x-ray  Follow-up in 4 weeks for annual physical       Subjective      Patient presents today with 2 weeks of cough and congestion worse in the morning  Negative for COVID  Has not taken any medications at this time  Patient did have COVID in 2022  Reports waking up with Ace sore throat and overall sensation of congestion  Is coughing up some phlegm throughout the day  Denies any shortness of breath, chest pain or fevers  Review of Systems   Constitutional: Negative for fatigue and fever  HENT: Positive for congestion, postnasal drip, sinus pressure and sore throat  Respiratory: Positive for cough  Negative for wheezing and stridor  Cardiovascular: Negative for chest pain  Current Outpatient Medications on File Prior to Visit   Medication Sig   • aspirin (ECOTRIN LOW STRENGTH) 81 mg EC tablet Take 1 tablet (81 mg total) by mouth 2 (two) times a day   • atenolol (TENORMIN) 25 mg tablet Take 1 tablet (25 mg total) by mouth daily   • celecoxib (CeleBREX) 200 mg capsule Take 1 capsule (200 mg total) by mouth 2 (two) times a day   • Coenzyme Q10 200 MG capsule Take 400 mg by mouth daily    • ezetimibe (ZETIA) 10 mg tablet Take 0 5 tablets (5 mg total) by mouth daily   • omega-3-acid ethyl esters (LOVAZA) 1 g capsule TAKE 2 CAPSULES (2 G TOTAL) BY MOUTH 2 (TWO) TIMES A DAY   • pantoprazole (PROTONIX) 20 mg tablet Take 20 mg by mouth in the morning   Patient cutting in half, taking 10 mg daily      • PARoxetine (PAXIL) 10 mg tablet Take 10 mg by mouth daily   • simvastatin (ZOCOR) 40 mg tablet Take 20 mg by mouth daily at bedtime Patient taking 20 mg, half of a 40 mg tablet  • valsartan-hydrochlorothiazide (DIOVAN-HCT) 160-12 5 MG per tablet TAKE 1 TABLET BY MOUTH EVERY DAY   • benzonatate (TESSALON) 200 MG capsule Take 1 capsule (200 mg total) by mouth 3 (three) times a day as needed for cough (Patient not taking: Reported on 10/20/2022)   • losartan-hydrochlorothiazide (HYZAAR) 50-12 5 mg per tablet Take 1 tablet by mouth every morning (Patient not taking: Reported on 10/20/2022)   • tamsulosin (FLOMAX) 0 4 mg Take 0 4 mg by mouth daily       Objective     /90 (BP Location: Left arm, Patient Position: Sitting, Cuff Size: Standard)   Pulse 70   Temp (!) 96 2 °F (35 7 °C)   Ht 5' 8" (1 727 m)   Wt 98 kg (216 lb)   SpO2 96%   BMI 32 84 kg/m²     Physical Exam  Vitals and nursing note reviewed  Constitutional:       Appearance: He is well-developed  HENT:      Head: Normocephalic and atraumatic  Nose: Congestion present  Mouth/Throat:      Pharynx: Posterior oropharyngeal erythema present  No oropharyngeal exudate  Cardiovascular:      Rate and Rhythm: Normal rate and regular rhythm  Pulses: Normal pulses  Pulmonary:      Effort: Pulmonary effort is normal  No respiratory distress  Breath sounds: Normal breath sounds  No stridor  No wheezing or rales  Chest:      Chest wall: No tenderness  Musculoskeletal:      Cervical back: No tenderness  Lymphadenopathy:      Cervical: No cervical adenopathy  Neurological:      General: No focal deficit present  Mental Status: He is alert     Psychiatric:         Mood and Affect: Mood normal          Behavior: Behavior normal        Ray Kumar MD

## 2022-10-24 ENCOUNTER — TELEPHONE (OUTPATIENT)
Dept: CARDIOLOGY CLINIC | Facility: CLINIC | Age: 77
End: 2022-10-24

## 2022-10-24 NOTE — TELEPHONE ENCOUNTER
P/c'd , he had lipids done March of 2022  He also had Bmp on 10/14/22 and a CMP done 6/30/22  He would like more blood work before seeing you on 11/3/22  Do you want any  Labs ordered?       Please advise

## 2022-10-25 NOTE — PROGRESS NOTES
Cardiology Follow Up    Vero Wagner  1945  [id number]  [address] [facility] [address]  [phone]    1  Essential hypertension     2  Coronary arteriosclerosis     3  Pure hypercholesterolemia         Interval History:  Patient is here for a follow-up visit and clearance for hip surgery  He had PCI in the setting of IWMI 2/2004  Repeat catheterization July 2004 showed patent stents   Echocardiogram done January 2021 demonstrated preserved LV systolic function and no significant valvular heart disease   Pharmacologic nuclear stress test done February 2021 demonstrated no evidence of ischemia   The LVEF was 68%   Lipid profile done 3/2022 looked good  His vital signs are stable today  Patient has had atypical left pectoral discomfort while driving  When he is physically active he has no symptoms  The discomfort is unlike his prior angina      Patient Active Problem List   Diagnosis   • CAD (coronary atherosclerotic disease)   • Hyperlipidemia   • Hypertension   • Bilateral carotid artery disease (HCC)   • ARIELLE (generalized anxiety disorder)   • Impaired fasting glucose   • Benign colon polyp   • Diverticulosis   • Dupuytren's contracture   • GERD without esophagitis   • Benign prostatic hyperplasia with nocturia   • Depressive disorder   • Right rotator cuff tear     Past Medical History:   Diagnosis Date   • Abnormal electrocardiography     resolved: 11/21/2017   • Abnormal laboratory test     last assessed: 11/16/2016   • Acute myocardial infarction of inferior wall ([facility] [address] ) 1/20/2015   • Anxiety    • Community acquired pneumonia     last assessed: 10/15/2015   • Complete tear of left rotator cuff     unspecified laterality   • Coronary artery disease    • Depression    • GERD (gastroesophageal reflux disease)    • Hyperlipidemia    • Hypertension    • Impingement syndrome of left shoulder 3/9/2015 • Inguinal mass     last assessed: 2015   • Myocardial infarction [facility])    • Nontraumatic rupture of tendons of biceps (long head), left     last assessed: 2017   • Right trigger finger     last assessed: 2015   • Skin lesion     last assessed: 2016   • Skin lesion of cheek     last assessed: 2016   • Thrombocytopenia ([address] ) 2015     Social History     Socioeconomic History   • Marital status: /Civil Union     Spouse name: Not on file   • Number of children: Not on file   • Years of education: Not on file   • Highest education level: Not on file   Occupational History   • Not on file   Tobacco Use   • Smoking status: Former Smoker     Packs/day: 1 00     Years: 20 00     Pack years: 20 00     Quit date: 1     Years since quittin 8   • Smokeless tobacco: Never Used   Substance and Sexual Activity   • Alcohol use: Yes   • Drug use: No   • Sexual activity: Not on file   Other Topics Concern   • Not on file   Social History Narrative   • Not on file     Social Determinants of Health     Financial Resource Strain: Not on file   Food Insecurity: Not on file   Transportation Needs: Not on file   Physical Activity: Not on file   Stress: Not on file   Social Connections: Not on file   Intimate Partner Violence: Not on file   Housing Stability: Not on file      Family History   Problem Relation Age of Onset   • Other Mother         brain tumor   • Coronary artery disease Mother    • No Known Problems Father      Past Surgical History:   Procedure Laterality Date   • CAROTID STENT      transcath placement of carotid artery stent   • COLONOSCOPY     • CORONARY ANGIOPLASTY     • TN COLONOSCOPY FLX DX W/COLLJ SPEC WHEN PFRMD N/A 11/3/2017    Procedure: COLONOSCOPY;  Surgeon: Frieda Jc MD;  Location: [facility]  GI LAB;   Service: Colorectal   • ROTATOR CUFF REPAIR Bilateral    • SHOULDER SURGERY         Current Outpatient Medications:   •  aspirin (ECOTRIN LOW STRENGTH) 81 mg EC tablet, Take 1 tablet (81 mg total) by mouth 2 (two) times a day, Disp: , Rfl:   •  atenolol (TENORMIN) 25 mg tablet, Take 1 tablet (25 mg total) by mouth daily, Disp: 90 tablet, Rfl: 1  •  celecoxib (CeleBREX) 200 mg capsule, Take 1 capsule (200 mg total) by mouth 2 (two) times a day, Disp: , Rfl:   •  Coenzyme Q10 200 MG capsule, Take 400 mg by mouth daily , Disp: , Rfl:   •  ezetimibe (ZETIA) 10 mg tablet, Take 0 5 tablets (5 mg total) by mouth daily, Disp: , Rfl:   •  fluticasone (FLONASE) 50 mcg/act nasal spray, 2 sprays into each nostril daily, Disp: 9 9 g, Rfl: 0  •  omega-3-acid ethyl esters (LOVAZA) 1 g capsule, TAKE 2 CAPSULES (2 G TOTAL) BY MOUTH 2 (TWO) TIMES A DAY, Disp: 360 capsule, Rfl: 3  •  pantoprazole (PROTONIX) 20 mg tablet, Take 20 mg by mouth in the morning  Patient cutting in half, taking 10 mg daily   , Disp: , Rfl:   •  PARoxetine (PAXIL) 10 mg tablet, Take 10 mg by mouth daily, Disp: , Rfl:   •  simvastatin (ZOCOR) 40 mg tablet, Take 20 mg by mouth daily at bedtime Patient taking 20 mg, half of a 40 mg tablet , Disp: , Rfl:   •  tamsulosin (FLOMAX) 0 4 mg, Take 0 4 mg by mouth daily, Disp: , Rfl:   •  valsartan-hydrochlorothiazide (DIOVAN-HCT) 160-12 5 MG per tablet, TAKE 1 TABLET BY MOUTH EVERY DAY, Disp: 90 tablet, Rfl: 1  •  benzonatate (TESSALON) 200 MG capsule, Take 1 capsule (200 mg total) by mouth 3 (three) times a day as needed for cough (Patient not taking: No sig reported), Disp: 30 capsule, Rfl: 0  •  losartan-hydrochlorothiazide (HYZAAR) 50-12 5 mg per tablet, Take 1 tablet by mouth every morning (Patient not taking: No sig reported), Disp: , Rfl:   Allergies   Allergen Reactions   • Atorvastatin GI Intolerance       Labs:not applicable  Imaging: No results found  Review of Systems:  Review of Systems   All other systems reviewed and are negative        Physical Exam:  /70 (BP Location: Right arm, Patient Position: Sitting, Cuff Size: Extra-Large)   Pulse (!) 52   Wt 95 8 kg (211 lb 4 8 oz)   SpO2 96%   BMI 32 13 kg/m²   Physical Exam  Vitals reviewed  Constitutional:       Appearance: He is well-developed  HENT:      Head: Normocephalic and atraumatic  Eyes:      Conjunctiva/sclera: Conjunctivae normal       Pupils: Pupils are equal, round, and reactive to light  Cardiovascular:      Rate and Rhythm: Normal rate  Heart sounds: Normal heart sounds  Pulmonary:      Effort: Pulmonary effort is normal       Breath sounds: Normal breath sounds  Musculoskeletal:      Cervical back: Normal range of motion and neck supple  Skin:     General: Skin is warm and dry  Neurological:      Mental Status: He is alert and oriented to person, place, and time  Discussion/Summary: I will continue the patient's present medical regimen  The patient appears well compensated  I have asked the patient to call if there is a problem in the interim otherwise I will see the patient in six months time

## 2022-11-03 ENCOUNTER — OFFICE VISIT (OUTPATIENT)
Dept: CARDIOLOGY CLINIC | Facility: CLINIC | Age: 77
End: 2022-11-03

## 2022-11-03 VITALS
HEART RATE: 52 BPM | WEIGHT: 211.3 LBS | DIASTOLIC BLOOD PRESSURE: 70 MMHG | BODY MASS INDEX: 32.13 KG/M2 | SYSTOLIC BLOOD PRESSURE: 108 MMHG | OXYGEN SATURATION: 96 %

## 2022-11-03 DIAGNOSIS — I10 ESSENTIAL HYPERTENSION: Primary | ICD-10-CM

## 2022-11-03 DIAGNOSIS — I25.10 CORONARY ARTERIOSCLEROSIS: ICD-10-CM

## 2022-11-03 DIAGNOSIS — E78.00 PURE HYPERCHOLESTEROLEMIA: ICD-10-CM

## 2022-11-11 DIAGNOSIS — R05.8 UPPER AIRWAY COUGH SYNDROME: ICD-10-CM

## 2022-11-11 RX ORDER — FLUTICASONE PROPIONATE 50 MCG
SPRAY, SUSPENSION (ML) NASAL
Qty: 48 ML | Refills: 1 | Status: SHIPPED | OUTPATIENT
Start: 2022-11-11

## 2022-11-17 ENCOUNTER — OFFICE VISIT (OUTPATIENT)
Dept: FAMILY MEDICINE CLINIC | Facility: CLINIC | Age: 77
End: 2022-11-17

## 2022-11-17 VITALS
SYSTOLIC BLOOD PRESSURE: 110 MMHG | HEART RATE: 70 BPM | DIASTOLIC BLOOD PRESSURE: 70 MMHG | BODY MASS INDEX: 32.05 KG/M2 | HEIGHT: 68 IN | WEIGHT: 211.5 LBS | OXYGEN SATURATION: 96 % | TEMPERATURE: 97.4 F

## 2022-11-17 DIAGNOSIS — Z00.00 ANNUAL PHYSICAL EXAM: Primary | ICD-10-CM

## 2022-11-17 DIAGNOSIS — Z91.89 AT RISK FOR SLEEP APNEA: ICD-10-CM

## 2022-11-17 DIAGNOSIS — R07.9 CHEST PAIN AT REST: ICD-10-CM

## 2022-11-17 RX ORDER — AMOXICILLIN 500 MG/1
CAPSULE ORAL
COMMUNITY
Start: 2022-10-20

## 2022-11-17 NOTE — PROGRESS NOTES
ADULT ANNUAL  [address]    NAME: Rupert Mayes  AGE: 68 y o  SEX: male  : 1945     DATE: 2022     Assessment and Plan:     Problem List Items Addressed This Visit    None  Visit Diagnoses     Annual physical exam    -  Primary    Chest pain at rest        Relevant Orders    NM myocardial perfusion spect (rx stress and/or rest)    CBC and differential    Comprehensive metabolic panel    At risk for sleep apnea        Relevant Orders    Ambulatory Referral to Sleep Medicine        Unknown origin of chest pain  Based on patient's symptoms it could be typical versus atypical   Patient would like a stress test   Stress test ordered  Orders above    Immunizations and preventive care screenings were discussed with patient today  Appropriate education was printed on patient's after visit summary  Counseling:  Alcohol/drug use: discussed moderation in alcohol intake, the recommendations for healthy alcohol use, and avoidance of illicit drug use  Dental Health: discussed importance of regular tooth brushing, flossing, and dental visits  Injury prevention: discussed safety/seat belts, safety helmets, smoke detectors, carbon dioxide detectors, and smoking near bedding or upholstery  Sexual health: discussed sexually transmitted diseases, partner selection, use of condoms, avoidance of unintended pregnancy, and contraceptive alternatives  Exercise: the importance of regular exercise/physical activity was discussed  Recommend exercise 3-5 times per week for at least 30 minutes  BMI Counseling: Body mass index is 32 16 kg/m²  The BMI is above normal  Nutrition recommendations include decreasing portion sizes, reducing intake of saturated and trans fat and reducing intake of cholesterol  Exercise recommendations include moderate physical activity 150 minutes/week  No pharmacotherapy was ordered  Patient referred to PCP   Rationale for BMI follow-up plan is due to patient being overweight or obese  Return in about 3 months (around 2/17/2023) for Recheck  Chief Complaint:     Chief Complaint   Patient presents with   • Physical Exam      History of Present Illness:     Adult Annual Physical   Patient here for a comprehensive physical exam  The patient reports problems - Chest tightness   Describes squeezing heaviness that is more discomfor than pain  Can last anywhere from 1-5 minutes Daily   Notices it the most when he is driving  No shortness of breath  Denies N/V Nontender to touch  Pain does not radiate anywhere  Diet and Physical Activity  Diet/Nutrition: high fat diet and limited fruits/vegetables  Exercise: walking  Depression Screening  PHQ-2/9 Depression Screening         General Health  Sleep: sleeps well and snores loudly  Hearing: decreased - bilateral   Difficulty with crowded areas  Vision: no vision problems  Dental: partial plate  regular visits    Health  Symptoms include: urinary frequency     Review of Systems:     Review of Systems   Constitutional: Negative for fatigue and fever  HENT: Negative for sore throat  Eyes: Negative for visual disturbance  Respiratory: Positive for chest tightness  Negative for cough and shortness of breath  Cardiovascular: Positive for chest pain  Negative for palpitations and leg swelling  Gastrointestinal: Negative for abdominal pain, constipation, diarrhea and nausea  Endocrine: Negative for cold intolerance and heat intolerance  Genitourinary: Negative for flank pain  Musculoskeletal: Negative for back pain and neck pain  Skin: Negative for rash  Neurological: Negative for headaches  Psychiatric/Behavioral: Negative for behavioral problems and confusion        Past Medical History:     Past Medical History:   Diagnosis Date   • Abnormal electrocardiography     resolved: 11/21/2017   • Abnormal laboratory test     last assessed: 2016   • Acute myocardial infarction of inferior wall ([facility] [address] ) 2015   • Anxiety    • Community acquired pneumonia     last assessed: 10/15/2015   • Complete tear of left rotator cuff     unspecified laterality   • Coronary artery disease    • Depression    • GERD (gastroesophageal reflux disease)    • Hyperlipidemia    • Hypertension    • Impingement syndrome of left shoulder 3/9/2015   • Inguinal mass     last assessed: 2015   • Myocardial infarction [facility])    • Nontraumatic rupture of tendons of biceps (long head), left     last assessed: 2017   • Right trigger finger     last assessed: 2015   • Skin lesion     last assessed: 2016   • Skin lesion of cheek     last assessed: 2016   • Thrombocytopenia ([facility] [address] ) 2015      Past Surgical History:     Past Surgical History:   Procedure Laterality Date   • CAROTID STENT      transcath placement of carotid artery stent   • COLONOSCOPY     • CORONARY ANGIOPLASTY     • ND COLONOSCOPY FLX DX W/COLLJ SPEC WHEN PFRMD N/A 11/3/2017    Procedure: COLONOSCOPY;  Surgeon: Kendrick Ahumada, MD;  Location: [facility]  GI LAB; Service: Colorectal   • ROTATOR CUFF REPAIR Bilateral    • SHOULDER SURGERY        Family History:     Family History   Problem Relation Age of Onset   • Other Mother         brain tumor   • Coronary artery disease Mother    • No Known Problems Father       Social History:     Social History     Socioeconomic History   • Marital status: /Civil Union     Spouse name: None   • Number of children: None   • Years of education: None   • Highest education level: None   Occupational History   • None   Tobacco Use   • Smoking status: Former     Packs/day: 1 00     Years: 20 00     Pack years: 20 00     Types: Cigarettes     Quit date:      Years since quittin 9   • Smokeless tobacco: Never   Substance and Sexual Activity   • Alcohol use:  Yes   • Drug use: No   • Sexual activity: None   Other Topics Concern   • None   Social History Narrative   • None     Social Determinants of Health     Financial Resource Strain: Not on file   Food Insecurity: Not on file   Transportation Needs: Not on file   Physical Activity: Not on file   Stress: Not on file   Social Connections: Not on file   Intimate Partner Violence: Not on file   Housing Stability: Not on file      Current Medications:     Current Outpatient Medications   Medication Sig Dispense Refill   • aspirin (ECOTRIN LOW STRENGTH) 81 mg EC tablet Take 1 tablet (81 mg total) by mouth 2 (two) times a day (Patient taking differently: Take 81 mg by mouth daily)     • atenolol (TENORMIN) 25 mg tablet Take 1 tablet (25 mg total) by mouth daily (Patient taking differently: Take 25 mg by mouth daily after dinner) 90 tablet 1   • ezetimibe (ZETIA) 10 mg tablet Take 0 5 tablets (5 mg total) by mouth daily     • hydrochlorothiazide (HYDRODIURIL) 12 5 mg tablet Take 12 5 mg by mouth daily     • losartan (COZAAR) 50 mg tablet Take 50 mg by mouth daily     • omega-3-acid ethyl esters (LOVAZA) 1 g capsule TAKE 2 CAPSULES (2 G TOTAL) BY MOUTH 2 (TWO) TIMES A  capsule 3   • pantoprazole (PROTONIX) 40 mg tablet Take 40 mg by mouth daily     • PARoxetine (PAXIL) 20 mg tablet Take 10 mg by mouth daily     • simvastatin (ZOCOR) 40 mg tablet Take 20 mg by mouth daily at bedtime Patient taking 20 mg, half of a 40 mg tablet       • tamsulosin (FLOMAX) 0 4 mg Take 0 4 mg by mouth daily     • amoxicillin (AMOXIL) 500 mg capsule TAKE 4 CAPSULES BY MOUTH 1 HOUR BEFORE DENTIST (Patient not taking: Reported on 11/17/2022)     • Coenzyme Q10 200 MG capsule Take 400 mg by mouth daily  (Patient not taking: Reported on 11/18/2022)     • fluticasone (FLONASE) 50 mcg/act nasal spray SPRAY 2 SPRAYS INTO EACH NOSTRIL EVERY DAY (Patient not taking: Reported on 11/18/2022) 48 mL 1   • losartan-hydrochlorothiazide (HYZAAR) 50-12 5 mg per tablet Take 1 tablet by mouth every morning (Patient not taking: Reported on 11/17/2022)     • valsartan-hydrochlorothiazide (DIOVAN-HCT) 160-12 5 MG per tablet TAKE 1 TABLET BY MOUTH EVERY DAY (Patient not taking: Reported on 11/18/2022) 90 tablet 1     No current facility-administered medications for this visit  Allergies: Allergies   Allergen Reactions   • Atorvastatin GI Intolerance      Physical Exam:     /70 (BP Location: Left arm, Patient Position: Sitting, Cuff Size: Large)   Pulse 70   Temp (!) 97 4 °F (36 3 °C)   Ht 5' 8" (1 727 m)   Wt 95 9 kg (211 lb 8 oz)   SpO2 96%   BMI 32 16 kg/m²     Physical Exam  Vitals and nursing note reviewed  Constitutional:       General: He is not in acute distress  Appearance: Normal appearance  He is well-developed  HENT:      Head: Normocephalic and atraumatic  Right Ear: Tympanic membrane normal  There is no impacted cerumen  Left Ear: Tympanic membrane normal  There is no impacted cerumen  Nose: No congestion  Mouth/Throat:      Mouth: Mucous membranes are moist    Eyes:      Conjunctiva/sclera: Conjunctivae normal       Pupils: Pupils are equal, round, and reactive to light  Cardiovascular:      Rate and Rhythm: Normal rate and regular rhythm  Heart sounds: Normal heart sounds  No murmur heard  Pulmonary:      Effort: Pulmonary effort is normal  No respiratory distress  Breath sounds: Normal breath sounds  Chest:      Chest wall: No tenderness  Abdominal:      General: Bowel sounds are normal       Palpations: Abdomen is soft  Musculoskeletal:         General: Normal range of motion  Cervical back: Normal range of motion  Skin:     General: Skin is warm and dry  Neurological:      General: No focal deficit present  Mental Status: He is alert and oriented to person, place, and time     Psychiatric:         Mood and Affect: Mood normal          Speech: Speech normal          Behavior: Behavior normal           Hiwot Rodriguez MD  [id number]

## 2022-11-18 ENCOUNTER — TELEPHONE (OUTPATIENT)
Dept: FAMILY MEDICINE CLINIC | Facility: CLINIC | Age: 77
End: 2022-11-18

## 2022-11-18 DIAGNOSIS — I25.10 ATHEROSCLEROSIS OF NATIVE CORONARY ARTERY OF NATIVE HEART WITHOUT ANGINA PECTORIS: ICD-10-CM

## 2022-11-18 DIAGNOSIS — I10 ESSENTIAL HYPERTENSION: ICD-10-CM

## 2022-11-18 RX ORDER — LOSARTAN POTASSIUM 50 MG/1
50 TABLET ORAL DAILY
COMMUNITY

## 2022-11-18 RX ORDER — HYDROCHLOROTHIAZIDE 12.5 MG/1
12.5 TABLET ORAL DAILY
COMMUNITY

## 2022-11-18 NOTE — TELEPHONE ENCOUNTER
Updated medication list with patient in chart over the phone and he brought both bottles of BP medication to office for review  He is taking losartan 50mg 1 daily and HCTZ 12 5mg 1 daily  They are both prescribed by [address] doctor

## 2022-11-18 NOTE — TELEPHONE ENCOUNTER
Reviewed patient's meds with him over the phone  He is bringing 2 bottles to the office so I can verify what he is taking  He read the bottles to me and they are both the same he states they both say losartan/hctz but 1 says 50mg 1 says 12  5   he is taking them both  I will update the rest of his meds when he gets here

## 2022-11-21 ENCOUNTER — OFFICE VISIT (OUTPATIENT)
Dept: SLEEP CENTER | Facility: CLINIC | Age: 77
End: 2022-11-21

## 2022-11-21 VITALS
DIASTOLIC BLOOD PRESSURE: 70 MMHG | OXYGEN SATURATION: 96 % | HEART RATE: 82 BPM | WEIGHT: 214.6 LBS | SYSTOLIC BLOOD PRESSURE: 112 MMHG | BODY MASS INDEX: 32.52 KG/M2 | HEIGHT: 68 IN

## 2022-11-21 DIAGNOSIS — G47.9 SLEEP DISTURBANCE: ICD-10-CM

## 2022-11-21 DIAGNOSIS — R40.0 DAYTIME SLEEPINESS: ICD-10-CM

## 2022-11-21 DIAGNOSIS — G47.33 OSA (OBSTRUCTIVE SLEEP APNEA): Primary | ICD-10-CM

## 2022-11-21 DIAGNOSIS — I25.10 ATHEROSCLEROSIS OF NATIVE CORONARY ARTERY OF NATIVE HEART WITHOUT ANGINA PECTORIS: ICD-10-CM

## 2022-11-21 DIAGNOSIS — I10 PRIMARY HYPERTENSION: ICD-10-CM

## 2022-11-21 DIAGNOSIS — E66.9 OBESITY (BMI 30-39.9): ICD-10-CM

## 2022-11-21 DIAGNOSIS — Z91.89 AT RISK FOR SLEEP APNEA: ICD-10-CM

## 2022-11-21 NOTE — PROGRESS NOTES
Review of Systems      Genitourinary need to urinate more than twice a night and difficulty with erection   Cardiology palpitations/fluttering feeling in the chest and ankle/leg swelling   Gastrointestinal none   Neurology need to move extremities, muscle weakness, numbness/tingling of an extremity, difficulty with memory and balance problems   Constitutional claustrophobia and fatigue   Integumentary rash or dry skin   Psychiatry none   Musculoskeletal back pain and sciatica   Pulmonary shortness of breath with activity, chest tightness and frequent cough   ENT throat clearing and ringing in ears   Endocrine excessive thirst and frequent urination   Hematological none

## 2022-11-21 NOTE — PATIENT INSTRUCTIONS
What is CB? Obstructive sleep apnea is a common and serious sleep disorder that causes you to stop breathing during sleep  The airway repeatedly becomes blocked, limiting the amount of air that reaches your lungs  When this happens, you may snore loudly or making choking noises as you try to breathe  Your brain and body becomes oxygen deprived and you may wake up  This may happen a few times a night, or in more severe cases, several hundred times a night  Sleep apnea can make you wake up in the morning feeling tired or unrefreshed even though you have had a full night of sleep  During the day, you may feel fatigued, have difficulty concentrating or you may even unintentionally fall asleep  This is because your body is waking up numerous times throughout the night, even though you might not be conscious of each awakening  The lack of oxygen your body receives can have negative long-term consequences for your health  This includes:  High blood pressure  Heart disease  Irregular heart rhythms  Stroke  Pre-diabetes and diabetes  Depression    Testing  An objective evaluation of your sleep may be needed before your board certified sleep physician can make a diagnosis  Options include:   In-lab overnight sleep study  This type of sleep study requires you to stay overnight at a sleep center, in a bed that may resemble a hotel room  You will sleep with sensors hooked up to various parts of your body  These sensors record your brain waves, heartbeat, breathing and movement  An overnight sleep study also provides your doctor with the most complete information about your sleep  Learn more about an overnight sleep study  Home sleep apnea test  Some patients with high risk factors for obstructive sleep apnea and no other medical disorders may be candidates for a home sleep apnea test  The testing equipment differs in that it is less complicated than what is used in an overnight sleep study   As such, does not give all the data an in-lab will and if negative, may not mean you do not have the problem  Treatment for sleep apnea  includes using a continuous positive airway pressure (CPAP) machine to keep your airway open during sleep  A mask is placed over your nose and mouth, or just your nose  The mask is hooked to the CPAP machine that blows a gentle stream of air into the mask when you breathe  This helps keep your airway open so you can breathe more regularly  Extra oxygen may be given to you through the machine  You may be given a mouth device  It looks like a mouth guard or dental retainer and stops your tongue and mouth tissues from blocking your throat while you sleep  Surgery may be needed to remove extra tissues that block your mouth, throat, or nose  Manage sleep apnea:   Do not smoke  Nicotine and other chemicals in cigarettes and cigars can cause lung damage  Ask your healthcare provider for information if you currently smoke and need help to quit  E-cigarettes or smokeless tobacco still contain nicotine  Talk to your healthcare provider before you use these products  Do not drink alcohol or take sedative medicine before you go to sleep  Alcohol and sedatives can relax the muscles and tissues around your throat  This can block the airflow to your lungs  Maintain a healthy weight  Excess tissue around your throat may restrict your breathing  Ask your healthcare provider for information if you need to lose weight  Sleep on your side or use pillows designed to prevent sleep apnea  This prevents your tongue or other tissues from blocking your throat  You can also raise the head of your bed  Driving Safety  Refrain from driving when drowsy  Follow up with your healthcare provider as directed:  Write down your questions so you remember to ask them during your visits  Go to [employer] website for more information: Sleepeducation  org     What is CB?    Obstructive sleep apnea is a common and serious sleep disorder that causes you to stop breathing during sleep  The airway repeatedly becomes blocked, limiting the amount of air that reaches your lungs  When this happens, you may snore loudly or making choking noises as you try to breathe  Your brain and body becomes oxygen deprived and you may wake up  This may happen a few times a night, or in more severe cases, several hundred times a night  Sleep apnea can make you wake up in the morning feeling tired or unrefreshed even though you have had a full night of sleep  During the day, you may feel fatigued, have difficulty concentrating or you may even unintentionally fall asleep  This is because your body is waking up numerous times throughout the night, even though you might not be conscious of each awakening  The lack of oxygen your body receives can have negative long-term consequences for your health  This includes:  High blood pressure  Heart disease  Irregular heart rhythms  Stroke  Pre-diabetes and diabetes  Depression    Testing  An objective evaluation of your sleep may be needed before your board certified sleep physician can make a diagnosis  Options include:   In-lab overnight sleep study  This type of sleep study requires you to stay overnight at a sleep center, in a bed that may resemble a hotel room  You will sleep with sensors hooked up to various parts of your body  These sensors record your brain waves, heartbeat, breathing and movement  An overnight sleep study also provides your doctor with the most complete information about your sleep  Learn more about an overnight sleep study  Home sleep apnea test  Some patients with high risk factors for obstructive sleep apnea and no other medical disorders may be candidates for a home sleep apnea test  The testing equipment differs in that it is less complicated than what is used in an overnight sleep study   As such, does not give all the data an in-lab will and if negative, may not mean you do not have the problem  Treatment for sleep apnea  includes using a continuous positive airway pressure (CPAP) machine to keep your airway open during sleep  A mask is placed over your nose and mouth, or just your nose  The mask is hooked to the CPAP machine that blows a gentle stream of air into the mask when you breathe  This helps keep your airway open so you can breathe more regularly  Extra oxygen may be given to you through the machine  You may be given a mouth device  It looks like a mouth guard or dental retainer and stops your tongue and mouth tissues from blocking your throat while you sleep  Surgery may be needed to remove extra tissues that block your mouth, throat, or nose  Manage sleep apnea:   Do not smoke  Nicotine and other chemicals in cigarettes and cigars can cause lung damage  Ask your healthcare provider for information if you currently smoke and need help to quit  E-cigarettes or smokeless tobacco still contain nicotine  Talk to your healthcare provider before you use these products  Do not drink alcohol or take sedative medicine before you go to sleep  Alcohol and sedatives can relax the muscles and tissues around your throat  This can block the airflow to your lungs  Maintain a healthy weight  Excess tissue around your throat may restrict your breathing  Ask your healthcare provider for information if you need to lose weight  Sleep on your side or use pillows designed to prevent sleep apnea  This prevents your tongue or other tissues from blocking your throat  You can also raise the head of your bed  Driving Safety  Refrain from driving when drowsy  Follow up with your healthcare provider as directed:  Write down your questions so you remember to ask them during your visits  Go to AAS website for more information: Sleepeducation  org         Nursing Support:  When: Monday through Friday 7A-5PM except holidays  Where: Our direct line is [phone]      If you are having a true emergency please call 911  In the event that the line is busy or it is after hours please leave a voice message and we will return your call  Please speak clearly, leaving your full name, birth date, best number to reach you and the reason for your call  Medication refills: We will need the name of the medication, the dosage, the ordering provider, whether you get a 30 or 90 day refill, and the pharmacy name and address  Medications will be ordered by the provider only  Nurses cannot call in prescriptions  Please allow 7 days for medication refills  Physician requested updates: If your provider requested that you call with an update after starting medication, please be ready to provide us the medication and dosage, what time you take your medication, the time you attempt to fall asleep, time you fall asleep, when you wake up, and what time you get out of bed  Sleep Study Results: We will contact you with sleep study results and/or next steps after the physician has reviewed your testing

## 2022-11-21 NOTE — PROGRESS NOTES
Consultation - Diana 68  68 y o  male  TDN:[id number]  KKY:[id number]  MQT:[id number]    Physician Requesting Consult: Ledy Hernández*             Reason for Consult : At your kind request I saw James Lockhart for initial sleep evaluation today  The patient is here to evaluate for suspected Obstructive Sleep Apnea  PFSH, Problem List, Medications & Allergies were reviewed in EMR  Siddharth Farr  has a past medical history of Abnormal electrocardiography, Abnormal laboratory test, Acute myocardial infarction of inferior wall ([facility] ) (1/20/2015), Anxiety, Community acquired pneumonia, Complete tear of left rotator cuff, Coronary artery disease, Depression, GERD (gastroesophageal reflux disease), Hyperlipidemia, Hypertension, Impingement syndrome of left shoulder (3/9/2015), Inguinal mass, Myocardial infarction [facility]), Nontraumatic rupture of tendons of biceps (long head), left, Right trigger finger, Skin lesion, Skin lesion of cheek, and Thrombocytopenia ([facility] ) (1/13/2015)  He has a current medication list which includes the following prescription(s): ezetimibe, hydrochlorothiazide, losartan, omega-3-acid ethyl esters, pantoprazole, paroxetine, simvastatin, tamsulosin, amoxicillin, aspirin, atenolol, coenzyme q10, and fluticasone  HPI:  He is here because of his risk factors  Wife reports intermittent snoring that he is not aware of but at times awakens himself with snorting  Wife has not reported any breathing difficulties during sleep  Other Complaints: none  Restless Leg Syndrome: reports no suggestive symptoms  Parasomnia: no features reported    Sleep Routine (on average):   Typical Bedtime:  10:00 p m  Gets OOB:  6:00 a m  TIB:8 hrs  Sleep latency:< 15 minutes Sleep Interruptions:4-5/nite because of nocturia and able to fall back asleep  Awakens: before alarm most days  Upon awakening: is not always refreshed   He estimates getting 6-7 hrs sleep   Siddharth Farr denies Excessive Daytime Sleepiness  dozes off when sedentary at home in the evenings while watching TV for approximately 30-60 minute  He rated himself at Total score: 8 /24 on the [place] Sleepiness Scale  Habits:  reports that he quit smoking about 44 years ago  He has a 20 00 pack-year smoking history  He has never used smokeless tobacco  ;   E-Cigarette/Vaping    ;  reports no history of drug use ;  reports current alcohol use  ; Caffeine use:limited ; Exercise routine: sometimes since recovering from hip replacement  Family History: Negative for sleep disturbance  ROS - reviewed and as attached  Significant for weight has been stable  Chikis Tyson EXAM:  /70   Pulse 82   Ht 5' 8" (1 727 m)   Wt 97 3 kg (214 lb 9 6 oz)   SpO2 96%   BMI 32 63 kg/m²    General: Well groomed male, well appearing, in no apparent distress  Neurological: Alert and orientated;  cooperative; Cranial nerves intact;    Psychiatric: Speech:clear and coherent;  Normal mood, affect & thought   Skin: warm and dry; Color& Hydration good; no facial rashes or lesions   HEENT:  Craniofacial anatomy: normal Sinuses: non- tender  TMJ: Normal    Eyes: EOM's intact;  conjunctiva/corneas clear   Ears: Externallyappear normal     Nasal Airway: is patent Septum:intact; Mucosa: normal; Turbinates: normal; Rhinorrhea: None   Mouth: Lips: normal posture; Dentition: missing several  Mucosa:moist  ; Hard Palate:normal    Oropharryx: crowdedTongue: Mallampati:Class IV and MobileSoft Palate:  redundant  and Uvular Hypertrophy Tonsils: absent  Neck:is thick; Neck Circumference: 17 "; Supple; no abnormal masses; Thyroid:normal  Trachea:central     Lymph: No Cervical or Submandibular Lymhadenopathy  Heart: S1,S2 normal; RRR; no gallop; no murmurs   Lungs: Respiratory Effort:normal  Air entry good bilaterally  No wheezes  No rales  Abdomen: Obese, Soft & non-tender    Extremities: No pedal edema  No clubbing or cyanosis      Musculoskeletal:  Motor normal; Gait:normal        IMPRESSION: Primary/Secondary Sleep Diagnoses (to Medical or Psych conditions) & Comorbidities   1  CB (obstructive sleep apnea)  Home Study      2  Sleep disturbance  Home Study      3  Daytime sleepiness        4  At risk for sleep apnea  Ambulatory Referral to Sleep Medicine      5  Primary hypertension        6  Atherosclerosis of native coronary artery of native heart without angina pectoris        7  Obesity (BMI 30-39  9)             PLAN:   With respect to above conditions, comprehensive counseling provided on pathophysiology; effects on symptoms and comorbidities, diagnostic strategies & limitations; treatment options; risks or no treament; risks & benefits of the various therapeutic options; costs and insurance aspects  I advised weight reduction, avoiding sleeping supine, using alcohol or sedating medications close to bed time and on safe driving practices  Nocturnal polysomnography is indicated and a diagnostic study will be scheduled  Patient is willing to try Positive airway pressure therapy and will be scheduled for a titration study if indicated  Follow-up to be scheduled after the studies to review results, further details of treatment options and to initiate/adjust therapy  Sincerely,      Authenticated electronically on 31/76/99   Board Certified Specialist     Portions of the record may have been created with voice recognition software  Occasional wrong word or "sound a like" substitutions may have occurred due to the inherent limitations of voice recognition software  There may also be notations and random deletions of words or characters from malfunctioning software  Read the chart carefully and recognize, using context, where substitutions/deletions have occurred

## 2022-11-28 ENCOUNTER — HOSPITAL ENCOUNTER (OUTPATIENT)
Dept: SLEEP CENTER | Facility: CLINIC | Age: 77
Discharge: HOME | End: 2022-11-28

## 2022-11-28 DIAGNOSIS — G47.9 SLEEP DISTURBANCE: ICD-10-CM

## 2022-11-28 DIAGNOSIS — G47.33 OSA (OBSTRUCTIVE SLEEP APNEA): ICD-10-CM

## 2022-12-01 DIAGNOSIS — G47.33 OSA (OBSTRUCTIVE SLEEP APNEA): Primary | ICD-10-CM

## 2022-12-01 NOTE — PROGRESS NOTES
Home Sleep Study Documentation    HOME STUDY DEVICE: Noxturnal yes                                           Shania G3 no      Pre-Sleep Home Study:    Set-up and instructions performed by: [facility]    Technician performed demonstration for Patient: yes    Return demonstration performed by Patient: yes    Written instructions provided to Patient: yes    Patient signed consent form: yes        Post-Sleep Home Study:    Additional comments by Patient:         Home Sleep Study Failed:no:    Failure reason: N/A    Reported or Detected: N/A    Scored by: VJ Dave

## 2022-12-08 ENCOUNTER — TELEPHONE (OUTPATIENT)
Dept: SLEEP CENTER | Facility: CLINIC | Age: 77
End: 2022-12-08

## 2022-12-08 NOTE — TELEPHONE ENCOUNTER
Sleep study shows mild CB and Dr Saintclair Malling ordered APAP Patient needs to schedule DME set up and compliance appointments     Left call back message

## 2022-12-09 NOTE — TELEPHONE ENCOUNTER
Spoke to the patient advised sleep study results and that Dr Dorys Proctor ordered APAP  Patient asking about Inspire device  I explained how the device works and that surgery is required for implantation of the device  Also explained he will need a consultation with ENT and an Inspire sleep specialist   Patient was not aware that surgery is involved  He decided to try CPAP  Patient asking his spouse who her DME provider is and will call back with the information       Patient needs set up and compliance appointments

## 2022-12-13 NOTE — TELEPHONE ENCOUNTER
Patient left voice message asking for call back  Returned patient's call and left call back message

## 2022-12-14 NOTE — TELEPHONE ENCOUNTER
Patient left message requesting call back  Returned call and left message  Provided both nurse line number and office number to call back to schedule DME set up and compliance follow up

## 2022-12-15 ENCOUNTER — PATIENT MESSAGE (OUTPATIENT)
Dept: SLEEP CENTER | Facility: CLINIC | Age: 77
End: 2022-12-15

## 2022-12-15 NOTE — TELEPHONE ENCOUNTER
Patient left message requesting call back  Returned call and left message  Provided office number to call back to schedule DME set up and compliance follow up  Also sent this information via [employer] message

## 2022-12-21 ENCOUNTER — HOSPITAL ENCOUNTER (OUTPATIENT)
Dept: NON INVASIVE DIAGNOSTICS | Facility: CLINIC | Age: 77
Discharge: HOME | End: 2022-12-21

## 2022-12-21 VITALS
HEART RATE: 63 BPM | BODY MASS INDEX: 32.54 KG/M2 | WEIGHT: 214 LBS | SYSTOLIC BLOOD PRESSURE: 110 MMHG | DIASTOLIC BLOOD PRESSURE: 70 MMHG

## 2022-12-21 DIAGNOSIS — R07.9 CHEST PAIN AT REST: ICD-10-CM

## 2022-12-21 LAB
BASELINE ST DEPRESSION: 0 MM
CHEST PAIN STATEMENT: NORMAL
MAX DIASTOLIC BP: 80 MMHG
MAX HEART RATE: 91 BPM
MAX PREDICTED HEART RATE: 143 BPM
MAX. SYSTOLIC BP: 142 MMHG
NUC STRESS EJECTION FRACTION: 70 %
PROTOCOL NAME: NORMAL
RATE PRESSURE PRODUCT: NORMAL
REASON FOR TERMINATION: NORMAL
SL CV REST NUCLEAR ISOTOPE DOSE: 10.84 MCI
SL CV STRESS NUCLEAR ISOTOPE DOSE: 31.3 MCI
SL CV STRESS RECOVERY BP: NORMAL MMHG
SL CV STRESS RECOVERY HR: 71 BPM
SL CV STRESS RECOVERY O2 SAT: 96 %
STRESS ANGINA INDEX: 0
STRESS BASELINE BP: NORMAL MMHG
STRESS BASELINE HR: 63 BPM
STRESS O2 SAT REST: 96 %
STRESS PEAK HR: 87 BPM
STRESS POST O2 SAT PEAK: 98 %
STRESS POST PEAK BP: 142 MMHG
STRESS ST DEPRESSION: 0 MM
STRESS/REST PERFUSION RATIO: 1.09
TARGET HR FORMULA: NORMAL
TEST INDICATION: NORMAL
TIME IN EXERCISE PHASE: NORMAL

## 2022-12-21 RX ADMIN — REGADENOSON 0.4 MG: 0.08 INJECTION, SOLUTION INTRAVENOUS at 09:09

## 2023-02-06 ENCOUNTER — TELEPHONE (OUTPATIENT)
Dept: FAMILY MEDICINE CLINIC | Facility: CLINIC | Age: 78
End: 2023-02-06

## 2023-02-06 NOTE — TELEPHONE ENCOUNTER
Patient complaining of bilateral leg swelling with itchy, weeping sores  Wife said legs are mottled & purplish  I scheduled him tomorrow at 9:30 am with Dr Jeremiah Alcantara  Ok to wait until then?

## 2023-02-07 ENCOUNTER — OFFICE VISIT (OUTPATIENT)
Dept: FAMILY MEDICINE CLINIC | Facility: CLINIC | Age: 78
End: 2023-02-07

## 2023-02-07 VITALS
HEIGHT: 68 IN | WEIGHT: 214.5 LBS | HEART RATE: 84 BPM | RESPIRATION RATE: 17 BRPM | BODY MASS INDEX: 32.51 KG/M2 | DIASTOLIC BLOOD PRESSURE: 72 MMHG | SYSTOLIC BLOOD PRESSURE: 134 MMHG | OXYGEN SATURATION: 96 % | TEMPERATURE: 97.6 F

## 2023-02-07 DIAGNOSIS — M48.07 LUMBOSACRAL STENOSIS WITH NEUROGENIC CLAUDICATION (HCC): ICD-10-CM

## 2023-02-07 DIAGNOSIS — I87.2 CHRONIC VENOUS INSUFFICIENCY OF LOWER EXTREMITY: ICD-10-CM

## 2023-02-07 DIAGNOSIS — G95.19 LUMBOSACRAL STENOSIS WITH NEUROGENIC CLAUDICATION (HCC): ICD-10-CM

## 2023-02-07 DIAGNOSIS — L30.0 NUMMULAR ECZEMA: Primary | ICD-10-CM

## 2023-02-07 RX ORDER — TRIAMCINOLONE ACETONIDE 1 MG/G
CREAM TOPICAL 2 TIMES DAILY
Qty: 80 G | Refills: 2 | Status: SHIPPED | OUTPATIENT
Start: 2023-02-07

## 2023-02-07 NOTE — PROGRESS NOTES
Name: Thais Hernandez      : 1945      MRN: [id number]  Encounter Provider: Paul Serna MD  Encounter Date: 2023   Encounter department: [address] [facility] [address]     1  Nummular eczema  -     triamcinolone (KENALOG) 0 1 % cream; Apply topically 2 (two) times a day    2  Chronic venous insufficiency of lower extremity    3  Lumbosacral stenosis with neurogenic claudication (HCC)    Triamcinolone cream 0 1%  applied to affected areas lower extremities  Avoid scratching/itching  Moisturizer after bathing  Mild soap  Support stockings to control lower extremity edema  Recheck as needed  Subjective     Pruritic areas both legs  Mild swelling both legs L > R chronic  Weeping from area LLE  No pain  No fevers  S/p L THR 6 months ago  Current medications reviewed  Hypertension blood pressures have been stable on Atenolol 25 mg daily and Valsartan HCTZ 160/12 5 daily  10/2022 Creatinine 1 04  GFR 68  Hgb 13 9  Hyperlipidemia mixed type and CAD no longer on Vytorin 10/20 daily  Prior intolerance to Atorvastatin  On Zetia 10 mg/day and  generic Lovaza 1 gm 2 capsules BID  Last lipid profile 2022 see note  Impaired fasting glucose 10/2022  2021 A1c 5 6        Lab Results   Component Value Date    WBC 6 33 10/14/2022    HGB 13 9 10/14/2022    HCT 42 3 10/14/2022     (H) 10/14/2022     10/14/2022     Lab Results   Component Value Date     2015    SODIUM 138 10/14/2022    K 3 8 10/14/2022     10/14/2022    CO2 26 10/14/2022    ANIONGAP 7 2015    AGAP 8 10/14/2022    BUN 14 10/14/2022    CREATININE 1 04 10/14/2022    GLUC 142 (H) 2022    GLUF 123 (H) 10/14/2022    CALCIUM 9 2 10/14/2022    AST 22 2022    ALT 22 2022    ALKPHOS 74 2022    PROT 7 1 2015    TP 6 3 (L) 2022    BILITOT 0 84 2015    TBILI 1 10 (H) 2022    EGFR 68 10/14/2022     Lab Results   Component Value Date CHOLESTEROL 117 03/15/2022    CHOLESTEROL 113 08/26/2021    CHOLESTEROL 114 06/16/2020     Lab Results   Component Value Date    HDL 46 03/15/2022    HDL 44 08/26/2021    HDL 51 06/16/2020     Lab Results   Component Value Date    TRIG 175 (H) 03/15/2022    TRIG 172 (H) 08/26/2021    TRIG 136 06/16/2020     Lab Results   Component Value Date    LDLCALC 36 03/15/2022     Lab Results   Component Value Date    HGBA1C 5 6 08/26/2021         Review of Systems   Constitutional: Negative for appetite change, chills, fever and unexpected weight change  HENT: Negative for congestion, ear pain, rhinorrhea, sore throat and trouble swallowing  Eyes: Negative for visual disturbance  Respiratory: Negative for cough, shortness of breath and wheezing  12/2014 CT scan chest normal   Cardiovascular: Negative for chest pain, palpitations and leg swelling  See HPI  CAD s/p inferior MI  2 stents RCA  02/2021 nuclear stress test no chest pain during stress  Stress EKG negative for ischemia  Perfusion imaging no perfusion defects  Calculated left ventricular ejection fraction 60%  No left ventricular regional abnormality  01/2021 echocardiogram normal left ventricular systolic function  EF 60%  No regional wall motion abnormalities  Left ventricular diastolic function normal   Trace MR  Trace TR  Pulmonary artery systolic pressure within normal range  No pericardial effusion  Aortic root normal size  11/2016 carotid artery dopplers ICAs < 50% stenosis bilaterally    Gastrointestinal: Negative for abdominal pain, blood in stool, constipation, diarrhea, nausea and vomiting  GERD stable on Protonix 40 mg daily  No reflux  no dysphagia  Colonoscopy 11/2017   Endocrine: Negative for polydipsia and polyuria  Genitourinary: Negative for difficulty urinating  01/2020 renal ultrasound normal no hydronephrosis    No significant PVR    Lab Results       Component                Value               Date PSA                      0 4                 02/25/2021                 PSA                      0 4                 01/17/2020                 PSA                      0 6                 04/18/2018                         Musculoskeletal: Positive for back pain  Negative for arthralgias and myalgias  Lower back pain/spinal stenosis  Followed by pain management  No radicular pain  Prior lumbar epidural steroid injections  S/p repeat right shoulder rotator surgery 04/2021  Skin: Negative for rash  Allergic/Immunologic: Negative for environmental allergies  Neurological: Negative for dizziness, weakness and headaches  Hematological: Negative for adenopathy  Does not bruise/bleed easily  History of mild thrombocytopenia        Lab Results       Component                Value               Date                       WBC                      6 33                10/14/2022                 HGB                      13 9                10/14/2022                 HCT                      42 3                10/14/2022                 MCV                      101 (H)             10/14/2022                 PLT                      181                 10/14/2022                   Hemoglobin       Date                     Value               Ref Range           Status                06/25/2021               12 9                12 0 - 17 0 g/*     Final                 06/16/2020               13 8                12 0 - 17 0 g/*     Final                 01/30/2019               14 2                12 0 - 17 0 g/*     Final                 12/03/2015               14 3                12 0 - 17 0 g/*     Final                 07/16/2015               14 9                12 0 - 17 0 g/*     Final                 03/12/2015               13 9                12 0 - 17 0 g/*     Final                         Platelets       Date                     Value               Ref Range Status                06/16/2020               150                 149 - 390 Thou*     Final                 01/30/2019               159                 149 - 390 Thou*     Final                 08/24/2018               144 (L)             149 - 390 Thou*     Final                 12/03/2015               145 (L)             149 - 390 Thou*     Final                 07/16/2015               144 (L)             149 - 390 Thou*     Final                 03/12/2015               159                 149 - 390 Thou*     Final                 Psychiatric/Behavioral: Negative for dysphoric mood and sleep disturbance  Stable on Paxil 10 mg daily        Past Medical History:   Diagnosis Date   • Abnormal electrocardiography     resolved: 11/21/2017   • Abnormal laboratory test     last assessed: 11/16/2016   • Acute myocardial infarction of inferior wall (HCC) 1/20/2015   • Anxiety    • Community acquired pneumonia     last assessed: 10/15/2015   • Complete tear of left rotator cuff     unspecified laterality   • Coronary artery disease    • Depression    • GERD (gastroesophageal reflux disease)    • Hyperlipidemia    • Hypertension    • Impingement syndrome of left shoulder 3/9/2015   • Inguinal mass     last assessed: 1/21/2015   • Myocardial infarction [facility])    • Nontraumatic rupture of tendons of biceps (long head), left     last assessed: 1/2/2017   • Right trigger finger     last assessed: 1/13/2015   • Skin lesion     last assessed: 4/21/2016   • Skin lesion of cheek     last assessed: 2/25/2016   • Thrombocytopenia ([address] ) 1/13/2015     Past Surgical History:   Procedure Laterality Date   • CAROTID STENT      transcath placement of carotid artery stent   • COLONOSCOPY     • CORONARY ANGIOPLASTY     • DC COLONOSCOPY FLX DX W/COLLJ SPEC WHEN PFRMD N/A 11/3/2017    Procedure: COLONOSCOPY;  Surgeon: Tejal Jerez MD;  Location: [facility];   Service: Colorectal   • ROTATOR CUFF REPAIR Bilateral    • SHOULDER SURGERY       Family History   Problem Relation Age of Onset   • Other Mother         brain tumor   • Coronary artery disease Mother    • No Known Problems Father      Social History     Socioeconomic History   • Marital status: /Civil Union     Spouse name: None   • Number of children: None   • Years of education: None   • Highest education level: None   Occupational History   • None   Tobacco Use   • Smoking status: Former     Packs/day: 1 00     Years: 20 00     Pack years: 20 00     Types: Cigarettes     Quit date:      Years since quittin 1   • Smokeless tobacco: Never   Substance and Sexual Activity   • Alcohol use:  Yes   • Drug use: No   • Sexual activity: None   Other Topics Concern   • None   Social History Narrative   • None     Social Determinants of Health     Financial Resource Strain: Not on file   Food Insecurity: Not on file   Transportation Needs: Not on file   Physical Activity: Not on file   Stress: Not on file   Social Connections: Not on file   Intimate Partner Violence: Not on file   Housing Stability: Not on file     Current Outpatient Medications on File Prior to Visit   Medication Sig   • amoxicillin (AMOXIL) 500 mg capsule    • aspirin (ECOTRIN LOW STRENGTH) 81 mg EC tablet Take 1 tablet (81 mg total) by mouth 2 (two) times a day   • atenolol (TENORMIN) 25 mg tablet Take 1 tablet (25 mg total) by mouth daily   • Coenzyme Q10 200 MG capsule Take 400 mg by mouth daily   • ezetimibe (ZETIA) 10 mg tablet Take 0 5 tablets (5 mg total) by mouth daily   • fluticasone (FLONASE) 50 mcg/act nasal spray SPRAY 2 SPRAYS INTO EACH NOSTRIL EVERY DAY   • hydrochlorothiazide (HYDRODIURIL) 12 5 mg tablet Take 12 5 mg by mouth daily   • losartan (COZAAR) 50 mg tablet Take 50 mg by mouth daily   • omega-3-acid ethyl esters (LOVAZA) 1 g capsule TAKE 2 CAPSULES (2 G TOTAL) BY MOUTH 2 (TWO) TIMES A DAY   • pantoprazole (PROTONIX) 40 mg tablet Take 40 mg by mouth daily   • PARoxetine (PAXIL) 20 mg tablet Take 10 mg by mouth daily   • simvastatin (ZOCOR) 40 mg tablet Take 20 mg by mouth daily at bedtime Patient taking 20 mg, half of a 40 mg tablet  • tamsulosin (FLOMAX) 0 4 mg Take 0 4 mg by mouth daily     Allergies   Allergen Reactions   • Atorvastatin GI Intolerance     Immunization History   Administered Date(s) Administered   • COVID-19 PFIZER VACCINE 0 3 ML IM 01/12/2021, 02/05/2021, 12/10/2021   • INFLUENZA 11/01/2008, 11/10/2009, 11/10/2015, 10/27/2016, 10/24/2018, 09/14/2019, 10/01/2019, 10/13/2021   • Influenza Split High Dose Preservative Free IM 10/02/2013, 11/10/2015, 09/06/2017, 10/07/2019   • Influenza, high dose seasonal 0 7 mL 10/24/2018, 10/02/2020   • Influenza, seasonal, injectable 11/09/2011, 11/11/2014, 10/27/2016   • Pneumococcal Conjugate 13-Valent 12/30/2014, 01/29/2020   • Pneumococcal Polysaccharide PPV23 11/16/2016   • Tdap 09/09/2013   • Zoster Vaccine Recombinant 08/12/2019, 10/28/2019       Objective     /72 (BP Location: Left arm, Patient Position: Sitting, Cuff Size: Standard)   Pulse 84   Temp 97 6 °F (36 4 °C) (Temporal)   Resp 17   Ht 5' 8" (1 727 m)   Wt 97 3 kg (214 lb 8 oz)   SpO2 96%   BMI 32 61 kg/m²     Physical Exam  Constitutional:       General: He is not in acute distress  Cardiovascular:      Comments: Trace to 1+ LEs L >R  + mild stasis pigmentation changes with superficial venous varicosities  No calf tenderness or Homans sign   Musculoskeletal:      Right lower leg: Edema present  Left lower leg: Edema present  Skin:     Findings: Rash present  Comments: Areas of nummular eczema R lateral calf and L lower shin areas  No signs of infection  No drainage  Neurological:      Mental Status: He is alert         Marcus Perez MD

## 2023-02-20 ENCOUNTER — OFFICE VISIT (OUTPATIENT)
Dept: FAMILY MEDICINE CLINIC | Facility: CLINIC | Age: 78
End: 2023-02-20

## 2023-02-20 ENCOUNTER — APPOINTMENT (OUTPATIENT)
Dept: LAB | Facility: CLINIC | Age: 78
End: 2023-02-20

## 2023-02-20 VITALS
TEMPERATURE: 98.1 F | HEART RATE: 66 BPM | DIASTOLIC BLOOD PRESSURE: 72 MMHG | HEIGHT: 68 IN | OXYGEN SATURATION: 97 % | SYSTOLIC BLOOD PRESSURE: 130 MMHG | BODY MASS INDEX: 32.43 KG/M2 | WEIGHT: 214 LBS

## 2023-02-20 DIAGNOSIS — R07.9 CHEST PAIN AT REST: ICD-10-CM

## 2023-02-20 DIAGNOSIS — G47.33 OSA (OBSTRUCTIVE SLEEP APNEA): ICD-10-CM

## 2023-02-20 DIAGNOSIS — I10 PRIMARY HYPERTENSION: ICD-10-CM

## 2023-02-20 DIAGNOSIS — G44.209 MUSCLE TENSION HEADACHE: Primary | ICD-10-CM

## 2023-02-20 DIAGNOSIS — L30.0 NUMMULAR ECZEMA: ICD-10-CM

## 2023-02-20 LAB
ALBUMIN SERPL BCP-MCNC: 3.3 G/DL (ref 3.5–5)
ALP SERPL-CCNC: 71 U/L (ref 46–116)
ALT SERPL W P-5'-P-CCNC: 29 U/L (ref 12–78)
ANION GAP SERPL CALCULATED.3IONS-SCNC: 8 MMOL/L (ref 4–13)
AST SERPL W P-5'-P-CCNC: 24 U/L (ref 5–45)
BASOPHILS # BLD AUTO: 0.04 THOUSANDS/ÂΜL (ref 0–0.1)
BASOPHILS NFR BLD AUTO: 1 % (ref 0–1)
BILIRUB SERPL-MCNC: 0.82 MG/DL (ref 0.2–1)
BUN SERPL-MCNC: 13 MG/DL (ref 5–25)
CALCIUM ALBUM COR SERPL-MCNC: 9.7 MG/DL (ref 8.3–10.1)
CALCIUM SERPL-MCNC: 9.1 MG/DL (ref 8.3–10.1)
CHLORIDE SERPL-SCNC: 104 MMOL/L (ref 96–108)
CO2 SERPL-SCNC: 26 MMOL/L (ref 21–32)
CREAT SERPL-MCNC: 0.96 MG/DL (ref 0.6–1.3)
EOSINOPHIL # BLD AUTO: 0.76 THOUSAND/ÂΜL (ref 0–0.61)
EOSINOPHIL NFR BLD AUTO: 12 % (ref 0–6)
ERYTHROCYTE [DISTWIDTH] IN BLOOD BY AUTOMATED COUNT: 12.2 % (ref 11.6–15.1)
GFR SERPL CREATININE-BSD FRML MDRD: 75 ML/MIN/1.73SQ M
GLUCOSE P FAST SERPL-MCNC: 112 MG/DL (ref 65–99)
HCT VFR BLD AUTO: 41.8 % (ref 36.5–49.3)
HGB BLD-MCNC: 13.8 G/DL (ref 12–17)
IMM GRANULOCYTES # BLD AUTO: 0.02 THOUSAND/UL (ref 0–0.2)
IMM GRANULOCYTES NFR BLD AUTO: 0 % (ref 0–2)
LYMPHOCYTES # BLD AUTO: 2.02 THOUSANDS/ÂΜL (ref 0.6–4.47)
LYMPHOCYTES NFR BLD AUTO: 31 % (ref 14–44)
MCH RBC QN AUTO: 33.6 PG (ref 26.8–34.3)
MCHC RBC AUTO-ENTMCNC: 33 G/DL (ref 31.4–37.4)
MCV RBC AUTO: 102 FL (ref 82–98)
MONOCYTES # BLD AUTO: 0.46 THOUSAND/ÂΜL (ref 0.17–1.22)
MONOCYTES NFR BLD AUTO: 7 % (ref 4–12)
NEUTROPHILS # BLD AUTO: 3.2 THOUSANDS/ÂΜL (ref 1.85–7.62)
NEUTS SEG NFR BLD AUTO: 49 % (ref 43–75)
NRBC BLD AUTO-RTO: 0 /100 WBCS
PLATELET # BLD AUTO: 205 THOUSANDS/UL (ref 149–390)
PMV BLD AUTO: 9.1 FL (ref 8.9–12.7)
POTASSIUM SERPL-SCNC: 4.3 MMOL/L (ref 3.5–5.3)
PROT SERPL-MCNC: 7 G/DL (ref 6.4–8.4)
RBC # BLD AUTO: 4.11 MILLION/UL (ref 3.88–5.62)
SODIUM SERPL-SCNC: 138 MMOL/L (ref 135–147)
WBC # BLD AUTO: 6.5 THOUSAND/UL (ref 4.31–10.16)

## 2023-02-20 NOTE — PROGRESS NOTES
Name: Augie Roberson      : 1945      MRN: [id number]  Encounter Provider: Vladislav Cho MD  Encounter Date: 2023   Encounter department: [address] [place] [address]     1  Muscle tension headache    2  CB (obstructive sleep apnea)  Assessment & Plan:  Likely contributing to elevated eosinophil count  Patient refused to wear CPAP machine  3  Primary hypertension  Assessment & Plan:  Currently stable  Continue current medications  4  Nummular eczema  Assessment & Plan:  Improvement since starting Kenalog cream   Continue with cream         Point tenderness with pain moving head to the right  Likely musculoskeletal in origin  There are no vision changes loss of sensation, weakness  Neuro exam is unremarkable  No need for imaging at this time  Continue with Tylenol  Recommend icing next 20 minutes 4-5 times daily  If no improvement or any neurological symptoms develop we will obtain imaging of the head  Follow-up for annual physical  Subjective      Pain in the back of head yesterday  Woke up this morning with a headache  Headache is coming and going  Localized to the right side  Describes it as a pulsating pain  When patient turns his neck to the right he does experience some pain in that spot  Denies any changes in vision  Location is tender to palpation  Took some Tylenol last night before bed  Helped at the time  Has not taken anything as of this morning  Review of Systems   Constitutional: Negative for activity change, appetite change, fatigue and fever  HENT: Negative for sore throat  Eyes: Negative for visual disturbance  Respiratory: Negative for cough, chest tightness and shortness of breath  Cardiovascular: Negative for chest pain, palpitations and leg swelling  Gastrointestinal: Negative for abdominal pain, constipation, diarrhea and nausea  Musculoskeletal: Positive for neck pain  Negative for back pain  Skin: Positive for rash  Neurological: Positive for headaches  Negative for dizziness, facial asymmetry, speech difficulty, weakness, light-headedness and numbness  Psychiatric/Behavioral: Negative for behavioral problems and confusion  Current Outpatient Medications on File Prior to Visit   Medication Sig   • amoxicillin (AMOXIL) 500 mg capsule    • aspirin (ECOTRIN LOW STRENGTH) 81 mg EC tablet Take 1 tablet (81 mg total) by mouth 2 (two) times a day   • atenolol (TENORMIN) 25 mg tablet Take 1 tablet (25 mg total) by mouth daily   • Coenzyme Q10 200 MG capsule Take 400 mg by mouth daily   • ezetimibe (ZETIA) 10 mg tablet Take 0 5 tablets (5 mg total) by mouth daily   • fluticasone (FLONASE) 50 mcg/act nasal spray SPRAY 2 SPRAYS INTO EACH NOSTRIL EVERY DAY   • hydrochlorothiazide (HYDRODIURIL) 12 5 mg tablet Take 12 5 mg by mouth daily   • losartan (COZAAR) 50 mg tablet Take 50 mg by mouth daily   • omega-3-acid ethyl esters (LOVAZA) 1 g capsule TAKE 2 CAPSULES (2 G TOTAL) BY MOUTH 2 (TWO) TIMES A DAY   • pantoprazole (PROTONIX) 40 mg tablet Take 40 mg by mouth daily   • PARoxetine (PAXIL) 20 mg tablet Take 10 mg by mouth daily   • simvastatin (ZOCOR) 40 mg tablet Take 20 mg by mouth daily at bedtime Patient taking 20 mg, half of a 40 mg tablet  • tamsulosin (FLOMAX) 0 4 mg Take 0 4 mg by mouth daily   • triamcinolone (KENALOG) 0 1 % cream Apply topically 2 (two) times a day       Objective     /72 (BP Location: Left arm, Patient Position: Sitting, Cuff Size: Large)   Pulse 66   Temp 98 1 °F (36 7 °C)   Ht 5' 8" (1 727 m)   Wt 97 1 kg (214 lb)   SpO2 97%   BMI 32 54 kg/m²     Physical Exam  Vitals and nursing note reviewed  Constitutional:       Appearance: He is well-developed  HENT:      Head: Normocephalic and atraumatic  No masses  Comments: Tenderness to palpation over right occipital bone  Eyes:      General: Lids are normal  No visual field deficit       Extraocular Movements: Extraocular movements intact  Right eye: Normal extraocular motion and no nystagmus  Left eye: Normal extraocular motion and no nystagmus  Pupils: Pupils are equal, round, and reactive to light  Cardiovascular:      Rate and Rhythm: Normal rate and regular rhythm  Pulmonary:      Effort: Pulmonary effort is normal       Breath sounds: Normal breath sounds  Musculoskeletal:      Cervical back: Pain with movement and muscular tenderness present  Decreased range of motion  Neurological:      General: No focal deficit present  Mental Status: He is alert and oriented to person, place, and time  Cranial Nerves: No cranial nerve deficit  Sensory: No sensory deficit  Motor: No weakness or pronator drift  Coordination: Coordination is intact  Coordination normal       Gait: Gait is intact   Gait normal       Deep Tendon Reflexes: Reflexes normal    Psychiatric:         Mood and Affect: Mood normal          Behavior: Behavior normal        Rakesh Young MD

## 2023-04-06 ENCOUNTER — OFFICE VISIT (OUTPATIENT)
Dept: FAMILY MEDICINE CLINIC | Facility: CLINIC | Age: 78
End: 2023-04-06

## 2023-04-06 VITALS
HEART RATE: 66 BPM | HEIGHT: 68 IN | WEIGHT: 211 LBS | DIASTOLIC BLOOD PRESSURE: 76 MMHG | SYSTOLIC BLOOD PRESSURE: 130 MMHG | TEMPERATURE: 98 F | BODY MASS INDEX: 31.98 KG/M2 | OXYGEN SATURATION: 96 %

## 2023-04-06 DIAGNOSIS — J06.9 UPPER RESPIRATORY TRACT INFECTION, UNSPECIFIED TYPE: Primary | ICD-10-CM

## 2023-04-06 RX ORDER — AZITHROMYCIN 250 MG/1
TABLET, FILM COATED ORAL
Qty: 6 TABLET | Refills: 0 | Status: SHIPPED | OUTPATIENT
Start: 2023-04-06 | End: 2023-04-11

## 2023-04-06 NOTE — PROGRESS NOTES
Name: Nahomy Galarza      : 1945      MRN: [id number]  Encounter Provider: Lidia Gonzáles MD  Encounter Date: 2023   Encounter department: [facility]     1  Upper respiratory tract infection, unspecified type  -     azithromycin (Zithromax) 250 mg tablet; Take 2 tablets (500 mg total) by mouth daily for 1 day, THEN 1 tablet (250 mg total) daily for 4 days  Likely viral   Discussed this in detail with patient  He is concerned that Flonase does not work for him  Patient however is in agreement to start Flonase again  We will send azithromycin to the pharmacy which can be started if no improvement  Patient understands and agrees with plan       Subjective      Patient presents with:  Nasal Congestion: For 1 week    Nasal congestion and cough for the past week  Initially improved but is since returned  Denies any fever nausea vomiting or diarrhea  Has not tried any over-the-counter medications yet  Patient states he did not try Flonase because this typically makes his sinus congestion worse  Review of Systems   Constitutional: Negative for fatigue and fever  HENT: Positive for congestion and sinus pressure  Negative for sore throat  Eyes: Negative for visual disturbance  Respiratory: Positive for cough  Negative for chest tightness and shortness of breath  Cardiovascular: Negative for chest pain, palpitations and leg swelling  Gastrointestinal: Negative for abdominal pain, constipation, diarrhea and nausea  Endocrine: Negative for cold intolerance and heat intolerance  Genitourinary: Negative for flank pain  Musculoskeletal: Negative for back pain and neck pain  Skin: Negative for rash  Neurological: Negative for headaches  Psychiatric/Behavioral: Negative for behavioral problems and confusion         Current Outpatient Medications on File Prior to Visit   Medication Sig   • amoxicillin (AMOXIL) 500 mg capsule    • aspirin "(ECOTRIN LOW STRENGTH) 81 mg EC tablet Take 1 tablet (81 mg total) by mouth 2 (two) times a day   • atenolol (TENORMIN) 25 mg tablet Take 1 tablet (25 mg total) by mouth daily   • Coenzyme Q10 200 MG capsule Take 400 mg by mouth daily   • ezetimibe (ZETIA) 10 mg tablet Take 0 5 tablets (5 mg total) by mouth daily   • fluticasone (FLONASE) 50 mcg/act nasal spray SPRAY 2 SPRAYS INTO EACH NOSTRIL EVERY DAY   • hydrochlorothiazide (HYDRODIURIL) 12 5 mg tablet Take 12 5 mg by mouth daily   • losartan (COZAAR) 50 mg tablet Take 50 mg by mouth daily   • omega-3-acid ethyl esters (LOVAZA) 1 g capsule TAKE 2 CAPSULES (2 G TOTAL) BY MOUTH 2 (TWO) TIMES A DAY   • pantoprazole (PROTONIX) 40 mg tablet Take 40 mg by mouth daily   • PARoxetine (PAXIL) 20 mg tablet Take 10 mg by mouth daily   • simvastatin (ZOCOR) 40 mg tablet Take 20 mg by mouth daily at bedtime Patient taking 20 mg, half of a 40 mg tablet  • tamsulosin (FLOMAX) 0 4 mg Take 0 4 mg by mouth daily   • triamcinolone (KENALOG) 0 1 % cream Apply topically 2 (two) times a day       Objective     /76 (BP Location: Left arm, Patient Position: Sitting, Cuff Size: Large)   Pulse 66   Temp 98 °F (36 7 °C)   Ht 5' 8\" (1 727 m)   Wt 95 7 kg (211 lb)   SpO2 96%   BMI 32 08 kg/m²     Physical Exam  Vitals and nursing note reviewed  Constitutional:       Appearance: He is well-developed  HENT:      Head: Normocephalic and atraumatic  Cardiovascular:      Rate and Rhythm: Normal rate and regular rhythm  Pulmonary:      Effort: Pulmonary effort is normal       Breath sounds: Normal breath sounds  Neurological:      General: No focal deficit present  Mental Status: He is alert     Psychiatric:         Mood and Affect: Mood normal          Behavior: Behavior normal        Lebron Gil MD  "

## 2023-08-18 ENCOUNTER — APPOINTMENT (OUTPATIENT)
Dept: LAB | Facility: CLINIC | Age: 78
End: 2023-08-18
Payer: COMMERCIAL

## 2023-08-18 DIAGNOSIS — R35.1 BENIGN PROSTATIC HYPERPLASIA WITH NOCTURIA: ICD-10-CM

## 2023-08-18 DIAGNOSIS — N40.1 BENIGN PROSTATIC HYPERPLASIA WITH NOCTURIA: ICD-10-CM

## 2023-08-18 LAB
BUN SERPL-MCNC: 21 MG/DL (ref 5–25)
CREAT SERPL-MCNC: 1.01 MG/DL (ref 0.6–1.3)
GFR SERPL CREATININE-BSD FRML MDRD: 70 ML/MIN/1.73SQ M
PSA SERPL-MCNC: 0.18 NG/ML (ref 0–4)

## 2023-08-18 PROCEDURE — 84520 ASSAY OF UREA NITROGEN: CPT

## 2023-08-18 PROCEDURE — 82565 ASSAY OF CREATININE: CPT

## 2023-08-18 PROCEDURE — 36415 COLL VENOUS BLD VENIPUNCTURE: CPT

## 2023-08-18 PROCEDURE — 84153 ASSAY OF PSA TOTAL: CPT

## 2023-08-30 ENCOUNTER — OFFICE VISIT (OUTPATIENT)
Dept: FAMILY MEDICINE CLINIC | Facility: CLINIC | Age: 78
End: 2023-08-30
Payer: COMMERCIAL

## 2023-08-30 VITALS
WEIGHT: 203.5 LBS | SYSTOLIC BLOOD PRESSURE: 118 MMHG | OXYGEN SATURATION: 98 % | TEMPERATURE: 97.4 F | HEART RATE: 73 BPM | BODY MASS INDEX: 30.84 KG/M2 | HEIGHT: 68 IN | DIASTOLIC BLOOD PRESSURE: 60 MMHG

## 2023-08-30 DIAGNOSIS — K21.9 LARYNGOPHARYNGEAL REFLUX (LPR): Primary | ICD-10-CM

## 2023-08-30 DIAGNOSIS — E78.2 MIXED HYPERLIPIDEMIA: ICD-10-CM

## 2023-08-30 DIAGNOSIS — I10 PRIMARY HYPERTENSION: ICD-10-CM

## 2023-08-30 DIAGNOSIS — I25.10 ATHEROSCLEROSIS OF NATIVE CORONARY ARTERY OF NATIVE HEART WITHOUT ANGINA PECTORIS: ICD-10-CM

## 2023-08-30 DIAGNOSIS — K21.9 GERD WITHOUT ESOPHAGITIS: ICD-10-CM

## 2023-08-30 PROCEDURE — 99214 OFFICE O/P EST MOD 30 MIN: CPT | Performed by: FAMILY MEDICINE
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
## 2023-11-20 ENCOUNTER — OFFICE VISIT (OUTPATIENT)
Dept: FAMILY MEDICINE CLINIC | Facility: CLINIC | Age: 78
End: 2023-11-20
Payer: COMMERCIAL

## 2023-11-20 VITALS
TEMPERATURE: 97.8 F | DIASTOLIC BLOOD PRESSURE: 74 MMHG | WEIGHT: 210 LBS | HEART RATE: 69 BPM | SYSTOLIC BLOOD PRESSURE: 130 MMHG | HEIGHT: 68 IN | RESPIRATION RATE: 16 BRPM | BODY MASS INDEX: 31.83 KG/M2 | OXYGEN SATURATION: 96 %

## 2023-11-20 DIAGNOSIS — R73.01 IFG (IMPAIRED FASTING GLUCOSE): ICD-10-CM

## 2023-11-20 DIAGNOSIS — Z00.00 WELL ADULT EXAM: Primary | ICD-10-CM

## 2023-11-20 DIAGNOSIS — E78.2 MIXED HYPERLIPIDEMIA: ICD-10-CM

## 2023-11-20 DIAGNOSIS — G47.33 OSA (OBSTRUCTIVE SLEEP APNEA): ICD-10-CM

## 2023-11-20 DIAGNOSIS — I10 PRIMARY HYPERTENSION: ICD-10-CM

## 2023-11-20 DIAGNOSIS — I25.10 ATHEROSCLEROSIS OF NATIVE CORONARY ARTERY OF NATIVE HEART WITHOUT ANGINA PECTORIS: ICD-10-CM

## 2023-11-20 PROCEDURE — 99397 PER PM REEVAL EST PAT 65+ YR: CPT | Performed by: FAMILY MEDICINE

## 2023-11-20 RX ORDER — FINASTERIDE 5 MG/1
5 TABLET, FILM COATED ORAL DAILY
COMMUNITY
Start: 2023-09-25

## 2023-11-20 NOTE — PROGRESS NOTES
Name: Rebecca Cruz      : 1945      MRN: [id number]  Encounter Provider: Lisa Martins MD  Encounter Date: 2023   Encounter department: [address] [place] [address]     1. Well adult exam    2. Primary hypertension  -     CBC and differential  -     Comprehensive metabolic panel    3. Mixed hyperlipidemia  -     Lipid panel    4. IFG (impaired fasting glucose)  -     Hemoglobin A1C    5. Atherosclerosis of native coronary artery of native heart without angina pectoris    6. CB (obstructive sleep apnea)    Continue with current medications. Repeat labs. Follow-up with Cardiology and Urology. Office visit 1 year. Up-to-date with flu vaccine. BMI Counseling: Body mass index is 31.93 kg/m². The BMI is above normal. Nutrition recommendations include consuming healthier snacks, moderation in carbohydrate intake, reducing intake of saturated fat and trans fat, and reducing intake of cholesterol. Exercise recommendations include exercising 3-5 times per week. Subjective     66year old male here for Wellness exam.  Hospitalizations/surgeries/social history/family history reviewed see note. Hypertension blood pressures have been stable on Atenolol 25 mg daily, Losartan 50 mg/day and HCTZ 12.5 daily. 2023 Creatinine 1.01. 2023 electrolytes normal. Hgb 13.8. Hyperlipidemia mixed type and CAD followed by Cardiology. Prior intolerance to Atorvastatin. He is on Simvastatin 40 mg/day, Zetia 10 mg/day and  on generic Lovaza 1 gm 2 capsules BID. Lipid profile 2022 cholesterol 117. Triglycerides 175. HDL 46. LDL 36. 2023 LFTs normal.   Impaired fasting glucose 2023  . 2021 A1c 5.6.         Lab Results   Component Value Date    WBC 6.50 2023    HGB 13.8 2023    HCT 41.8 2023     (H) 2023     2023     Lab Results   Component Value Date     2015    SODIUM 138 2023    K 4.3 2023     02/20/2023    CO2 26 02/20/2023    ANIONGAP 7 12/03/2015    AGAP 8 02/20/2023    BUN 21 08/18/2023    CREATININE 1.01 08/18/2023    GLUC 142 (H) 05/11/2022    GLUF 112 (H) 02/20/2023    CALCIUM 9.1 02/20/2023    AST 24 02/20/2023    ALT 29 02/20/2023    ALKPHOS 71 02/20/2023    PROT 7.1 12/03/2015    TP 7.0 02/20/2023    BILITOT 0.84 12/03/2015    TBILI 0.82 02/20/2023    EGFR 70 08/18/2023     Lab Results   Component Value Date    CHOLESTEROL 117 03/15/2022    CHOLESTEROL 113 08/26/2021    CHOLESTEROL 114 06/16/2020     Lab Results   Component Value Date    HDL 46 03/15/2022    HDL 44 08/26/2021    HDL 51 06/16/2020     Lab Results   Component Value Date    TRIG 175 (H) 03/15/2022    TRIG 172 (H) 08/26/2021    TRIG 136 06/16/2020     Lab Results   Component Value Date    LDLCALC 36 03/15/2022              Review of Systems   Constitutional:  Negative for appetite change, chills, fever and unexpected weight change. HENT:  Negative for congestion, ear pain, rhinorrhea, sore throat and trouble swallowing. See HPI    Eyes:  Negative for visual disturbance. Respiratory:  Negative for cough, shortness of breath and wheezing. 12/2014 CT scan chest normal   Cardiovascular:  Negative for chest pain, palpitations and leg swelling. See HPI. CAD s/p inferior MI. 2 stents RCA. 12/2022  Nuclear  stress test- Stress ECG: No ST deviation is noted. The stress ECG is negative for ischemia after vasodilation and low level exercise, without reproduction of symptoms. Perfusion: There is a left ventricular perfusion defect that is medium in size with moderate reduction in uptake present in the entire inferior location(s) that is partially reversible. Stress Function: Left ventricular function post-stress is normal. Post-stress ejection fraction is 70 %. Stress Combined Conclusion: Left ventricular perfusion is probably abnormal. There is probable ischemia in the inferior wall.   Diaphragmatic attenuation artefact may be contributing to this pattern. 11/2016 carotid artery dopplers ICAs < 50% stenosis bilaterally    Gastrointestinal:  Negative for abdominal pain, blood in stool, constipation, diarrhea, nausea and vomiting. GERD stable on Protonix 40 mg daily. No reflux. no dysphagia. Colonoscopy 11/2017   Endocrine: Negative for polydipsia and polyuria. Genitourinary:  Negative for difficulty urinating. 01/2020 renal ultrasound normal no hydronephrosis. No significant PVR    Lab Results       Component                Value               Date                       PSA                      0.4                 02/25/2021                 PSA                      0.4                 01/17/2020                 PSA                      0.6                 04/18/2018                         Musculoskeletal:  Negative for arthralgias and myalgias. S/p repeat right shoulder rotator surgery 04/2021. S/p L THR    Skin:  Negative for rash. Allergic/Immunologic: Negative for environmental allergies. Neurological:  Negative for dizziness, weakness and headaches. Hematological:  Negative for adenopathy. Does not bruise/bleed easily. History of mild thrombocytopenia.       Lab Results       Component                Value               Date                       WBC                      6.50                02/20/2023                 HGB                      13.8                02/20/2023                 HCT                      41.8                02/20/2023                 MCV                      102 (H)             02/20/2023                 PLT                      205                 02/20/2023                    Hemoglobin       Date                     Value               Ref Range           Status                06/25/2021               12.9                12.0 - 17.0 g/*     Final                 06/16/2020               13.8                12.0 - 17.0 g/*     Final 01/30/2019               14.2                12.0 - 17.0 g/*     Final                 12/03/2015               14.3                12.0 - 17.0 g/*     Final                 07/16/2015               14.9                12.0 - 17.0 g/*     Final                 03/12/2015               13.9                12.0 - 17.0 g/*     Final                         Platelets       Date                     Value               Ref Range           Status                06/16/2020               150                 149 - 390 Thou*     Final                 01/30/2019               159                 149 - 390 Thou*     Final                 08/24/2018               144 (L)             149 - 390 Thou*     Final                 12/03/2015               145 (L)             149 - 390 Thou*     Final                 07/16/2015               144 (L)             149 - 390 Thou*     Final                 03/12/2015               159                 149 - 390 Thou*     Final                 Psychiatric/Behavioral:  Positive for dysphoric mood. Negative for sleep disturbance.          Stable on Paxil 10 mg daily        Past Medical History:   Diagnosis Date    Abnormal electrocardiography     resolved: 11/21/2017    Abnormal laboratory test     last assessed: 11/16/2016    Acute myocardial infarction of inferior wall ([address]) 1/20/2015    Anxiety     Community acquired pneumonia     last assessed: 10/15/2015    Complete tear of left rotator cuff     unspecified laterality    Coronary artery disease     Depression     GERD (gastroesophageal reflux disease)     Hyperlipidemia     Hypertension     Impingement syndrome of left shoulder 3/9/2015    Inguinal mass     last assessed: 1/21/2015    Myocardial infarction [facility])     Nontraumatic rupture of tendons of biceps (long head), left     last assessed: 1/2/2017    Right trigger finger     last assessed: 1/13/2015    Skin lesion     last assessed: 4/21/2016    Skin lesion of cheek     last assessed: 2016    Thrombocytopenia ([address]) 2015     Past Surgical History:   Procedure Laterality Date    CAROTID STENT      transcath placement of carotid artery stent    COLONOSCOPY      CORONARY ANGIOPLASTY      WY COLONOSCOPY FLX DX W/COLLJ SPEC WHEN PFRMD N/A 11/3/2017    Procedure: COLONOSCOPY;  Surgeon: Farideh Holt MD;  Location: [facility];   Service: Colorectal    ROTATOR CUFF REPAIR Bilateral     SHOULDER SURGERY       Family History   Problem Relation Age of Onset    Other Mother         brain tumor    Coronary artery disease Mother     No Known Problems Father      Social History     Socioeconomic History    Marital status: /Civil Union     Spouse name: None    Number of children: None    Years of education: None    Highest education level: None   Occupational History    None   Tobacco Use    Smoking status: Former     Packs/day: 1.00     Years: 20.00     Total pack years: 20.00     Types: Cigarettes     Quit date:      Years since quittin.9     Passive exposure: Past    Smokeless tobacco: Never   Vaping Use    Vaping Use: Never used   Substance and Sexual Activity    Alcohol use: Yes    Drug use: No    Sexual activity: Not Currently   Other Topics Concern    None   Social History Narrative    None     Social Determinants of Health     Financial Resource Strain: Not on file   Food Insecurity: Not on file   Transportation Needs: Not on file   Physical Activity: Not on file   Stress: Not on file   Social Connections: Not on file   Intimate Partner Violence: Not on file   Housing Stability: Not on file     Current Outpatient Medications on File Prior to Visit   Medication Sig    amoxicillin (AMOXIL) 500 mg capsule     aspirin (ECOTRIN LOW STRENGTH) 81 mg EC tablet Take 1 tablet (81 mg total) by mouth 2 (two) times a day    atenolol (TENORMIN) 25 mg tablet Take 1 tablet (25 mg total) by mouth daily    Coenzyme Q10 200 MG capsule Take 400 mg by mouth daily    ezetimibe (ZETIA) 10 mg tablet Take 0.5 tablets (5 mg total) by mouth daily    famotidine (PEPCID) 40 MG tablet TAKE 1 TABLET BY MOUTH EVERY DAY    finasteride (PROSCAR) 5 mg tablet Take 5 mg by mouth daily    fluticasone (FLONASE) 50 mcg/act nasal spray SPRAY 2 SPRAYS INTO EACH NOSTRIL EVERY DAY    hydrochlorothiazide (HYDRODIURIL) 12.5 mg tablet Take 12.5 mg by mouth daily    ipratropium (ATROVENT) 0.03 % nasal spray SPRAY 2 SPRAYS INTO EACH NOSTRIL EVERY 12 HOURS.    losartan (COZAAR) 50 mg tablet Take 50 mg by mouth daily    omega-3-acid ethyl esters (LOVAZA) 1 g capsule TAKE 2 CAPSULES (2 G TOTAL) BY MOUTH 2 (TWO) TIMES A DAY    pantoprazole (PROTONIX) 40 mg tablet Take 40 mg by mouth daily    PARoxetine (PAXIL) 20 mg tablet Take 10 mg by mouth daily    simvastatin (ZOCOR) 40 mg tablet Take 20 mg by mouth daily at bedtime Patient taking 20 mg, half of a 40 mg tablet.     tamsulosin (FLOMAX) 0.4 mg Take 0.4 mg by mouth daily    triamcinolone (KENALOG) 0.1 % cream Apply topically 2 (two) times a day     Allergies   Allergen Reactions    Atorvastatin GI Intolerance     Immunization History   Administered Date(s) Administered    COVID-19 PFIZER VACCINE 0.3 ML IM 01/12/2021, 02/05/2021, 12/10/2021    INFLUENZA 11/01/2008, 11/10/2009, 11/10/2015, 10/27/2016, 10/24/2018, 09/14/2019, 10/01/2019, 10/13/2021, 11/10/2022, 10/05/2023, 10/25/2023    Influenza Split High Dose Preservative Free IM 10/02/2013, 11/10/2015, 09/06/2017, 10/07/2019    Influenza, high dose seasonal 0.7 mL 10/24/2018, 10/02/2020    Influenza, seasonal, injectable 11/09/2011, 11/11/2014, 10/27/2016    Pneumococcal Conjugate 13-Valent 12/30/2014, 01/29/2020    Pneumococcal Polysaccharide PPV23 11/16/2016    Tdap 09/09/2013    Zoster Vaccine Recombinant 08/12/2019, 10/28/2019       Objective     /74 (BP Location: Left arm, Patient Position: Sitting, Cuff Size: Large)   Pulse 69   Temp 97.8 °F (36.6 °C)   Resp 16   Ht 5' 8" (1.727 m)   Wt 95.3 kg (210 lb)   SpO2 96% BMI 31.93 kg/m²      BP Readings from Last 3 Encounters:   11/20/23 130/74   08/30/23 118/60   04/06/23 130/76      Wt Readings from Last 3 Encounters:   11/20/23 95.3 kg (210 lb)   10/25/23 92.1 kg (203 lb)   09/27/23 92.1 kg (203 lb)          Physical Exam  Vitals and nursing note reviewed. Constitutional:       General: He is not in acute distress. Appearance: He is well-developed. HENT:      Right Ear: Tympanic membrane and ear canal normal.      Left Ear: Tympanic membrane and ear canal normal.      Mouth/Throat:      Mouth: No oral lesions. Pharynx: Oropharynx is clear. Eyes:      General: No scleral icterus. Extraocular Movements: Extraocular movements intact. Conjunctiva/sclera: Conjunctivae normal.      Pupils: Pupils are equal, round, and reactive to light. Neck:      Thyroid: No thyroid mass or thyromegaly. Vascular: No carotid bruit or JVD. Trachea: No tracheal deviation. Cardiovascular:      Rate and Rhythm: Normal rate and regular rhythm. Pulses:           Carotid pulses are 2+ on the right side and 2+ on the left side. Heart sounds: Normal heart sounds. No murmur heard. No gallop. Pulmonary:      Effort: Pulmonary effort is normal. No respiratory distress. Breath sounds: Normal breath sounds. No wheezing or rales. Musculoskeletal:      Right lower leg: No edema. Left lower leg: No edema. Lymphadenopathy:      Cervical: No cervical adenopathy. Upper Body:      Right upper body: No supraclavicular adenopathy. Left upper body: No supraclavicular adenopathy. Skin:     Findings: No rash. Nails: There is no clubbing. Neurological:      General: No focal deficit present. Mental Status: He is alert and oriented to person, place, and time.    Psychiatric:         Mood and Affect: Mood normal.         Behavior: Behavior normal.         Cognition and Memory: Cognition normal.       Meir Muller MD

## 2023-12-07 ENCOUNTER — TELEPHONE (OUTPATIENT)
Age: 78
End: 2023-12-07

## 2023-12-07 NOTE — TELEPHONE ENCOUNTER
Pts wife called. Pt tested positive for Covid last night. First available appt made for tomorrow 12/8/23. Pts wife is asking if Paxlovid can be prescribed today instead of waiting due to pts health issues. Please advise.

## 2023-12-08 ENCOUNTER — TELEMEDICINE (OUTPATIENT)
Dept: FAMILY MEDICINE CLINIC | Facility: CLINIC | Age: 78
End: 2023-12-08
Payer: COMMERCIAL

## 2023-12-08 DIAGNOSIS — R05.8 UPPER AIRWAY COUGH SYNDROME: ICD-10-CM

## 2023-12-08 DIAGNOSIS — U07.1 COVID: Primary | ICD-10-CM

## 2023-12-08 PROCEDURE — 99213 OFFICE O/P EST LOW 20 MIN: CPT | Performed by: STUDENT IN AN ORGANIZED HEALTH CARE EDUCATION/TRAINING PROGRAM

## 2023-12-08 RX ORDER — FLUTICASONE PROPIONATE 50 MCG
2 SPRAY, SUSPENSION (ML) NASAL 2 TIMES DAILY
Qty: 48 ML | Refills: 1 | Status: SHIPPED | OUTPATIENT
Start: 2023-12-08

## 2023-12-08 RX ORDER — BROMPHENIRAMINE MALEATE, PSEUDOEPHEDRINE HYDROCHLORIDE, AND DEXTROMETHORPHAN HYDROBROMIDE 2; 30; 10 MG/5ML; MG/5ML; MG/5ML
5 SYRUP ORAL 4 TIMES DAILY PRN
Qty: 120 ML | Refills: 0 | Status: SHIPPED | OUTPATIENT
Start: 2023-12-08

## 2023-12-08 RX ORDER — CETIRIZINE HYDROCHLORIDE 10 MG/1
5 TABLET ORAL DAILY
Qty: 10 TABLET | Refills: 0 | Status: SHIPPED | OUTPATIENT
Start: 2023-12-08

## 2023-12-08 NOTE — PROGRESS NOTES
COVID-19 Outpatient Progress Note    Assessment/Plan:    Problem List Items Addressed This Visit          Other    COVID - Primary     Home antigen test positive 12.6. 23. Patient within 5 day window however would like to attempt conservative therapy at this time. At this time I have recommended social distancing from family members who are currently asymptomatic. I have recommended using a mask while in common areas in the household. Frequent hand washing and cleaning of commonly used surfaces has also been reinforced for the patient and all family members. Strict ED precautions have been given in the event patient develops worsening SOB, fevers, and decreased PO intake. Have also advised the following:   - Good hydration: drink plenty of fluids to prevent dehydration.  - Rest: refrain from any strenuous exercise. - Fever and pain control: Tylenol 1000 mg, every 8 hours as needed OR Ibuprofen 600 mg, one tablet every 6 hours as needed. - Cough: Bromfed cough suppressant   - Nasal congestion/ear pressure: Flonase           Relevant Medications    cetirizine (ZyrTEC) 10 mg tablet    brompheniramine-pseudoephedrine-DM 30-2-10 MG/5ML syrup     Other Visit Diagnoses       Upper airway cough syndrome        Relevant Medications    fluticasone (FLONASE) 50 mcg/act nasal spray           Disposition:     Discussed vitamin D, vitamin C, and/or zinc supplementation with patient. I have spent a total time of 20 minutes on the day of the encounter for this patient including discussing prognosis, risks and benefits of treatment options, patient and family education and risk factor reductions. Encounter provider: Juliette Harvey MD     Provider located at: [address] [place] [address] Rd  [address]  [phone]     Recent Visits  No visits were found meeting these conditions.   Showing recent visits within past 7 days and meeting all other requirements  Today's Visits  Date Type Provider Dept   12/08/23 Telemedicine MD Jonh Disla   Showing today's visits and meeting all other requirements  Future Appointments  No visits were found meeting these conditions. Showing future appointments within next 150 days and meeting all other requirements     This virtual check-in was done via [address] [place] [address] and patient was informed that this is a secure, HIPAA-compliant platform. He agrees to proceed. Patient agrees to participate in a virtual check in via telephone or video visit instead of presenting to the office to address urgent/immediate medical needs. Patient is aware this is a billable service. He acknowledged consent and understanding of privacy and security of the video platform. The patient has agreed to participate and understands they can discontinue the visit at any time. After connecting through [facility], the patient was identified by name and date of birth. Waynard Sandifer was informed that this was a telemedicine visit and that the exam was being conducted confidentially over secure lines. Waynard Sandifer acknowledged consent and understanding of privacy and security of the telemedicine visit. I informed the patient that I have reviewed his record in Epic and presented the opportunity for him to ask any questions regarding the visit today. The patient agreed to participate. Verification of patient location:  Patient is located in the following state in which I hold an active license: [place]    Subjective:   Waynard Sandifer is a 66 y.o. male who is concerned about COVID-19. Patient's symptoms include fever (tmax 100.5), chills, fatigue, malaise, nasal congestion, anosmia, loss of taste, cough (productive of grey mucus), myalgias and headache (mild). Patient denies rhinorrhea, sore throat, shortness of breath, chest tightness, abdominal pain, nausea, vomiting and diarrhea.      - Date of symptom onset: 12/5/2023      COVID-19 vaccination status: Fully vaccinated with booster    Exposure:   Contact with a person who is under investigation (PUI) for or who is positive for COVID-19 within the last 14 days?: No    Hospitalized recently for fever and/or lower respiratory symptoms?: No      Currently a healthcare worker that is involved in direct patient care?: No      Works in a special setting where the risk of COVID-19 transmission may be high? (this may include long-term care, correctional and correction facilities; homeless shelters; assisted-living facilities and group homes.): No      Resident in a special setting where the risk of COVID-19 transmission may be high? (this may include long-term care, correctional and correction facilities; homeless shelters; assisted-living facilities and group homes.): No      Lab Results   Component Value Date    SARSCOV2 Negative 05/11/2022    Coral Jama Not Detected 10/06/2020       Review of Systems   Constitutional:  Positive for chills, fatigue and fever (tmax 100.5). HENT:  Positive for congestion. Negative for rhinorrhea and sore throat. Respiratory:  Positive for cough (productive of grey mucus). Negative for chest tightness and shortness of breath. Gastrointestinal:  Negative for abdominal pain, diarrhea, nausea and vomiting. Musculoskeletal:  Positive for myalgias. Neurological:  Positive for headaches (mild).      Current Outpatient Medications on File Prior to Visit   Medication Sig    amoxicillin (AMOXIL) 500 mg capsule     aspirin (ECOTRIN LOW STRENGTH) 81 mg EC tablet Take 1 tablet (81 mg total) by mouth 2 (two) times a day    atenolol (TENORMIN) 25 mg tablet Take 1 tablet (25 mg total) by mouth daily    Coenzyme Q10 200 MG capsule Take 400 mg by mouth daily    ezetimibe (ZETIA) 10 mg tablet Take 0.5 tablets (5 mg total) by mouth daily    famotidine (PEPCID) 40 MG tablet TAKE 1 TABLET BY MOUTH EVERY DAY    finasteride (PROSCAR) 5 mg tablet Take 5 mg by mouth daily    hydrochlorothiazide (HYDRODIURIL) 12.5 mg tablet Take 12.5 mg by mouth daily    ipratropium (ATROVENT) 0.03 % nasal spray SPRAY 2 SPRAYS INTO EACH NOSTRIL EVERY 12 HOURS.    losartan (COZAAR) 50 mg tablet Take 50 mg by mouth daily    omega-3-acid ethyl esters (LOVAZA) 1 g capsule TAKE 2 CAPSULES (2 G TOTAL) BY MOUTH 2 (TWO) TIMES A DAY    pantoprazole (PROTONIX) 40 mg tablet Take 40 mg by mouth daily    PARoxetine (PAXIL) 20 mg tablet Take 10 mg by mouth daily    simvastatin (ZOCOR) 40 mg tablet Take 20 mg by mouth daily at bedtime Patient taking 20 mg, half of a 40 mg tablet. tamsulosin (FLOMAX) 0.4 mg Take 0.4 mg by mouth daily    triamcinolone (KENALOG) 0.1 % cream Apply topically 2 (two) times a day    [DISCONTINUED] fluticasone (FLONASE) 50 mcg/act nasal spray SPRAY 2 SPRAYS INTO EACH NOSTRIL EVERY DAY       Objective: There were no vitals taken for this visit. Physical Exam  HENT:      Head: Normocephalic and atraumatic. Pulmonary:      Effort: Pulmonary effort is normal.   Neurological:      Mental Status: He is alert and oriented to person, place, and time.    Psychiatric:         Behavior: Behavior normal.       Padma Lombardi MD

## 2023-12-08 NOTE — ASSESSMENT & PLAN NOTE
Home antigen test positive 12.6. 23. Patient within 5 day window however would like to attempt conservative therapy at this time. At this time I have recommended social distancing from family members who are currently asymptomatic. I have recommended using a mask while in common areas in the household. Frequent hand washing and cleaning of commonly used surfaces has also been reinforced for the patient and all family members. Strict ED precautions have been given in the event patient develops worsening SOB, fevers, and decreased PO intake. Have also advised the following:   - Good hydration: drink plenty of fluids to prevent dehydration.  - Rest: refrain from any strenuous exercise. - Fever and pain control: Tylenol 1000 mg, every 8 hours as needed OR Ibuprofen 600 mg, one tablet every 6 hours as needed.    - Cough: Bromfed cough suppressant   - Nasal congestion/ear pressure: Flonase

## 2024-07-01 ENCOUNTER — APPOINTMENT (EMERGENCY)
Dept: RADIOLOGY | Facility: HOSPITAL | Age: 79
End: 2024-07-01
Payer: COMMERCIAL

## 2024-07-01 ENCOUNTER — HOSPITAL ENCOUNTER (EMERGENCY)
Facility: HOSPITAL | Age: 79
Discharge: HOME | End: 2024-07-01
Attending: EMERGENCY MEDICINE
Payer: COMMERCIAL

## 2024-07-01 VITALS
OXYGEN SATURATION: 97 % | DIASTOLIC BLOOD PRESSURE: 57 MMHG | SYSTOLIC BLOOD PRESSURE: 115 MMHG | HEART RATE: 57 BPM | RESPIRATION RATE: 16 BRPM | TEMPERATURE: 97.3 F

## 2024-07-01 DIAGNOSIS — S62.639A DISTAL PHALANX OR PHALANGES, CLOSED FRACTURE: Primary | ICD-10-CM
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
## 2024-07-01 DIAGNOSIS — S22.32XA CLOSED FRACTURE OF ONE RIB OF LEFT SIDE, INITIAL ENCOUNTER: ICD-10-CM

## 2024-07-01 LAB
ATRIAL RATE: 53 BPM
P AXIS: 6 DEGREES
PR INTERVAL: 212 MS
QRS AXIS: 36 DEGREES
QRSD INTERVAL: 88 MS
QT INTERVAL: 440 MS
QTC INTERVAL: 412 MS
T WAVE AXIS: 48 DEGREES
VENTRICULAR RATE: 53 BPM

## 2024-07-01 PROCEDURE — 70450 CT HEAD/BRAIN W/O DYE: CPT

## 2024-07-01 PROCEDURE — 73130 X-RAY EXAM OF HAND: CPT

## 2024-07-01 PROCEDURE — 93005 ELECTROCARDIOGRAM TRACING: CPT

## 2024-07-01 PROCEDURE — 90715 TDAP VACCINE 7 YRS/> IM: CPT

## 2024-07-01 PROCEDURE — 72125 CT NECK SPINE W/O DYE: CPT

## 2024-07-01 PROCEDURE — 99284 EMERGENCY DEPT VISIT MOD MDM: CPT

## 2024-07-01 PROCEDURE — 93010 ELECTROCARDIOGRAM REPORT: CPT | Performed by: INTERNAL MEDICINE

## 2024-07-01 PROCEDURE — 71250 CT THORAX DX C-: CPT

## 2024-07-01 PROCEDURE — 99284 EMERGENCY DEPT VISIT MOD MDM: CPT | Performed by: EMERGENCY MEDICINE

## 2024-07-01 RX ORDER — OXYCODONE HYDROCHLORIDE 5 MG/1
5 TABLET ORAL ONCE
Status: COMPLETED | OUTPATIENT
Start: 2024-07-01 | End: 2024-07-01

## 2024-07-01 RX ORDER — OXYCODONE HYDROCHLORIDE 5 MG/1
5 TABLET ORAL EVERY 4 HOURS PRN
Qty: 12 TABLET | Refills: 0 | Status: SHIPPED | OUTPATIENT
Start: 2024-07-01

## 2024-07-01 RX ADMIN — TETANUS TOXOID, REDUCED DIPHTHERIA TOXOID AND ACELLULAR PERTUSSIS VACCINE, ADSORBED 0.5 ML: 5; 2.5; 8; 8; 2.5 SUSPENSION INTRAMUSCULAR at 13:17

## 2024-07-01 RX ADMIN — OXYCODONE HYDROCHLORIDE 5 MG: 5 TABLET ORAL at 13:55

## 2024-07-01 NOTE — DISCHARGE INSTRUCTIONS
You have been evaluated in the emergency department after a fall.  Your evaluation is showing a acute on chronic minimally displaced left anterior fifth rib fracture.  Given that this is an isolated rib fracture, you do not need to stay in the hospital for this.  Take the prescribed oxycodone as needed for pain, and use the provided incentive spirometer.  Return to the emergency department if breathing becomes more difficult, your pain becomes uncontrollable, you start to develop fevers worsening chest pain.

## 2024-07-01 NOTE — ED ATTENDING ATTESTATION
7/1/2024  I, Pako Guerrero MD, saw and evaluated the patient. I have discussed the patient with the resident/non-physician practitioner and agree with the resident's/non-physician practitioner's findings, Plan of Care, and MDM as documented in the resident's/non-physician practitioner's note, except where noted. All available labs and Radiology studies were reviewed.  I was present for key portions of any procedure(s) performed by the resident/non-physician practitioner and I was immediately available to provide assistance.       At this point I agree with the current assessment done in the Emergency Department.  I have conducted an independent evaluation of this patient a history and physical is as follows:    ED Course         Critical Care Time  Procedures    78 yo male with hx of stents on asa, had a mechanical fall after missing a step and hitting head. Pt with no loc. Pt with head trauma with abrasion, left hand, 4th finger bleeding, now controlled, right knee abrasion, and chest wall pain.  Pt with no sob. No prior syncope or lightheadedness. No chest pain or sob.  Vss, afebrile, lungs cta, rrr, abdomen soft nontender, right knee abrasion, full rom nvi, no neuro deficits, no spinal tenderness.  Ct head, cspine, ct chest, left hand xray.

## 2024-07-02 NOTE — ED PROVIDER NOTES
Emergency Department Trauma Note  Nader Laura 79 y.o. male MRN: [id number]  Unit/Bed#: ED 05/ED 05 Encounter: [id number]      Trauma Alert: Trauma Acuity: C  Model of Arrival: Mode of Arrival: Other (Comment) (walk in) via    Trauma Team: Current Providers  Attending Provider: Pako Guerrero MD  Resident: Hawk Ochoa MD  Registered Nurse: Wero Lopez RN  Registered Nurse: Glory Harmon  Consultants:     None      History of Present Illness     Chief Complaint:   Chief Complaint   Patient presents with    Fall     Pt was on the porch and missed a step and fell forward down 2 steps. +HS, +ASA, -LOC. C/o L rib pain, bilateral knee abrasions, and L ring finger pain/bleeding.     H  Mechanism:Details of Incident: Pt missed a step while walking down porch Injury Date: 07/01/24        Nader Laura is a 79 y.o. male with a past medical history of cardiac catheterization on aspirin presenting after mechanical fall after missing a step while going down steps backwards.  Patient hit his head on on his left face, just above his eyebrow, and has ecchymosis at that spot.  Patient also landed on his left hand, his fourth finger is bleeding around the cuticle, and he has bruising on the lateral aspect of his hypothenar eminence.  Patient denies syncope, prodrome, loss of consciousness.  Patient is complaining of pain above his left eyebrow, and his hand, and into his chest.  Patient has a history of left-sided rib fractures.  Denies any shortness of breath, lightheadedness, palpitations, or any other complaint on review of systems      Review of Systems   All other systems reviewed and are negative.      Historical Information     Immunizations:   Immunization History   Administered Date(s) Administered    COVID-19 PFIZER VACCINE 0.3 ML IM 01/12/2021, 02/05/2021, 12/10/2021    INFLUENZA 11/01/2008, 11/10/2009, 11/10/2015, 10/27/2016, 10/24/2018, 09/14/2019, 10/01/2019, 10/13/2021, 11/10/2022, 10/05/2023,  10/25/2023    Influenza Split High Dose Preservative Free IM 10/02/2013, 11/10/2015, 09/06/2017, 10/07/2019    Influenza, high dose seasonal 0.7 mL 10/24/2018, 10/02/2020    Influenza, seasonal, injectable 11/09/2011, 11/11/2014, 10/27/2016    Pneumococcal Conjugate 13-Valent 12/30/2014, 01/29/2020    Pneumococcal Polysaccharide PPV23 11/16/2016    Tdap 09/09/2013, 07/01/2024    Zoster Vaccine Recombinant 08/12/2019, 10/28/2019       Past Medical History:   Diagnosis Date    Abnormal electrocardiography     resolved: 11/21/2017    Abnormal laboratory test     last assessed: 11/16/2016    Acute myocardial infarction of inferior wall (HCC) 1/20/2015    Anxiety     Community acquired pneumonia     last assessed: 10/15/2015    Complete tear of left rotator cuff     unspecified laterality    Coronary artery disease     Depression     GERD (gastroesophageal reflux disease)     Hyperlipidemia     Hypertension     Impingement syndrome of left shoulder 3/9/2015    Inguinal mass     last assessed: 1/21/2015    Myocardial infarction (HCC)     Nontraumatic rupture of tendons of biceps (long head), left     last assessed: 1/2/2017    Right trigger finger     last assessed: 1/13/2015    Skin lesion     last assessed: 4/21/2016    Skin lesion of cheek     last assessed: 2/25/2016    Thrombocytopenia (HCC) 1/13/2015       Family History   Problem Relation Age of Onset    Other Mother         brain tumor    Coronary artery disease Mother     No Known Problems Father      Past Surgical History:   Procedure Laterality Date    CAROTID STENT      transcath placement of carotid artery stent    COLONOSCOPY      CORONARY ANGIOPLASTY      IA COLONOSCOPY FLX DX W/COLLJ SPEC WHEN PFRMD N/A 11/3/2017    Procedure: COLONOSCOPY;  Surgeon: JAH Henley MD;  Location: [facility];  Service: Colorectal    ROTATOR CUFF REPAIR Bilateral     SHOULDER SURGERY       Social History     Tobacco Use    Smoking status: Former     Current packs/day:  0.00     Average packs/day: 1 pack/day for 20.0 years (20.0 ttl pk-yrs)     Types: Cigarettes     Start date:      Quit date:      Years since quittin.5     Passive exposure: Past    Smokeless tobacco: Never   Vaping Use    Vaping status: Never Used   Substance Use Topics    Alcohol use: Yes    Drug use: No     E-Cigarette/Vaping    E-Cigarette Use Never User      E-Cigarette/Vaping Substances    Nicotine No     THC No     CBD No     Flavoring No        Family History: non-contributory    Meds/Allergies   Prior to Admission Medications   Prescriptions Last Dose Informant Patient Reported? Taking?   Coenzyme Q10 200 MG capsule  Self Yes No   Sig: Take 400 mg by mouth daily   PARoxetine (PAXIL) 20 mg tablet  Self Yes No   Sig: Take 10 mg by mouth daily   amoxicillin (AMOXIL) 500 mg capsule  Self Yes No   aspirin (ECOTRIN LOW STRENGTH) 81 mg EC tablet  Self No No   Sig: Take 1 tablet (81 mg total) by mouth 2 (two) times a day   atenolol (TENORMIN) 25 mg tablet  Self No No   Sig: Take 1 tablet (25 mg total) by mouth daily   brompheniramine-pseudoephedrine-DM 30-2-10 MG/5ML syrup   No No   Sig: Take 5 mL by mouth 4 (four) times a day as needed for allergies   cetirizine (ZyrTEC) 10 mg tablet   No No   Sig: Take 0.5 tablets (5 mg total) by mouth daily   ezetimibe (ZETIA) 10 mg tablet  Self No No   Sig: Take 0.5 tablets (5 mg total) by mouth daily   famotidine (PEPCID) 40 MG tablet  Self No No   Sig: TAKE 1 TABLET BY MOUTH EVERY DAY   finasteride (PROSCAR) 5 mg tablet  Self Yes No   Sig: Take 5 mg by mouth daily   fluticasone (FLONASE) 50 mcg/act nasal spray   No No   Si sprays into each nostril 2 (two) times a day   hydrochlorothiazide (HYDRODIURIL) 12.5 mg tablet  Self Yes No   Sig: Take 12.5 mg by mouth daily   ipratropium (ATROVENT) 0.03 % nasal spray  Self No No   Sig: SPRAY 2 SPRAYS INTO EACH NOSTRIL EVERY 12 HOURS.   losartan (COZAAR) 50 mg tablet  Self Yes No   Sig: Take 50 mg by mouth daily    omega-3-acid ethyl esters (LOVAZA) 1 g capsule  Self No No   Sig: TAKE 2 CAPSULES (2 G TOTAL) BY MOUTH 2 (TWO) TIMES A DAY   pantoprazole (PROTONIX) 40 mg tablet  Self Yes No   Sig: Take 40 mg by mouth daily   simvastatin (ZOCOR) 40 mg tablet  Self Yes No   Sig: Take 20 mg by mouth daily at bedtime Patient taking 20 mg, half of a 40 mg tablet.   tamsulosin (FLOMAX) 0.4 mg  Self Yes No   Sig: Take 0.4 mg by mouth daily   triamcinolone (KENALOG) 0.1 % cream  Self No No   Sig: Apply topically 2 (two) times a day      Facility-Administered Medications: None       Allergies   Allergen Reactions    Atorvastatin GI Intolerance       PHYSICAL EXAM    Objective   Vitals:   First set: Temperature: (!) 97.3 °F (36.3 °C) (07/01/24 1301)  Pulse: 60 (07/01/24 1301)  Respirations: 18 (07/01/24 1301)  Blood Pressure: 100/71 (07/01/24 1302)  SpO2: 98 % (07/01/24 1301)    Primary Survey:   (A) Airway: Intact  (B) Breathing: Clear to auscultation bilaterally  (C) Circulation: Pulses:   normal  (D) Disabliity:  GCS Total:  15  (E) Expose:  Completed    Secondary Survey: (Click on Physical Exam tab above)  Physical Exam  Vitals and nursing note reviewed.   Constitutional:       General: He is not in acute distress.     Appearance: He is well-developed.   HENT:      Head: Normocephalic and atraumatic.   Eyes:      Conjunctiva/sclera: Conjunctivae normal.   Cardiovascular:      Rate and Rhythm: Normal rate and regular rhythm.      Heart sounds: No murmur heard.  Pulmonary:      Effort: Pulmonary effort is normal. No respiratory distress.      Breath sounds: Normal breath sounds.   Abdominal:      Palpations: Abdomen is soft.      Tenderness: There is no abdominal tenderness.   Musculoskeletal:         General: Swelling and tenderness present.      Cervical back: Neck supple.      Comments: Tenderness to palpation on signs or symptoms worse in the left axilla at the inframammary fold  Tenderness to palpation along the lateral aspect of  his left hand, ecchymosis present  Tenderness to palpation to the left eyebrow, ecchymosis present   Skin:     General: Skin is warm and dry.      Capillary Refill: Capillary refill takes less than 2 seconds.   Neurological:      Mental Status: He is alert.   Psychiatric:         Mood and Affect: Mood normal.         Cervical spine cleared by clinical criteria? Yes     Invasive Devices       Peripheral Intravenous Line  Duration             Peripheral IV 05/11/22 Right Antecubital 782 days                    Lab Results:   Results Reviewed       None                   Imaging Studies:   Direct to CT: Yes  XR hand 3+ views LEFT   Final Result by Romain Wei MD (07/01 1638)      Acute fracture through the dorsal base of the left fourth distal phalanx.         Computerized Assisted Algorithm (CAA) may have been used to analyze all applicable images.         Workstation performed: [id number]         CT head without contrast   Final Result by Max Duke DO (07/01 1437)      No acute intracranial abnormality.                  Workstation performed: [id number]         CT spine cervical without contrast   Final Result by Max Duke DO (07/01 1440)      Prior to advanced degenerative changes are seen resulting in 5 mm degenerative anterolisthesis at C7-T1. No fracture identified.                  Workstation performed: [id number]         CT chest wo contrast   Final Result by Josh Gastelum DO (07/01 1458)      Probable acute on chronic, minimally displaced left anterior fifth rib fracture. Correlate with point tenderness. No pneumothorax.      Mild emphysema. Tiny pulmonary nodules as above. Based on current Fleischner Society 2017 Guidelines on incidental pulmonary nodule, optional follow-up CT at 12 months can be considered.      The study was marked in EPIC for immediate notification.      Resident: EVA MARTINEZ I, the attending radiologist, have reviewed the images and agree with the final  report above.      Workstation performed: [id number]               Procedures  Procedures         ED Course           Medical Decision Making  Given patient fall, unwitnessed injury baseline elected not to see trauma.  Will CT head, C-spine, chest to evaluate for rib fractures, additionally will x-ray left hand given following on left hand.  Update tenderness, clean, wrapped, and splint left fourth finger    CT shows no acute findings in head or C-spine.  CT of the chest is positive for a left-sided rib fracture.  Patient pain controlled, no increased work of breathing or shortness of breath.  Patient pulled 1500 mL on incentive spirometer.  Will discharge patient home with return precautions.  Patient expressed understanding of the return precautions and follow-up instructions.    Patient x-ray noted to have a fracture of the fourth distal phalanges on the left.  Patient called and updated of the x-ray findings.  Referral written to orthopedic surgery, instructed patient to continue wearing his finger in the splint he was discharged home.  Return precautions discussed, all questions answered    Amount and/or Complexity of Data Reviewed  Radiology: ordered.    Risk  Prescription drug management.                Disposition  Priority One Transfer: No  Final diagnoses:   Closed fracture of one rib of left side, initial encounter   Fall, initial encounter   Distal phalanx or phalanges, closed fracture     Time reflects when diagnosis was documented in both MDM as applicable and the Disposition within this note       Time User Action Codes Description Comment    7/1/2024  3:22 PM Pako Guerrero Add [S22.32XA] Closed fracture of one rib of left side, initial encounter     7/1/2024  3:40 PM Hawk Ochoa Add [W19.XXXA] Fall, initial encounter     7/1/2024  4:48 PM Hawk Ochoa Modify [S22.32XA] Closed fracture of one rib of left side, initial encounter     7/1/2024  4:48 PM Hawk Ochoa Add [S62.639A] Distal  phalanx or phalanges, closed fracture           ED Disposition       ED Disposition   Discharge    Condition   Stable    Date/Time   Mon Jul 1, 2024  3:39 PM    Comment   Nader Laura discharge to home/self care.                   Follow-up Information       Follow up With Specialties Details Why Contact Info    Abel Bennett MD Orthopedic Surgery, Hand Surgery   [address] [place] [address] [id number] [phone]            Discharge Medication List as of 7/1/2024  3:42 PM        START taking these medications    Details   oxyCODONE (Roxicodone) 5 immediate release tablet Take 1 tablet (5 mg total) by mouth every 4 (four) hours as needed for moderate pain Max Daily Amount: 30 mg, Starting Mon 7/1/2024, Normal           CONTINUE these medications which have NOT CHANGED    Details   amoxicillin (AMOXIL) 500 mg capsule Historical Med      aspirin (ECOTRIN LOW STRENGTH) 81 mg EC tablet Take 1 tablet (81 mg total) by mouth 2 (two) times a day, Starting Fri 8/5/2022, No Print      atenolol (TENORMIN) 25 mg tablet Take 1 tablet (25 mg total) by mouth daily, Starting Mon 11/4/2019, Normal      brompheniramine-pseudoephedrine-DM 30-2-10 MG/5ML syrup Take 5 mL by mouth 4 (four) times a day as needed for allergies, Starting Fri 12/8/2023, Normal      cetirizine (ZyrTEC) 10 mg tablet Take 0.5 tablets (5 mg total) by mouth daily, Starting Fri 12/8/2023, Normal      Coenzyme Q10 200 MG capsule Take 400 mg by mouth daily, Historical Med      ezetimibe (ZETIA) 10 mg tablet Take 0.5 tablets (5 mg total) by mouth daily, Starting Fri 8/5/2022, No Print      famotidine (PEPCID) 40 MG tablet TAKE 1 TABLET BY MOUTH EVERY DAY, Starting Mon 10/23/2023, Normal      finasteride (PROSCAR) 5 mg tablet Take 5 mg by mouth daily, Starting Mon 9/25/2023, Historical Med      fluticasone (FLONASE) 50 mcg/act nasal spray 2 sprays into each nostril 2 (two) times a day, Starting Fri 12/8/2023, Normal      hydrochlorothiazide (HYDRODIURIL) 12.5  mg tablet Take 12.5 mg by mouth daily, Historical Med      ipratropium (ATROVENT) 0.03 % nasal spray SPRAY 2 SPRAYS INTO EACH NOSTRIL EVERY 12 HOURS., Starting Mon 10/2/2023, Normal      losartan (COZAAR) 50 mg tablet Take 50 mg by mouth daily, Historical Med      omega-3-acid ethyl esters (LOVAZA) 1 g capsule TAKE 2 CAPSULES (2 G TOTAL) BY MOUTH 2 (TWO) TIMES A DAY, Starting Mon 12/9/2019, Normal      pantoprazole (PROTONIX) 40 mg tablet Take 40 mg by mouth daily, Historical Med      PARoxetine (PAXIL) 20 mg tablet Take 10 mg by mouth daily, Historical Med      simvastatin (ZOCOR) 40 mg tablet Take 20 mg by mouth daily at bedtime Patient taking 20 mg, half of a 40 mg tablet., Historical Med      tamsulosin (FLOMAX) 0.4 mg Take 0.4 mg by mouth daily, Starting Tue 6/29/2021, Historical Med      triamcinolone (KENALOG) 0.1 % cream Apply topically 2 (two) times a day, Starting Tue 2/7/2023, Normal               PDMP Review       None            ED Provider  Electronically Signed by           Hawk Ochoa MD  07/02/24 5614

## 2024-07-03 ENCOUNTER — OFFICE VISIT (OUTPATIENT)
Dept: OBGYN CLINIC | Facility: CLINIC | Age: 79
End: 2024-07-03
Payer: COMMERCIAL

## 2024-07-03 VITALS — HEIGHT: 68 IN | BODY MASS INDEX: 29.7 KG/M2 | WEIGHT: 196 LBS

## 2024-07-03 DIAGNOSIS — M20.012 MALLET FINGER OF LEFT HAND: Primary | ICD-10-CM

## 2024-07-03 DIAGNOSIS — S62.639A DISTAL PHALANX OR PHALANGES, CLOSED FRACTURE: ICD-10-CM

## 2024-07-03 PROCEDURE — 99203 OFFICE O/P NEW LOW 30 MIN: CPT | Performed by: SURGERY

## 2024-07-03 NOTE — PROGRESS NOTES
ORTHOPAEDIC HAND, WRIST, AND ELBOW OFFICE  VISIT       ASSESSMENT/PLAN:      79 y.o. year old male who presents with Left Ring finger mallet fracture    XR were reviewed. We discussed the findings  We discussed treatment for Mallet finger  Staxx splint was provided with instructions to don and doff the splint  NSAID's as needed for pain    The patient verbalized understanding of exam findings and treatment plan. We engaged in the shared decision-making process and treatment options were discussed at length with the patient. Surgical and conservative management discussed today along with risks and benefits.    Diagnoses and all orders for this visit:    Mallet finger of left hand    Distal phalanx or phalanges, closed fracture  -     Ambulatory Referral to Orthopedic Surgery        Follow Up:  Return in about 6 weeks (around 8/14/2024) for Recheck.    To Do Next Visit:  Re-evaluation of current issue      General Discussions:  Mallet Finger: The anatomy and physiology of a mallet finger was discussed with the patient today.  Typically, the extensor tendon is torn off of the dorsal aspect of the distal phalanx.  This results in a flexed posture of the distal interphalangeal joint, with incomplete extension (ie. A drooped finger).  Typically this is treated through conservative measures.  An extension block splint is worn over the distal interphalangeal joint for 6-8 weeks continuously, followed by 4-6 weeks of nocturnal use.  After healing, there is typically a small flexion deformity at the distal interphalangeal joint.  Surgery does not typically change these results.          ____________________________________________________________________________________________________________________________________________      CHIEF COMPLAINT:  Chief Complaint   Patient presents with    Left Hand - Pain     Ring finger   Fell 7/1       SUBJECTIVE:  Nader Laura is a 79 y.o. year old  male who presents Left hand pain DOI  7/1/2024      Patient states he was at a job site when he went to step off the steps sideways while turning around , missing the top step. He states he believed he went to place his hand down and then landed on his hand breaking ribs and injuring his hand  He was seen in the  ED. ED note reviewed  He presents today with Alumifoam splint      I have personally reviewed all the relevant PMH, PSH, SH, FH, Medications and allergies      PAST MEDICAL HISTORY:  Past Medical History:   Diagnosis Date    Abnormal electrocardiography     resolved: 11/21/2017    Abnormal laboratory test     last assessed: 11/16/2016    Acute myocardial infarction of inferior wall (HCC) 1/20/2015    Anxiety     Community acquired pneumonia     last assessed: 10/15/2015    Complete tear of left rotator cuff     unspecified laterality    Coronary artery disease     Depression     GERD (gastroesophageal reflux disease)     Hyperlipidemia     Hypertension     Impingement syndrome of left shoulder 3/9/2015    Inguinal mass     last assessed: 1/21/2015    Myocardial infarction (HCC)     Nontraumatic rupture of tendons of biceps (long head), left     last assessed: 1/2/2017    Right trigger finger     last assessed: 1/13/2015    Skin lesion     last assessed: 4/21/2016    Skin lesion of cheek     last assessed: 2/25/2016    Thrombocytopenia (HCC) 1/13/2015       PAST SURGICAL HISTORY:  Past Surgical History:   Procedure Laterality Date    CAROTID STENT      transcath placement of carotid artery stent    COLONOSCOPY      CORONARY ANGIOPLASTY      MS COLONOSCOPY FLX DX W/COLLJ SPEC WHEN PFRMD N/A 11/3/2017    Procedure: COLONOSCOPY;  Surgeon: JAH Henley MD;  Location: [facility];  Service: Colorectal    ROTATOR CUFF REPAIR Bilateral     SHOULDER SURGERY         FAMILY HISTORY:  Family History   Problem Relation Age of Onset    Other Mother         brain tumor    Coronary artery disease Mother     No Known Problems Father        SOCIAL  HISTORY:  Social History     Tobacco Use    Smoking status: Former     Current packs/day: 0.00     Average packs/day: 1 pack/day for 20.0 years (20.0 ttl pk-yrs)     Types: Cigarettes     Start date:      Quit date:      Years since quittin.5     Passive exposure: Past    Smokeless tobacco: Never   Vaping Use    Vaping status: Never Used   Substance Use Topics    Alcohol use: Yes    Drug use: No       MEDICATIONS:    Current Outpatient Medications:     amoxicillin (AMOXIL) 500 mg capsule, , Disp: , Rfl:     aspirin (ECOTRIN LOW STRENGTH) 81 mg EC tablet, Take 1 tablet (81 mg total) by mouth 2 (two) times a day, Disp: , Rfl:     atenolol (TENORMIN) 25 mg tablet, Take 1 tablet (25 mg total) by mouth daily, Disp: 90 tablet, Rfl: 1    brompheniramine-pseudoephedrine-DM 30-2-10 MG/5ML syrup, Take 5 mL by mouth 4 (four) times a day as needed for allergies, Disp: 120 mL, Rfl: 0    cetirizine (ZyrTEC) 10 mg tablet, Take 0.5 tablets (5 mg total) by mouth daily, Disp: 10 tablet, Rfl: 0    Coenzyme Q10 200 MG capsule, Take 400 mg by mouth daily, Disp: , Rfl:     ezetimibe (ZETIA) 10 mg tablet, Take 0.5 tablets (5 mg total) by mouth daily, Disp: , Rfl:     famotidine (PEPCID) 40 MG tablet, TAKE 1 TABLET BY MOUTH EVERY DAY, Disp: 90 tablet, Rfl: 2    finasteride (PROSCAR) 5 mg tablet, Take 5 mg by mouth daily, Disp: , Rfl:     fluticasone (FLONASE) 50 mcg/act nasal spray, 2 sprays into each nostril 2 (two) times a day, Disp: 48 mL, Rfl: 1    hydrochlorothiazide (HYDRODIURIL) 12.5 mg tablet, Take 12.5 mg by mouth daily, Disp: , Rfl:     ipratropium (ATROVENT) 0.03 % nasal spray, SPRAY 2 SPRAYS INTO EACH NOSTRIL EVERY 12 HOURS., Disp: 30 mL, Rfl: 1    losartan (COZAAR) 50 mg tablet, Take 50 mg by mouth daily, Disp: , Rfl:     omega-3-acid ethyl esters (LOVAZA) 1 g capsule, TAKE 2 CAPSULES (2 G TOTAL) BY MOUTH 2 (TWO) TIMES A DAY, Disp: 360 capsule, Rfl: 3    oxyCODONE (Roxicodone) 5 immediate release tablet, Take 1  tablet (5 mg total) by mouth every 4 (four) hours as needed for moderate pain Max Daily Amount: 30 mg, Disp: 12 tablet, Rfl: 0    pantoprazole (PROTONIX) 40 mg tablet, Take 40 mg by mouth daily, Disp: , Rfl:     PARoxetine (PAXIL) 20 mg tablet, Take 10 mg by mouth daily, Disp: , Rfl:     simvastatin (ZOCOR) 40 mg tablet, Take 20 mg by mouth daily at bedtime Patient taking 20 mg, half of a 40 mg tablet., Disp: , Rfl:     tamsulosin (FLOMAX) 0.4 mg, Take 0.4 mg by mouth daily, Disp: , Rfl:     triamcinolone (KENALOG) 0.1 % cream, Apply topically 2 (two) times a day, Disp: 80 g, Rfl: 2    ALLERGIES:  Allergies   Allergen Reactions    Atorvastatin GI Intolerance           REVIEW OF SYSTEMS:  Review of Systems   Constitutional:  Negative for chills and fever.   HENT:  Negative for ear pain and sore throat.    Eyes:  Negative for pain and visual disturbance.   Respiratory:  Negative for cough and shortness of breath.    Cardiovascular:  Negative for chest pain and palpitations.   Gastrointestinal:  Negative for abdominal pain and vomiting.   Genitourinary:  Negative for dysuria and hematuria.   Musculoskeletal:  Negative for arthralgias and back pain.   Skin:  Negative for color change and rash.   Neurological:  Negative for seizures and syncope.   All other systems reviewed and are negative.      VITALS:  There were no vitals filed for this visit.    LABS:  HgA1c:   Lab Results   Component Value Date    HGBA1C 5.6 08/26/2021     BMP:   Lab Results   Component Value Date    GLUCOSE 98 12/03/2015    CALCIUM 9.1 02/20/2023     12/03/2015    K 4.3 02/20/2023    CO2 26 02/20/2023     02/20/2023    BUN 21 08/18/2023    CREATININE 1.01 08/18/2023       _____________________________________________________  PHYSICAL EXAMINATION:  General: well developed and well nourished, alert, oriented times 3, and appears comfortable  Psychiatric: Normal  HEENT: Normocephalic, Atraumatic Trachea Midline, No  torticollis  Pulmonary: No audible wheezing or respiratory distress   Abdomen/GI: Non tender, non distended   Cardiovascular: No pitting edema, 2+ radial pulse   Skin: No masses, erythema, lacerations, fluctation, ulcerations  Neurovascular: Sensation Intact to the Median, Ulnar, Radial Nerve, Motor Intact to the Median, Ulnar, Radial Nerve, and Pulses Intact  Musculoskeletal: Normal, except as noted in detailed exam and in HPI.      MUSCULOSKELETAL EXAMINATION:  Right hand:  SILT  Composite fist    Left hand:  SILT    There is a small laceration across the nail bed but no drainage and no signs of infections  ___________________________________________________  STUDIES REVIEWED:  I have personally reviewed AP lateral and oblique radiographs of Left hand 7/1/2024   which demonstrate    FINDINGS:     There is an acute fracture along the dorsal base of the fourth distal phalanx. The fracture fragment appears rotated.     No significant degenerative changes.     No lytic or blastic osseous lesion.     Unremarkable soft tissues.     IMPRESSION:     Acute fracture through the dorsal base of the left fourth distal phalanx.        PROCEDURES PERFORMED:  Procedures  No Procedures performed today    _____________________________________________________      Scribe Attestation      I,:  Omar Garsia am acting as a scribe while in the presence of the attending physician.:       I,:  Henrik Scott MD personally performed the services described in this documentation    as scribed in my presence.:

## 2024-08-08 ENCOUNTER — TELEPHONE (OUTPATIENT)
Dept: OBGYN CLINIC | Facility: HOSPITAL | Age: 79
End: 2024-08-08

## 2024-08-08 NOTE — TELEPHONE ENCOUNTER
Caller: Patient's spouse Anabel    Doctor: Tyler    Reason for call: Anabel is calling because Nader's finger that you saw him for is not healing. It is very red & inflamed, swollen, and oozing a little. He doesn't have an appt until 8/14/24 and she thinks he might need to be seen sooner. She wants to know if he could be added to your schedule earlier or if there is something that he can do until he sees you. Please call spouse Anabel.    Call back#: [phone]

## 2024-08-09 ENCOUNTER — OFFICE VISIT (OUTPATIENT)
Dept: URGENT CARE | Age: 79
End: 2024-08-09
Payer: COMMERCIAL

## 2024-08-09 VITALS
HEART RATE: 58 BPM | HEIGHT: 69 IN | OXYGEN SATURATION: 97 % | BODY MASS INDEX: 31.4 KG/M2 | SYSTOLIC BLOOD PRESSURE: 124 MMHG | WEIGHT: 212 LBS | RESPIRATION RATE: 18 BRPM | TEMPERATURE: 96.9 F | DIASTOLIC BLOOD PRESSURE: 78 MMHG

## 2024-08-09 DIAGNOSIS — S62.665D CLOSED NONDISPLACED FRACTURE OF DISTAL PHALANX OF LEFT RING FINGER WITH ROUTINE HEALING, SUBSEQUENT ENCOUNTER: Primary | ICD-10-CM

## 2024-08-09 PROCEDURE — S9083 URGENT CARE CENTER GLOBAL: HCPCS | Performed by: FAMILY MEDICINE

## 2024-08-09 PROCEDURE — G0382 LEV 3 HOSP TYPE B ED VISIT: HCPCS | Performed by: FAMILY MEDICINE

## 2024-08-09 RX ORDER — CEPHALEXIN 500 MG/1
500 CAPSULE ORAL EVERY 6 HOURS SCHEDULED
Qty: 40 CAPSULE | Refills: 0 | Status: SHIPPED | OUTPATIENT
Start: 2024-08-09 | End: 2024-08-14 | Stop reason: ALTCHOICE

## 2024-08-09 NOTE — PROGRESS NOTES
[facility]        NAME: Nader Laura is a 79 y.o. male  : 1945    MRN: [id number]  DATE: 2024  TIME: 2:41 PM    Assessment and Plan   Closed nondisplaced fracture of distal phalanx of left ring finger with routine healing, subsequent encounter [Z49.010X]  1. Closed nondisplaced fracture of distal phalanx of left ring finger with routine healing, subsequent encounter  cephalexin (KEFLEX) 500 mg capsule        Likely skin infection based on exam.  However, I was unable to do a thorough palpation for fluctuance due to underlying fracture. Discussed risks/benefits and agreed to antibiotics with ortho hand follow up.   Patient Instructions       Follow up with PCP in 3-5 days.  Proceed to  ER if symptoms worsen.    If tests have been performed at [facility], our office will contact you with results if changes need to be made to the care plan discussed with you at the visit.  You can review your full results on [facility] MyChart.    Chief Complaint     Chief Complaint   Patient presents with   • Finger Injury     Pt states he had a fall approx 6 weeks ago. Seen and treated in ER. Wound to left ring finger is not healing well.          History of Present Illness       Patient fell 6 weeks ago. Was a trauma work up. At that time had a distal phalanx fracture and was put in a splint. At that time, he also had a large abrasion of that same finger. Over the past few days, the finger has become more swollen, more red, and he thinks there has been some drainage. No f/c/n/v or other systemic symptoms. Called hand ortho but appt isn't until next week so came to  for eval.        Review of Systems   Review of Systems   Constitutional:  Negative for activity change, fatigue, fever and unexpected weight change.   HENT:  Negative for congestion.    Eyes:  Negative for visual disturbance.   Respiratory:  Negative for cough, shortness of breath and wheezing.    Cardiovascular:  Negative for chest pain,  palpitations and leg swelling.   Gastrointestinal:  Negative for abdominal pain, constipation and diarrhea.   Musculoskeletal:  Negative for arthralgias.   Skin:  Positive for wound.   Psychiatric/Behavioral:  Negative for dysphoric mood and suicidal ideas.          Current Medications       Current Outpatient Medications:   •  cephalexin (KEFLEX) 500 mg capsule, Take 1 capsule (500 mg total) by mouth every 6 (six) hours for 10 days, Disp: 40 capsule, Rfl: 0  •  amoxicillin (AMOXIL) 500 mg capsule, , Disp: , Rfl:   •  aspirin (ECOTRIN LOW STRENGTH) 81 mg EC tablet, Take 1 tablet (81 mg total) by mouth 2 (two) times a day, Disp: , Rfl:   •  atenolol (TENORMIN) 25 mg tablet, Take 1 tablet (25 mg total) by mouth daily, Disp: 90 tablet, Rfl: 1  •  brompheniramine-pseudoephedrine-DM 30-2-10 MG/5ML syrup, Take 5 mL by mouth 4 (four) times a day as needed for allergies, Disp: 120 mL, Rfl: 0  •  cetirizine (ZyrTEC) 10 mg tablet, Take 0.5 tablets (5 mg total) by mouth daily, Disp: 10 tablet, Rfl: 0  •  Coenzyme Q10 200 MG capsule, Take 400 mg by mouth daily, Disp: , Rfl:   •  ezetimibe (ZETIA) 10 mg tablet, Take 0.5 tablets (5 mg total) by mouth daily, Disp: , Rfl:   •  famotidine (PEPCID) 40 MG tablet, TAKE 1 TABLET BY MOUTH EVERY DAY, Disp: 90 tablet, Rfl: 2  •  finasteride (PROSCAR) 5 mg tablet, Take 5 mg by mouth daily, Disp: , Rfl:   •  fluticasone (FLONASE) 50 mcg/act nasal spray, 2 sprays into each nostril 2 (two) times a day, Disp: 48 mL, Rfl: 1  •  hydrochlorothiazide (HYDRODIURIL) 12.5 mg tablet, Take 12.5 mg by mouth daily, Disp: , Rfl:   •  ipratropium (ATROVENT) 0.03 % nasal spray, SPRAY 2 SPRAYS INTO EACH NOSTRIL EVERY 12 HOURS., Disp: 30 mL, Rfl: 1  •  losartan (COZAAR) 50 mg tablet, Take 50 mg by mouth daily, Disp: , Rfl:   •  omega-3-acid ethyl esters (LOVAZA) 1 g capsule, TAKE 2 CAPSULES (2 G TOTAL) BY MOUTH 2 (TWO) TIMES A DAY, Disp: 360 capsule, Rfl: 3  •  oxyCODONE (Roxicodone) 5 immediate release  tablet, Take 1 tablet (5 mg total) by mouth every 4 (four) hours as needed for moderate pain Max Daily Amount: 30 mg, Disp: 12 tablet, Rfl: 0  •  pantoprazole (PROTONIX) 40 mg tablet, Take 40 mg by mouth daily, Disp: , Rfl:   •  PARoxetine (PAXIL) 20 mg tablet, Take 10 mg by mouth daily, Disp: , Rfl:   •  simvastatin (ZOCOR) 40 mg tablet, Take 20 mg by mouth daily at bedtime Patient taking 20 mg, half of a 40 mg tablet., Disp: , Rfl:   •  tamsulosin (FLOMAX) 0.4 mg, Take 0.4 mg by mouth daily, Disp: , Rfl:   •  triamcinolone (KENALOG) 0.1 % cream, Apply topically 2 (two) times a day, Disp: 80 g, Rfl: 2    Current Allergies     Allergies as of 08/09/2024 - Reviewed 08/09/2024   Allergen Reaction Noted   • Atorvastatin GI Intolerance 01/20/2015            The following portions of the patient's history were reviewed and updated as appropriate: allergies, current medications, past family history, past medical history, past social history, past surgical history and problem list.     Past Medical History:   Diagnosis Date   • Abnormal electrocardiography     resolved: 11/21/2017   • Abnormal laboratory test     last assessed: 11/16/2016   • Acute myocardial infarction of inferior wall (HCC) 1/20/2015   • Anxiety    • Community acquired pneumonia     last assessed: 10/15/2015   • Complete tear of left rotator cuff     unspecified laterality   • Coronary artery disease    • Depression    • GERD (gastroesophageal reflux disease)    • Hyperlipidemia    • Hypertension    • Impingement syndrome of left shoulder 3/9/2015   • Inguinal mass     last assessed: 1/21/2015   • Myocardial infarction (formerly [facility])    • Nontraumatic rupture of tendons of biceps (long head), left     last assessed: 1/2/2017   • Right trigger finger     last assessed: 1/13/2015   • Skin lesion     last assessed: 4/21/2016   • Skin lesion of cheek     last assessed: 2/25/2016   • Thrombocytopenia (formerly [facility]) 1/13/2015       Past Surgical History:   Procedure Laterality  "Date   • CAROTID STENT      transcath placement of carotid artery stent   • COLONOSCOPY     • CORONARY ANGIOPLASTY     • GA COLONOSCOPY FLX DX W/COLLJ SPEC WHEN PFRMD N/A 11/3/2017    Procedure: COLONOSCOPY;  Surgeon: JAH Henley MD;  Location: [facility];  Service: Colorectal   • ROTATOR CUFF REPAIR Bilateral    • SHOULDER SURGERY         Family History   Problem Relation Age of Onset   • Other Mother         brain tumor   • Coronary artery disease Mother    • No Known Problems Father          Medications have been verified.        Objective   /78   Pulse 58   Temp (!) 96.9 °F (36.1 °C)   Resp 18   Ht 5' 9\" (1.753 m)   Wt 96.2 kg (212 lb)   SpO2 97%   BMI 31.31 kg/m²   No LMP for male patient.       Physical Exam     Physical Exam  Constitutional:       Appearance: He is well-developed.   HENT:      Head: Normocephalic.   Cardiovascular:      Rate and Rhythm: Normal rate.   Pulmonary:      Effort: Pulmonary effort is normal.      Breath sounds: No wheezing.   Musculoskeletal:         General: No edema.      Comments: Left 4th digit with edema, small amount of fluctuance around the nail bed, redness and warmth. Photo in media   Skin:     General: Skin is warm and dry.      Capillary Refill: Capillary refill takes less than 2 seconds.   Neurological:      Mental Status: He is alert and oriented to person, place, and time.   Psychiatric:         Mood and Affect: Mood and affect normal.                   "

## 2024-08-14 ENCOUNTER — OFFICE VISIT (OUTPATIENT)
Dept: INTERNAL MEDICINE CLINIC | Age: 79
End: 2024-08-14
Payer: COMMERCIAL

## 2024-08-14 ENCOUNTER — OFFICE VISIT (OUTPATIENT)
Dept: OBGYN CLINIC | Facility: CLINIC | Age: 79
End: 2024-08-14
Payer: COMMERCIAL

## 2024-08-14 VITALS
HEART RATE: 64 BPM | WEIGHT: 213 LBS | SYSTOLIC BLOOD PRESSURE: 120 MMHG | DIASTOLIC BLOOD PRESSURE: 76 MMHG | OXYGEN SATURATION: 96 % | BODY MASS INDEX: 33.43 KG/M2 | HEIGHT: 67 IN | TEMPERATURE: 98.2 F

## 2024-08-14 VITALS
WEIGHT: 212 LBS | BODY MASS INDEX: 31.4 KG/M2 | SYSTOLIC BLOOD PRESSURE: 136 MMHG | HEIGHT: 69 IN | DIASTOLIC BLOOD PRESSURE: 78 MMHG

## 2024-08-14 DIAGNOSIS — M20.012 MALLET FINGER OF LEFT HAND: Primary | ICD-10-CM

## 2024-08-14 DIAGNOSIS — G95.19 OTHER VASCULAR MYELOPATHIES (HCC): ICD-10-CM

## 2024-08-14 DIAGNOSIS — M48.07 LUMBOSACRAL STENOSIS WITH NEUROGENIC CLAUDICATION: ICD-10-CM

## 2024-08-14 DIAGNOSIS — R73.01 IMPAIRED FASTING GLUCOSE: Primary | ICD-10-CM

## 2024-08-14 DIAGNOSIS — I10 PRIMARY HYPERTENSION: ICD-10-CM

## 2024-08-14 DIAGNOSIS — F41.1 GAD (GENERALIZED ANXIETY DISORDER): ICD-10-CM

## 2024-08-14 DIAGNOSIS — E78.2 MIXED HYPERLIPIDEMIA: ICD-10-CM

## 2024-08-14 DIAGNOSIS — Z86.010 HISTORY OF COLON POLYPS: ICD-10-CM

## 2024-08-14 DIAGNOSIS — E55.9 VITAMIN D DEFICIENCY: ICD-10-CM

## 2024-08-14 PROCEDURE — 99213 OFFICE O/P EST LOW 20 MIN: CPT | Performed by: SURGERY

## 2024-08-14 PROCEDURE — 99215 OFFICE O/P EST HI 40 MIN: CPT | Performed by: PHYSICIAN ASSISTANT

## 2024-08-14 NOTE — PROGRESS NOTES
Assessment    Left ring finger bony mallet  Inflamed tissue of the nailfold  DOI:  7/1/2024      Plan    Transition to nighttime splinting only.  May work on gentle range of motion of the DIP joint throughout the day.  Finish course of oral antibiotics.  Warm soapy soaks twice daily is recommended.  Follow-up in 2 weeks for reevaluation.        Subjective     HPI    Patient ID:  Nader Laura is here for follow-up of the left ring bony mallet injury.  He recently was seen by urgent care for concerns of infection.  The skin around the nail fold is peeling off with some erythema and drainage so he was placed on Keflex.  Today he states he otherwise has been mostly splint compliant but has taken it off at times.  No fever or chills.        The following portions of the patient's history were reviewed and updated as appropriate: allergies, current medications, past family history, past medical history, past social history, past surgical history, and problem list.    Review of Systems     Objective    Imaging: No new imaging performed for today's visit.    Physical Exam     Vitals:    08/14/24 0923   BP: 136/78       General appearance:  NAD   Cardiac:  Regular rate  Lungs:  Unlabored breathing  Abdomen:  Non-distended    Orthopedic Examination:  Left ring finger    Inspection: Peeling raw skin at the nail fold with mild erythema.  With very slight flexion deformity at the DIP joint.  No drainage.  No marked swelling.    Palpation: Mild tenderness to palpation at the nail fold region.  No palpable fluctuance of the inflamed area.    Range-of-motion: Able to fully extend at the DIP joint however it rests with very slight extension lag.    Strength: Deferred    Sensation: Intact to light touch    Special Tests: Good cap refill at the fingertip  Palpable radial pulse

## 2024-08-14 NOTE — PROGRESS NOTES
Assessment/Plan:         Diagnoses and all orders for this visit:    Impaired fasting glucose  Comments:  check labs, low carb diet  Orders:  -     CBC and differential; Future  -     Comprehensive metabolic panel; Future    History of colon polyps  -     Cancel: Ambulatory Referral to Gastroenterology; Future  -     Ambulatory Referral to Colorectal Surgery; Future    Other vascular myelopathies (HCC)  -     CBC and differential; Future  -     Comprehensive metabolic panel; Future  -     TSH, 3rd generation; Future  -     Hemoglobin A1C; Future    Mixed hyperlipidemia  Comments:  continue statin  low chol diet  check labs  Orders:  -     CBC and differential; Future  -     Comprehensive metabolic panel; Future  -     Lipid panel; Future  -     TSH, 3rd generation; Future  -     Hemoglobin A1C; Future    Primary hypertension  -     CBC and differential; Future  -     Comprehensive metabolic panel; Future  -     Lipid panel; Future  -     TSH, 3rd generation; Future  -     Hemoglobin A1C; Future    ARIELLE (generalized anxiety disorder)  -     CBC and differential; Future  -     Comprehensive metabolic panel; Future  -     Lipid panel; Future  -     TSH, 3rd generation; Future  -     Hemoglobin A1C; Future    Vitamin D deficiency  -     Vitamin D 25 hydroxy; Future    Lumbosacral stenosis with neurogenic claudication  -     Ambulatory Referral to [facility]; Future        To schedule f/u with Dr Wang - due as of 11/23  Pt follows with Dr Deluca (urology) pt believes he has f/u in the next month or so but will check calendar at home - due for PSA as of 8/18/24    Subjective:      Patient ID: Nader Laura is a 79 y.o. male.    80 y/o male with hx of lumbar stenosis, hld, cad, htn, bph presents to establish care     Pt reports L hip replacement in 2022 - since then has had low back pain   Had MRI in 2022 and saw pain management in past - had epidural which he states didn't really help   Pt states  "his back feels so tight he can't do home exercises bc he doesn't feel that they help  Pt feels \"unsteady at times\"   He would like to consider formal PT at this time   Would recommend f/u with pain management if persists    Pt to schedule f/u with cardio and urology  Due for PSA in August     BP well controlled  Will order labs to be completed soon           The following portions of the patient's history were reviewed and updated as appropriate: allergies, current medications, past family history, past medical history, past social history, past surgical history, and problem list.    Review of Systems   Constitutional:  Negative for activity change, appetite change, chills, diaphoresis, fatigue and fever.   HENT:  Negative for congestion and sore throat.    Eyes:  Negative for pain, redness and visual disturbance.   Respiratory:  Negative for cough, shortness of breath and wheezing.    Cardiovascular:  Negative for chest pain, palpitations and leg swelling.   Gastrointestinal:  Negative for abdominal pain, constipation, diarrhea and nausea.   Genitourinary:  Negative for dysuria.        Nocturia - 3-4 x / night (not new concern)   Musculoskeletal:  Positive for arthralgias and back pain. Negative for gait problem.   Skin:  Negative for rash and wound.   Allergic/Immunologic: Negative for environmental allergies.   Neurological:  Negative for dizziness, light-headedness and headaches.   Hematological:  Negative for adenopathy. Does not bruise/bleed easily.   Psychiatric/Behavioral:  Negative for sleep disturbance. The patient is not nervous/anxious.          Past Medical History:   Diagnosis Date    Abnormal electrocardiography     resolved: 11/21/2017    Abnormal laboratory test     last assessed: 11/16/2016    Acute myocardial infarction of inferior wall (HCC) 1/20/2015    Anxiety     Community acquired pneumonia     last assessed: 10/15/2015    Complete tear of left rotator cuff     unspecified laterality    " Coronary artery disease     Depression     GERD (gastroesophageal reflux disease)     Hyperlipidemia     Hypertension     Impingement syndrome of left shoulder 3/9/2015    Inguinal mass     last assessed: 1/21/2015    Myocardial infarction (HCC)     Nontraumatic rupture of tendons of biceps (long head), left     last assessed: 1/2/2017    Right trigger finger     last assessed: 1/13/2015    Skin lesion     last assessed: 4/21/2016    Skin lesion of cheek     last assessed: 2/25/2016    Thrombocytopenia (HCC) 1/13/2015         Current Outpatient Medications:     amoxicillin (AMOXIL) 500 mg capsule, , Disp: , Rfl:     aspirin (ECOTRIN LOW STRENGTH) 81 mg EC tablet, Take 1 tablet (81 mg total) by mouth 2 (two) times a day, Disp: , Rfl:     atenolol (TENORMIN) 25 mg tablet, Take 1 tablet (25 mg total) by mouth daily, Disp: 90 tablet, Rfl: 1    Coenzyme Q10 200 MG capsule, Take 200 mg by mouth daily, Disp: , Rfl:     ezetimibe (ZETIA) 10 mg tablet, Take 0.5 tablets (5 mg total) by mouth daily, Disp: , Rfl:     famotidine (PEPCID) 40 MG tablet, TAKE 1 TABLET BY MOUTH EVERY DAY, Disp: 90 tablet, Rfl: 2    finasteride (PROSCAR) 5 mg tablet, Take 5 mg by mouth daily, Disp: , Rfl:     hydrochlorothiazide (HYDRODIURIL) 12.5 mg tablet, Take 12.5 mg by mouth daily, Disp: , Rfl:     losartan (COZAAR) 50 mg tablet, Take 50 mg by mouth daily, Disp: , Rfl:     omega-3-acid ethyl esters (LOVAZA) 1 g capsule, TAKE 2 CAPSULES (2 G TOTAL) BY MOUTH 2 (TWO) TIMES A DAY, Disp: 360 capsule, Rfl: 3    pantoprazole (PROTONIX) 40 mg tablet, Take 40 mg by mouth daily, Disp: , Rfl:     PARoxetine (PAXIL) 20 mg tablet, Take 10 mg by mouth daily, Disp: , Rfl:     simvastatin (ZOCOR) 40 mg tablet, Take 20 mg by mouth daily at bedtime Patient taking 20 mg, half of a 40 mg tablet., Disp: , Rfl:     tamsulosin (FLOMAX) 0.4 mg, Take 0.4 mg by mouth daily, Disp: , Rfl:     triamcinolone (KENALOG) 0.1 % cream, Apply topically 2 (two) times a day, Disp:  "80 g, Rfl: 2    ipratropium (ATROVENT) 0.03 % nasal spray, SPRAY 2 SPRAYS INTO EACH NOSTRIL EVERY 12 HOURS., Disp: 30 mL, Rfl: 1    Allergies   Allergen Reactions    Atorvastatin GI Intolerance       Social History   Past Surgical History:   Procedure Laterality Date    CAROTID STENT      transcath placement of carotid artery stent    COLONOSCOPY      CORONARY ANGIOPLASTY      [place] COLONOSCOPY FLX DX W/COLLJ SPEC WHEN PFRMD N/A 11/03/2017    Procedure: COLONOSCOPY;  Surgeon: JAH Henley MD;  Location: [facility];  Service: Colorectal    ROTATOR CUFF REPAIR Bilateral     ROTATOR CUFF REPAIR Bilateral     SHOULDER SURGERY       Family History   Problem Relation Age of Onset    Other Mother         brain tumor    Coronary artery disease Mother     No Known Problems Father     Heart attack Maternal Grandfather        Objective:  /76 (BP Location: Left arm, Patient Position: Sitting, Cuff Size: Standard)   Pulse 64   Temp 98.2 °F (36.8 °C) (Temporal)   Ht 5' 7.32\" (1.71 m)   Wt 96.6 kg (213 lb)   SpO2 96%   BMI 33.04 kg/m²        Physical Exam  Vitals reviewed.   Constitutional:       General: He is not in acute distress.  HENT:      Head: Normocephalic and atraumatic.      Right Ear: Tympanic membrane, ear canal and external ear normal. There is no impacted cerumen.      Left Ear: Tympanic membrane, ear canal and external ear normal. There is no impacted cerumen.      Nose: Nose normal.      Mouth/Throat:      Mouth: Mucous membranes are moist.   Eyes:      General:         Right eye: No discharge.         Left eye: No discharge.      Extraocular Movements: Extraocular movements intact.      Conjunctiva/sclera: Conjunctivae normal.      Pupils: Pupils are equal, round, and reactive to light.   Cardiovascular:      Rate and Rhythm: Normal rate and regular rhythm.   Pulmonary:      Effort: Pulmonary effort is normal. No respiratory distress.      Breath sounds: Normal breath sounds. No wheezing or rales. "   Musculoskeletal:         General: Normal range of motion.      Cervical back: Normal range of motion. No rigidity.      Right lower leg: Edema (trace b/l ankle edema) present.      Left lower leg: Edema present.   Lymphadenopathy:      Cervical: No cervical adenopathy.   Skin:     General: Skin is warm.      Findings: No rash.   Neurological:      General: No focal deficit present.      Mental Status: He is alert and oriented to person, place, and time.      Cranial Nerves: No cranial nerve deficit.      Motor: No weakness.      Gait: Gait normal.

## 2024-08-15 ENCOUNTER — TELEPHONE (OUTPATIENT)
Dept: PHYSICAL THERAPY | Facility: OTHER | Age: 79
End: 2024-08-15

## 2024-08-15 DIAGNOSIS — M48.07 LUMBOSACRAL STENOSIS WITH NEUROGENIC CLAUDICATION: Primary | ICD-10-CM

## 2024-08-15 NOTE — TELEPHONE ENCOUNTER
Call placed to the patient per [facility] referral.    V/m left for patient to call back if he has any questions. Phone number and hours of business provided.    Patient was seen by FM yesterday 08/14.    No need to triage again. A DIRECT PT SPINE REFERRAL was entered. Patient can call the [facility] directly to schedule an evaluation [phone]. Phone number and address provided.    CSP referral closed per protocol (duplicate).

## 2024-08-28 ENCOUNTER — OFFICE VISIT (OUTPATIENT)
Dept: OBGYN CLINIC | Facility: CLINIC | Age: 79
End: 2024-08-28
Payer: COMMERCIAL

## 2024-08-28 VITALS
WEIGHT: 213 LBS | BODY MASS INDEX: 33.43 KG/M2 | SYSTOLIC BLOOD PRESSURE: 127 MMHG | HEIGHT: 67 IN | DIASTOLIC BLOOD PRESSURE: 72 MMHG

## 2024-08-28 DIAGNOSIS — M20.012 MALLET FINGER OF LEFT HAND: Primary | ICD-10-CM

## 2024-08-28 PROCEDURE — 99213 OFFICE O/P EST LOW 20 MIN: CPT | Performed by: SURGERY

## 2024-08-28 NOTE — PROGRESS NOTES
Assessment    Left ring finger bony mallet  Inflamed tissue of the nailfold  DOI:  7/1/2024      Plan  No need for splinting  Start gentle ROM of his DIP joint. Working on forming composite fist  Activities as tolerated  NSAID's as needed  F/U PRN        Subjective     HPI    Patient ID:  Nader Laura is here for follow-up of the left ring bony mallet injury.  He states he is still splinting during the day and at night. He states he will cover the finger at night with a band aid but leaving it uncovered during the day  He states his finger is very stiff  He states he finished the anitbiotics as well    The following portions of the patient's history were reviewed and updated as appropriate: allergies, current medications, past family history, past medical history, past social history, past surgical history, and problem list.    Review of Systems     Objective    Imaging: No new imaging performed for today's visit.    Physical Exam     Vitals:    08/28/24 0933   BP: 127/72       General appearance:  NAD   Cardiac:  Regular rate  Lungs:  Unlabored breathing  Abdomen:  Non-distended    Orthopedic Examination:  Left ring finger    Inspection: Peeling raw skin at the nail fold with mild erythema.  With very slight flexion deformity at the DIP joint but AROM noted.  No drainage.  No marked swelling.    Palpation: Mild tenderness to palpation at the nail fold region.  No palpable fluctuance of the inflamed area.    Range-of-motion: Able to fully extend at the DIP joint however it rests with very slight extension lag.    Strength: Deferred    Sensation: Intact to light touch    Special Tests: Good cap refill at the fingertip  Palpable radial pulse

## 2024-09-19 ENCOUNTER — APPOINTMENT (OUTPATIENT)
Dept: LAB | Age: 79
End: 2024-09-19
Payer: COMMERCIAL

## 2024-09-19 DIAGNOSIS — R35.1 BENIGN PROSTATIC HYPERPLASIA WITH NOCTURIA: ICD-10-CM

## 2024-09-19 DIAGNOSIS — N40.1 BENIGN PROSTATIC HYPERPLASIA WITH NOCTURIA: ICD-10-CM

## 2024-09-19 LAB
BUN SERPL-MCNC: 14 MG/DL (ref 5–25)
CREAT SERPL-MCNC: 0.88 MG/DL (ref 0.6–1.3)
GFR SERPL CREATININE-BSD FRML MDRD: 81 ML/MIN/1.73SQ M
PSA SERPL-MCNC: 0.41 NG/ML (ref 0–4)

## 2024-09-19 PROCEDURE — 36415 COLL VENOUS BLD VENIPUNCTURE: CPT

## 2024-09-19 PROCEDURE — 82565 ASSAY OF CREATININE: CPT

## 2024-09-19 PROCEDURE — 84153 ASSAY OF PSA TOTAL: CPT

## 2024-09-19 PROCEDURE — 84520 ASSAY OF UREA NITROGEN: CPT

## 2024-10-08 ENCOUNTER — TELEPHONE (OUTPATIENT)
Age: 79
End: 2024-10-08

## 2024-10-17 ENCOUNTER — OFFICE VISIT (OUTPATIENT)
Dept: NEUROSURGERY | Facility: CLINIC | Age: 79
End: 2024-10-17
Payer: COMMERCIAL

## 2024-10-17 VITALS
SYSTOLIC BLOOD PRESSURE: 134 MMHG | RESPIRATION RATE: 17 BRPM | TEMPERATURE: 97.8 F | HEART RATE: 70 BPM | BODY MASS INDEX: 33.43 KG/M2 | WEIGHT: 213 LBS | HEIGHT: 67 IN | OXYGEN SATURATION: 95 % | DIASTOLIC BLOOD PRESSURE: 72 MMHG

## 2024-10-17 DIAGNOSIS — M25.552 LEFT HIP PAIN: ICD-10-CM

## 2024-10-17 DIAGNOSIS — M48.061 LUMBAR CANAL STENOSIS: ICD-10-CM

## 2024-10-17 DIAGNOSIS — Z96.642 STATUS POST HIP REPLACEMENT, LEFT: ICD-10-CM

## 2024-10-17 DIAGNOSIS — M46.1 BILATERAL SACROILIITIS (HCC): ICD-10-CM

## 2024-10-17 DIAGNOSIS — M54.50 BILATERAL LOW BACK PAIN: Primary | ICD-10-CM

## 2024-10-17 PROCEDURE — 99204 OFFICE O/P NEW MOD 45 MIN: CPT | Performed by: NURSE PRACTITIONER
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
## 2024-10-19 PROBLEM — M48.061 LUMBAR CANAL STENOSIS: Status: ACTIVE | Noted: 2024-10-19
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
## 2024-10-21 ENCOUNTER — APPOINTMENT (OUTPATIENT)
Dept: RADIOLOGY | Age: 79
End: 2024-10-21
Payer: COMMERCIAL

## 2024-10-21 DIAGNOSIS — M54.50 BILATERAL LOW BACK PAIN: ICD-10-CM

## 2024-10-21 DIAGNOSIS — M46.1 BILATERAL SACROILIITIS (HCC): ICD-10-CM

## 2024-10-21 DIAGNOSIS — M25.552 LEFT HIP PAIN: ICD-10-CM

## 2024-10-21 DIAGNOSIS — Z96.642 STATUS POST HIP REPLACEMENT, LEFT: ICD-10-CM

## 2024-10-21 PROCEDURE — 72114 X-RAY EXAM L-S SPINE BENDING: CPT

## 2024-10-21 PROCEDURE — 73502 X-RAY EXAM HIP UNI 2-3 VIEWS: CPT

## 2024-10-22 ENCOUNTER — EVALUATION (OUTPATIENT)
Dept: PHYSICAL THERAPY | Age: 79
End: 2024-10-22
Payer: COMMERCIAL

## 2024-10-22 DIAGNOSIS — M54.50 CHRONIC BILATERAL LOW BACK PAIN WITHOUT SCIATICA: ICD-10-CM

## 2024-10-22 DIAGNOSIS — M25.552 LEFT HIP PAIN: ICD-10-CM

## 2024-10-22 DIAGNOSIS — G89.29 CHRONIC BILATERAL LOW BACK PAIN WITHOUT SCIATICA: ICD-10-CM

## 2024-10-22 DIAGNOSIS — M46.1 BILATERAL SACROILIITIS (HCC): ICD-10-CM

## 2024-10-22 DIAGNOSIS — Z96.642 STATUS POST HIP REPLACEMENT, LEFT: ICD-10-CM

## 2024-10-22 PROCEDURE — 97110 THERAPEUTIC EXERCISES: CPT

## 2024-10-22 PROCEDURE — 97161 PT EVAL LOW COMPLEX 20 MIN: CPT

## 2024-10-22 NOTE — PROGRESS NOTES
PT Evaluation     Today's date: 10/22/2024  Patient name: Nader Laura  : 1945  MRN: [id number]  Referring provider: Jodie Breen CRNP  Dx:   Encounter Diagnosis     ICD-10-CM    1. Bilateral low back pain  M54.50 Ambulatory Referral to Physical Therapy      2. Bilateral sacroiliitis (HCC)  M46.1 Ambulatory Referral to Physical Therapy      3. Left hip pain  M25.552 Ambulatory Referral to Physical Therapy      4. Status post hip replacement, left  Z96.642 Ambulatory Referral to Physical Therapy                     Assessment  Impairments: abnormal gait, abnormal or restricted ROM, activity intolerance, impaired balance, impaired physical strength, lacks appropriate home exercise program, pain with function, weight-bearing intolerance and poor posture   Symptom irritability: low    Assessment details: Pt is a 79 y.o. male who presents to OP PT with chief c/o LBP and L hip pain. Signs and symptoms indicate probable diagnosis of lumbar radiculopathy with L lateral pelvic shift. he has primary impairments of decreased lumbar ROM, decreased lumbar P/A mobility, decreased LLE strength, decreased b/l hip and knee flexibility, abnormal gait, and abnormal balance. He is limited functionally as he has difficulty with household/community ambulation, prolonged standing, ascending/descending stairs, performing transitional movements, and difficulty sleeping. Pt responded positively to static prone, standing lumbar extension, and R lateral shifting to correct lateral shift. Provided pt with HEP handout of these exercises. Educated pt on proper completion of HEP, normal response to exercises, diagnosis, and POC. he verbalizes understanding of all education provided. All questions answered. Pt would benefit from skilled OP PT in order to improve upon impairments and return to PLOF.   Understanding of Dx/Px/POC: good     Prognosis: good    Goals  Short term goals  Pt will improve strength by 1/2 grade in order to perform  ADL's within 2-3 weeks   Pt will be able to bend down to  object off of the floor with a 50% reduction in symptoms within 2-3 weeks  Pt will be able to stand for >30 minutes with a 50% reduction in symptoms within 2-3 weeks    Long term goals  Pt will be I with advanced HEP or symptom management   Pt will be able to perform all ADL's with <3/10 pain  Pt will report 90% functional improvement  Pt will be able to tolerate all leisure activities (pushing/pulling/lifting) with <3/10 pain  Pt will be able to tolerate prolonged sitting/standing/walking with <3/10 pain  Pt will improve FOTO >/= expected         Plan  Patient would benefit from: PT eval    Planned therapy interventions: patient education, therapeutic exercise, graded exercise, functional ROM exercises, flexibility, home exercise program, manual therapy, activity modification, strengthening, stretching, joint mobilization, massage and therapeutic activities    Frequency: 2x week  Duration in weeks: 6  Treatment plan discussed with: patient        Subjective Evaluation    History of Present Illness  Mechanism of injury: LBP has been going on for quite a while. He had injections in SI joint which did not do much. Two years ago he had a hip replacement, ever since then the back pain has gotten worse. LBP is all across the back. He had some X-rays yesterday. He thinks that something wasn't quite right about the hip. He notes some shifting in his L hip where it would move up and down. Most of the hip pain is on the outside. He does have a cane that he will use from time to time that helps with pain and walking. Denies n/t in LE's. Transitioning from sitting to standing. He will turn from side to side at night when he sleeps.     LBP agg factors: walking, climbing stairs  LBP relieving factors: resting on shopping cart    L hip pain agg factors: nothing  L hip pain relieving factors: nothing           Recurrent probem    Patient Goals  Patient goals for  therapy: decreased pain, increased strength and return to sport/leisure activities    Pain  Current pain ratin  At best pain ratin  At worst pain ratin  Quality: stiffness.  Relieving factors: change in position and rest  Aggravating factors: stair climbing, walking and standing  Progression: no change    Treatments  Previous treatment: injection treatment  Current treatment: physical therapy        Objective     Postural Observations  Standing posture: fair  Correction of posture: makes symptoms better    Additional Postural Observation Details  L lateral pelvic shift in standing and while ambulating.     Active Range of Motion     Lumbar   Flexion:  Restriction level: moderate  Extension:  Restriction level: maximal  Left lateral flexion:  Restriction level: moderate  Right lateral flexion:  Restriction level: moderate  Left rotation:  Restriction level: moderate  Right rotation:  Restriction level: moderate    Additional Active Range of Motion Details  Lumbar AROM (% of normal AROM):  Flex: 75%  L lat flex: 50%  R lat flex: 50%  R rot: 50%  L rot: 50%  Ext: 25%    Joint Play     Hypomobile: L1, L2, L3, L4, L5 and S1     Pain: L5 and S1   Mechanical Assessment    Cervical      Thoracic      Lumbar    Standing extension: repeated movements  Pain location: centralized  Lying extension: sustained positions  Pain location: centralized  Pain intensity: better  Pain level: decreased  Right sidegliding: repeated movements  Pain location: centralized    Strength/Myotome Testing     Left Hip   Planes of Motion   Flexion: 3+  Extension: 3+  Abduction: 3+  Adduction: 4-    Right Hip   Planes of Motion   Flexion: 4  Extension: 4-  Abduction: 4-  Adduction: 4    Left Knee   Flexion: 4-  Extension: 4-    Right Knee   Flexion: 4  Extension: 4    Left Ankle/Foot   Dorsiflexion: 4  Plantar flexion: 3    Right Ankle/Foot   Dorsiflexion: 4  Plantar flexion: 3    Tests     Lumbar   Negative SIJ compression and sacroiliac  distraction.     Left   Positive passive SLR.   Negative crossed SLR, femoral stretch and slump test.     Right   Negative crossed SLR, femoral stretch, passive SLR and slump test.     Right Pelvic Girdle/Sacrum   Negative: thigh thrust.     Right Hip   Negative FAIZAN and FADIR.              Precautions: LBP, Hx of L THR      Manuals 10/22            Lumbar CPA                Check leg length and distraction                                     Neuro Re-Ed                                                                                                        Ther Ex             Standing lumbar ext 2x10            Standing R lateral shift 2x10            Prone press up 2x10            Prone lying 2'            Nu step warm up              Supine bride             H/S stretch             SLR flex             S/L hip abd             Prone quad stretch             Cybex leg press             Cybex hip abd             Step ups             STS w/ foam pad                          Ther Activity                                       Gait Training                                       Modalities

## 2024-10-28 ENCOUNTER — OFFICE VISIT (OUTPATIENT)
Dept: PHYSICAL THERAPY | Age: 79
End: 2024-10-28
Payer: COMMERCIAL

## 2024-10-28 DIAGNOSIS — G89.29 CHRONIC BILATERAL LOW BACK PAIN WITHOUT SCIATICA: Primary | ICD-10-CM

## 2024-10-28 DIAGNOSIS — M46.1 BILATERAL SACROILIITIS (HCC): ICD-10-CM

## 2024-10-28 DIAGNOSIS — Z96.642 STATUS POST HIP REPLACEMENT, LEFT: ICD-10-CM

## 2024-10-28 DIAGNOSIS — M54.50 CHRONIC BILATERAL LOW BACK PAIN WITHOUT SCIATICA: Primary | ICD-10-CM

## 2024-10-28 DIAGNOSIS — M25.552 LEFT HIP PAIN: ICD-10-CM

## 2024-10-28 PROCEDURE — 97110 THERAPEUTIC EXERCISES: CPT

## 2024-10-28 PROCEDURE — 97140 MANUAL THERAPY 1/> REGIONS: CPT

## 2024-10-28 NOTE — PROGRESS NOTES
"Daily Note     Today's date: 10/28/2024  Patient name: Nader Laura  : 1945  MRN: [id number]  Referring provider: Jodie Breen CRNP  Dx:   Encounter Diagnosis     ICD-10-CM    1. Chronic bilateral low back pain without sciatica  M54.50     G89.29       2. Bilateral sacroiliitis (HCC)  M46.1       3. Left hip pain  M25.552       4. Status post hip replacement, left  Z96.642                      Subjective: Pt reports that he feels fine after he does HEP but is a little sore a few hours later and the next day. Symptoms are overall the same.       Objective: See treatment diary below      Assessment: Pt with improved lumbar extension ROM in prone when performing PPU's. He does demonstrate a slight LLD with RLE being longer than LLE. He achieved a more upright posture with less lateral shift after completion of TE. Pt noted reduction in symptoms post-tx. Tolerated treatment well. Patient demonstrated fatigue post treatment, exhibited good technique with therapeutic exercises, and would benefit from continued PT      Plan: Continue per plan of care.  Progress treatment as tolerated.       Precautions: LBP, Hx of L THR      Manuals 10/22 10/28           Lumbar CPA   NJR                                                  Neuro Re-Ed                                                                                                        Ther Ex             Standing lumbar ext 2x10 2x10 w/ strap           Standing R lateral shift 2x10 2x10            Prone press up 2x10 2x10            Prone lying 2'            Nu step warm up   10' L1 s#9           Supine bride  3x10           H/S stretch  30\"x3           Hip add iso   10\"x10           Hip abd TB  3x10 black TB           SLR flex  3x10 B           S/L hip abd  NT           Prone quad stretch  NT           Cybex leg press  NT           Cybex hip abd  NT           Step ups  NT           STS w/ foam pad  NT                        Ther Activity                           "             Gait Training                                       Modalities

## 2024-10-31 ENCOUNTER — OFFICE VISIT (OUTPATIENT)
Dept: PHYSICAL THERAPY | Age: 79
End: 2024-10-31
Payer: COMMERCIAL

## 2024-10-31 DIAGNOSIS — M25.552 LEFT HIP PAIN: ICD-10-CM

## 2024-10-31 DIAGNOSIS — G89.29 CHRONIC BILATERAL LOW BACK PAIN WITHOUT SCIATICA: Primary | ICD-10-CM

## 2024-10-31 DIAGNOSIS — M46.1 BILATERAL SACROILIITIS (HCC): ICD-10-CM

## 2024-10-31 DIAGNOSIS — M54.50 CHRONIC BILATERAL LOW BACK PAIN WITHOUT SCIATICA: Primary | ICD-10-CM

## 2024-10-31 PROCEDURE — 97140 MANUAL THERAPY 1/> REGIONS: CPT

## 2024-10-31 PROCEDURE — 97110 THERAPEUTIC EXERCISES: CPT

## 2024-10-31 NOTE — PROGRESS NOTES
"Daily Note     Today's date: 10/31/2024  Patient name: Nader Laura  : 1945  MRN: [id number]  Referring provider: Jodie Breen CRNP  Dx:   Encounter Diagnosis     ICD-10-CM    1. Chronic bilateral low back pain without sciatica  M54.50     G89.29       2. Bilateral sacroiliitis (HCC)  M46.1       3. Left hip pain  M25.552                      Subjective: Pt reports stiffness in low back       Objective: See treatment diary below      Assessment: Pt continues to demonstrate L lateral pelvic shift but this is improved following completion of prone press ups and supine hip strengthening exercises. Unfortunately, pelvic shift returns in standing with weight-bearing and gravitational forces. He would benefit from continued OP PT in order to improve lumbar ROM/mobility, improve core strength, and return to PLOF. Tolerated treatment well. Patient demonstrated fatigue post treatment, exhibited good technique with therapeutic exercises, and would benefit from continued PT      Plan: Continue per plan of care.  Progress treatment as tolerated.       Precautions: LBP, Hx of L THR      Manuals 10/22 10/28 10/31          Lumbar CPA   NJR NJR                                                 Neuro Re-Ed             TA press down   3x10 16#          Paloff press   2x15 9#                                                                           Ther Ex             Standing lumbar ext 2x10 2x10 w/ strap 3x10           Standing R lateral shift 2x10 2x10  3x10          Prone press up 2x10 2x10  2x10           Prone lying 2'            Nu step warm up   10' L1 s#9 10' L1          Supine bride  3x10 3x10           H/S stretch  30\"x3 30\"x3          Hip add iso   10\"x10 10\"x10           Hip abd TB  3x10 black TB 3x10 black TB          SLR flex  3x10 B 3x10 B          S/L hip abd  NT NT          Prone quad stretch  NT NT          Cybex leg press  NT NT          Cybex hip abd  NT NT          Step ups  NT NT          STS w/ foam " pad  NT NT                       Ther Activity                                       Gait Training                                       Modalities

## 2024-11-04 ENCOUNTER — OFFICE VISIT (OUTPATIENT)
Dept: PHYSICAL THERAPY | Age: 79
End: 2024-11-04
Payer: COMMERCIAL

## 2024-11-04 DIAGNOSIS — M25.552 LEFT HIP PAIN: ICD-10-CM

## 2024-11-04 DIAGNOSIS — G89.29 CHRONIC BILATERAL LOW BACK PAIN WITHOUT SCIATICA: Primary | ICD-10-CM

## 2024-11-04 DIAGNOSIS — M46.1 BILATERAL SACROILIITIS (HCC): ICD-10-CM

## 2024-11-04 DIAGNOSIS — M54.50 CHRONIC BILATERAL LOW BACK PAIN WITHOUT SCIATICA: Primary | ICD-10-CM

## 2024-11-04 PROCEDURE — 97110 THERAPEUTIC EXERCISES: CPT

## 2024-11-04 PROCEDURE — 97140 MANUAL THERAPY 1/> REGIONS: CPT

## 2024-11-04 NOTE — PROGRESS NOTES
"Daily Note     Today's date: 2024  Patient name: Nader Laura  : 1945  MRN: [id number]  Referring provider: Jodie Breen CRNP  Dx:   Encounter Diagnosis     ICD-10-CM    1. Chronic bilateral low back pain without sciatica  M54.50     G89.29       2. Bilateral sacroiliitis (HCC)  M46.1       3. Left hip pain  M25.552                      Subjective: Pt reports that he is still stiff in the mornings for about an hour or so. Other than that he is feeling better. L hip pain is less and he does not feel like it is catching/popping      Objective: See treatment diary below      Assessment: Pt continues to respond positively to lumbar extension and lateral side-glide to improve mobility and reduce symptoms. Hypomobility t/o lumbar spine but most notably L4 to S1. This correlates with x-ray findings of lumbar spine. Incorporated cybex leg press for additional LE strengthening which pt tolerated well. Tolerated treatment well. Patient demonstrated fatigue post treatment, exhibited good technique with therapeutic exercises, and would benefit from continued PT      Plan: Continue per plan of care.  Progress treatment as tolerated.       Precautions: LBP, Hx of L THR      Manuals 10/22 10/28 10/31 11/4         Lumbar CPA   NJR NJR NJR                                                Neuro Re-Ed             TA press down   3x10 16# 3x10 16.5#         Paloff press   2x15 9# 2x15 9#                                                                          Ther Ex             Standing lumbar ext 2x10 2x10 w/ strap 3x10  3x10          Standing R lateral shift 2x10 2x10  3x10 3x10         Prone press up 2x10 2x10  2x10  2x10          Prone lying 2'            Nu step warm up   10' L1 s#9 10' L1 10' L1         Supine bride  3x10 3x10  3x10          H/S stretch  30\"x3 30\"x3 30\"x3         Hip add iso   10\"x10 10\"x10  10\"x10         Hip abd TB  3x10 black TB 3x10 black TB 3x10 black TB         SLR flex  3x10 B 3x10 B 3x10 B "         S/L hip abd  NT NT NT         Prone quad stretch  NT NT NT         Cybex leg press  NT NT 3x10 95#         Cybex hip abd  NT NT NT         Step ups  NT NT NT         STS w/ foam pad  NT NT NT                      Ther Activity                                       Gait Training                                       Modalities

## 2024-11-07 ENCOUNTER — OFFICE VISIT (OUTPATIENT)
Dept: PHYSICAL THERAPY | Age: 79
End: 2024-11-07
Payer: COMMERCIAL

## 2024-11-07 DIAGNOSIS — G89.29 CHRONIC BILATERAL LOW BACK PAIN WITHOUT SCIATICA: Primary | ICD-10-CM

## 2024-11-07 DIAGNOSIS — M54.50 CHRONIC BILATERAL LOW BACK PAIN WITHOUT SCIATICA: Primary | ICD-10-CM

## 2024-11-07 DIAGNOSIS — M46.1 BILATERAL SACROILIITIS (HCC): ICD-10-CM

## 2024-11-07 DIAGNOSIS — M25.552 LEFT HIP PAIN: ICD-10-CM

## 2024-11-07 PROCEDURE — 97110 THERAPEUTIC EXERCISES: CPT

## 2024-11-07 PROCEDURE — 97140 MANUAL THERAPY 1/> REGIONS: CPT

## 2024-11-07 NOTE — PROGRESS NOTES
"Daily Note     Today's date: 2024  Patient name: Nader Laura  : 1945  MRN: [id number]  Referring provider: Jodie Breen CRNP  Dx:   Encounter Diagnosis     ICD-10-CM    1. Chronic bilateral low back pain without sciatica  M54.50     G89.29       2. Bilateral sacroiliitis (HCC)  M46.1       3. Left hip pain  M25.552                      Subjective: Pt reports he is a little stiff and sore today. Mornings he notices the most stiffness.       Objective: See treatment diary below      Assessment: Pt responds positively to lumbar extension-based exercises. Lateral shift remains present. Improving tolerance to LE and trunk strengthening. Tolerated treatment well. Patient demonstrated fatigue post treatment, exhibited good technique with therapeutic exercises, and would benefit from continued PT      Plan: Continue per plan of care.  Progress treatment as tolerated.       Precautions: LBP, Hx of L THR      Manuals 10/22 10/28 10/31 11/4 11/7        Lumbar CPA   NJR NJR NJR NJR                                               Neuro Re-Ed             TA press down   3x10 16# 3x10 16.5# 3x10 16.5#        Paloff press   2x15 9# 2x15 9# 2x15 9#                                                                         Ther Ex             Standing lumbar ext 2x10 2x10 w/ strap 3x10  3x10  3x10        Standing R lateral shift 2x10 2x10  3x10 3x10 3x10        Prone press up 2x10 2x10  2x10  2x10  2x10        Prone lying 2'            Nu step warm up   10' L1 s#9 10' L1 10' L1 Bike 10'        Supine bride  3x10 3x10  3x10  3x10         H/S stretch  30\"x3 30\"x3 30\"x3 30\"x3        Hip add iso   10\"x10 10\"x10  10\"x10 10\"x10         Hip abd TB  3x10 black TB 3x10 black TB 3x10 black TB Cybex        SLR flex  3x10 B 3x10 B 3x10 B 3x10 B        S/L hip abd  NT NT NT         Prone quad stretch  NT NT NT 30\"x3        Cybex leg press  NT NT 3x10 95# 3x10 95#        Cybex hip abd  NT NT NT         Step ups  NT NT NT         STS " w/ foam pad  NT NT NT                      Ther Activity                                       Gait Training                                       Modalities

## 2024-11-11 ENCOUNTER — OFFICE VISIT (OUTPATIENT)
Dept: PHYSICAL THERAPY | Age: 79
End: 2024-11-11
Payer: COMMERCIAL

## 2024-11-11 DIAGNOSIS — M25.552 LEFT HIP PAIN: ICD-10-CM

## 2024-11-11 DIAGNOSIS — G89.29 CHRONIC BILATERAL LOW BACK PAIN WITHOUT SCIATICA: Primary | ICD-10-CM

## 2024-11-11 DIAGNOSIS — M46.1 BILATERAL SACROILIITIS (HCC): ICD-10-CM

## 2024-11-11 DIAGNOSIS — M54.50 CHRONIC BILATERAL LOW BACK PAIN WITHOUT SCIATICA: Primary | ICD-10-CM

## 2024-11-11 DIAGNOSIS — Z96.642 STATUS POST HIP REPLACEMENT, LEFT: ICD-10-CM

## 2024-11-11 PROCEDURE — 97110 THERAPEUTIC EXERCISES: CPT

## 2024-11-11 PROCEDURE — 97140 MANUAL THERAPY 1/> REGIONS: CPT

## 2024-11-11 NOTE — PROGRESS NOTES
"Daily Note     Today's date: 2024  Patient name: Nader Laura  : 1945  MRN: [id number]  Referring provider: Jodie Breen CRNP  Dx:   Encounter Diagnosis     ICD-10-CM    1. Chronic bilateral low back pain without sciatica  M54.50     G89.29       2. Bilateral sacroiliitis (HCC)  M46.1       3. Left hip pain  M25.552       4. Status post hip replacement, left  Z96.642                      Subjective: Pt reports improvement in symptoms but not quite \"cured\" yet.       Objective: See treatment diary below      Assessment: Pt with continued hip weakness, trunk weakness, and decreased hip flexibility. Hip flexor tightness noted with manual stretching while pt was in prone lying. Able to incorporated cybex hip abduction machine for additional LE strengthening and to improve gait/function. Tolerated treatment well. Patient demonstrated fatigue post treatment, exhibited good technique with therapeutic exercises, and would benefit from continued PT      Plan: Continue per plan of care.  Progress treatment as tolerated.       Precautions: LBP, Hx of L THR      Manuals 10/22 10/28 10/31 11/4 11/7 11/11       Lumbar CPA   NJR NJR NJR NJR NJR             Prone quad stretch                                 Neuro Re-Ed             TA press down   3x10 16# 3x10 16.5# 3x10 16.5# 3x10 16.5#       Paloff press   2x15 9# 2x15 9# 2x15 9# 2x15 9.5#                                                                        Ther Ex             Standing lumbar ext 2x10 2x10 w/ strap 3x10  3x10  3x10 3x10        Standing R lateral shift 2x10 2x10  3x10 3x10 3x10 3x10        Prone press up 2x10 2x10  2x10  2x10  2x10 3x10        Prone lying 2'            Nu step warm up   10' L1 s#9 10' L1 10' L1 Bike 10' Bike 10'        Supine bride  3x10 3x10  3x10  3x10  3x10        H/S stretch  30\"x3 30\"x3 30\"x3 30\"x3        Hip add iso   10\"x10 10\"x10  10\"x10 10\"x10  10\"x10        Hip abd TB  3x10 black TB 3x10 black TB 3x10 black TB Cybex " "Cybex       SLR flex  3x10 B 3x10 B 3x10 B 3x10 B 3x10 B       S/L hip abd  NT NT NT         Prone quad stretch  NT NT NT 30\"x3 Manual        Cybex leg press  NT NT 3x10 95# 3x10 95# 3x10 115#       Cybex hip abd  NT NT NT  3x10 45#       Step ups  NT NT NT         STS w/ foam pad  NT NT NT                      Ther Activity                                       Gait Training                                       Modalities                                                    "

## 2024-11-14 ENCOUNTER — OFFICE VISIT (OUTPATIENT)
Dept: PHYSICAL THERAPY | Age: 79
End: 2024-11-14
Payer: COMMERCIAL

## 2024-11-14 DIAGNOSIS — G89.29 CHRONIC BILATERAL LOW BACK PAIN WITHOUT SCIATICA: Primary | ICD-10-CM

## 2024-11-14 DIAGNOSIS — M25.552 LEFT HIP PAIN: ICD-10-CM

## 2024-11-14 DIAGNOSIS — M46.1 BILATERAL SACROILIITIS (HCC): ICD-10-CM

## 2024-11-14 DIAGNOSIS — M54.50 CHRONIC BILATERAL LOW BACK PAIN WITHOUT SCIATICA: Primary | ICD-10-CM

## 2024-11-14 PROCEDURE — 97110 THERAPEUTIC EXERCISES: CPT

## 2024-11-14 PROCEDURE — 97140 MANUAL THERAPY 1/> REGIONS: CPT

## 2024-11-14 NOTE — PROGRESS NOTES
"Daily Note     Today's date: 2024  Patient name: Nader Laura  : 1945  MRN: [id number]  Referring provider: Jodie Breen CRNP  Dx:   Encounter Diagnosis     ICD-10-CM    1. Chronic bilateral low back pain without sciatica  M54.50     G89.29       2. Bilateral sacroiliitis (HCC)  M46.1       3. Left hip pain  M25.552                      Subjective: Pt reports continued improvement       Objective: See treatment diary below      Assessment: Pt with improving LE strength and core strength. Mobility in lumbar spine and b/l hips continues to limit pt and affect gait and function. Pt would benefit from continued progression of mobility and strengthening exercises in order to improve activity tolerance and return to PLOF. Tolerated treatment well. Patient demonstrated fatigue post treatment, exhibited good technique with therapeutic exercises, and would benefit from continued PT      Plan: Continue per plan of care.  Progress treatment as tolerated.       Precautions: LBP, Hx of L THR      Manuals 10/22 10/28 10/31 11/4 11/7 11/11 11/14      Lumbar CPA   NJR NJR NJR NJR NJR NJR      Prone quad stretch      Prone quad stretch NJR                                Neuro Re-Ed             TA press down   3x10 16# 3x10 16.5# 3x10 16.5# 3x10 16.5# 3x10 16.5#      Paloff press   2x15 9# 2x15 9# 2x15 9# 2x15 9.5# 2x15 9.5#                                                                       Ther Ex             Standing lumbar ext 2x10 2x10 w/ strap 3x10  3x10  3x10 3x10  3x10      Standing R lateral shift 2x10 2x10  3x10 3x10 3x10 3x10  3x10      Prone press up 2x10 2x10  2x10  2x10  2x10 3x10  2x10      Prone lying 2'            Nu step warm up   10' L1 s#9 10' L1 10' L1 Bike 10' Bike 10'  10' L1      Supine bride  3x10 3x10  3x10  3x10  3x10  3x10      H/S stretch  30\"x3 30\"x3 30\"x3 30\"x3  30\"x3      Hip add iso   10\"x10 10\"x10  10\"x10 10\"x10  10\"x10        Hip abd TB  3x10 black TB 3x10 black TB 3x10 black TB " "Cybex Cybex Cybex      SLR flex  3x10 B 3x10 B 3x10 B 3x10 B 3x10 B 3x10 B      S/L hip abd  NT NT NT         Prone quad stretch  NT NT NT 30\"x3 Manual  Manual       Cybex leg press  NT NT 3x10 95# 3x10 95# 3x10 115# 3x10 115#      Cybex hip abd  NT NT NT  3x10 45# 3x10 45#      Step ups  NT NT NT         STS w/ foam pad  NT NT NT                      Ther Activity                                       Gait Training                                       Modalities                                                      "

## 2024-11-18 ENCOUNTER — OFFICE VISIT (OUTPATIENT)
Dept: PHYSICAL THERAPY | Age: 79
End: 2024-11-18
Payer: COMMERCIAL

## 2024-11-18 DIAGNOSIS — G89.29 CHRONIC BILATERAL LOW BACK PAIN WITHOUT SCIATICA: Primary | ICD-10-CM

## 2024-11-18 DIAGNOSIS — M46.1 BILATERAL SACROILIITIS (HCC): ICD-10-CM

## 2024-11-18 DIAGNOSIS — M25.552 LEFT HIP PAIN: ICD-10-CM

## 2024-11-18 DIAGNOSIS — Z96.642 STATUS POST HIP REPLACEMENT, LEFT: ICD-10-CM

## 2024-11-18 DIAGNOSIS — M54.50 CHRONIC BILATERAL LOW BACK PAIN WITHOUT SCIATICA: Primary | ICD-10-CM

## 2024-11-18 PROCEDURE — 97110 THERAPEUTIC EXERCISES: CPT

## 2024-11-18 PROCEDURE — 97140 MANUAL THERAPY 1/> REGIONS: CPT

## 2024-11-18 NOTE — PROGRESS NOTES
"Daily Note     Today's date: 2024  Patient name: Nader Laura  : 1945  MRN: [id number]  Referring provider: Jodie Breen CRNP  Dx:   Encounter Diagnosis     ICD-10-CM    1. Chronic bilateral low back pain without sciatica  M54.50     G89.29       2. Bilateral sacroiliitis (HCC)  M46.1       3. Left hip pain  M25.552       4. Status post hip replacement, left  Z96.642                      Subjective: Pt offers no new complaints      Objective: See treatment diary below      Assessment: Pt lumbar mobility is slowly improving and he continues to respond positively to lumbar extension movement preference. Hip flexibility is improving with static stretching. Progressing trunk and LE strengthening as tolerated. Tolerated treatment well. Patient demonstrated fatigue post treatment, exhibited good technique with therapeutic exercises, and would benefit from continued PT      Plan: Continue per plan of care.  Progress treatment as tolerated.       Precautions: LBP, Hx of L THR      Manuals 10/22 10/28 10/31 11/4 11/7 11/11 11/14 11/18     Lumbar CPA   NJR NJR NJR NJR NJR NJR NJR     Prone quad stretch      Prone quad stretch NJR NJR                               Neuro Re-Ed             TA press down   3x10 16# 3x10 16.5# 3x10 16.5# 3x10 16.5# 3x10 16.5# 3x10 16.5#     Paloff press   2x15 9# 2x15 9# 2x15 9# 2x15 9.5# 2x15 9.5# 2x15 10#                                                                      Ther Ex             Standing lumbar ext 2x10 2x10 w/ strap 3x10  3x10  3x10 3x10  3x10 3x10      Standing R lateral shift 2x10 2x10  3x10 3x10 3x10 3x10  3x10 3x10      Prone press up 2x10 2x10  2x10  2x10  2x10 3x10  2x10 2x10      Prone lying 2'            Nu step warm up   10' L1 s#9 10' L1 10' L1 Bike 10' Bike 10'  10' L1 10' L1     Supine bride  3x10 3x10  3x10  3x10  3x10  3x10 3x10      H/S stretch  30\"x3 30\"x3 30\"x3 30\"x3  30\"x3 30\"x3     Hip add iso   10\"x10 10\"x10  10\"x10 10\"x10  10\"x10        SLR " flex  3x10 B 3x10 B 3x10 B 3x10 B 3x10 B 3x10 B 3x10      S/L hip abd  NT NT NT    NT *    Cybex leg press  NT NT 3x10 95# 3x10 95# 3x10 115# 3x10 115# 3x10 115#     Cybex hip abd  NT NT NT  3x10 45# 3x10 45# 3x10 45#     Step ups  NT NT NT         STS w/ foam pad  NT NT NT                      Ther Activity                                       Gait Training                                       Modalities

## 2024-11-19 ENCOUNTER — TELEPHONE (OUTPATIENT)
Age: 79
End: 2024-11-19

## 2024-11-19 ENCOUNTER — PREP FOR PROCEDURE (OUTPATIENT)
Age: 79
End: 2024-11-19

## 2024-11-19 DIAGNOSIS — Z86.0100 HISTORY OF COLON POLYPS: Primary | ICD-10-CM

## 2024-11-19 NOTE — LETTER
Nader Laura  [address] [place] [address]      Location:  [address] [address]    Performing Physician: RENNY Henley MD      DATE OF PROCEDURE: December 20, 2024 TIME OF PROCEDURE:    TBD                             The OR/GI Lab will contact you the evening prior to your procedure with your exact arrival time.    We kindly ask that you immediately notify us of any need to cancel or reschedule your procedure.      WEEK BEFORE THE PROCEDURE:  Do not take Iron tablets for one week  5 days prior to the appointment AVOID vegetables and fruits with skins or seeds, nuts, corn, popcorn, and whole grain breads.  Purchase: One (1) 238-gram container of Miralax (polyethylene glycol 3350), four (4) 5 mg Dulcolax (bisacodyl) tablets, and 64-ounces of Gatorade (sports drink) - no red, orange, or purple. These may be purchased at any pharmacy without a prescription. Generic products are permissible.  Arrange responsible transportation for day of the procedure.      DAY BEFORE THE PROCEDURE:  CLEAR liquids only for entire day prior. Nothing red, orange or purple.  You MAY have:  Soda  Water  Broth Gatorade  Jell-O  Popsicles Coffee/tea without  Milk/creamer     YOU MAY NOT HAVE:  Solid foods  Milk and milk products  Juice with pulp    BOWEL PREPARATION: Includes: One (1) 238-gram container of Miralax (polyethylene glycol 3350), four (4) 5 mg Dulcolax (bisacodyl) tablets, and 64-ounces of Gatorade (sports drink). Preparation may be refrigerated. Entire bowel prep should be completed.    Afternoon before the procedure (2:00 pm - 5:00 pm):  Take two (2) 5 mg Dulcolax laxative tablets.    Evening before the procedure (6:00 pm):  Mix entire container of Miralax with 64-ounces of Gatorade and shake until all medication is dissolved.  Begin drinking solution. Drink an eight (8) ounce cup every 10-15 minutes until you have consumed half (32 ounces) of the solution. Refrigerate remaining  solution.    Night before the procedure (8:00 pm):  Take two (2) 5 mg Dulcolax laxative tablets.    Beginning 5 hours before your procedure:  Drink the remaining amount of prepared solution (32 ounces). Drink an eight (8) ounce cup every 10-15 minutes until you have consumed the remaining solution.      Bowel prep should be completed 4 hours prior to procedure time.  NOTHING TO EAT OR DRINK AFTER MIDNIGHT -EXCEPT YOUR BOWEL PREP                                                                                                                                                                                DAY OF THE PROCEDURE:  You may brush your teeth.  Leave all jewelry at home. Take out any facial piercings, if able.  Please arrive for your procedure as indicated by the OR / GI Lab / Endoscopy Unit. The hospital will contact you the day before with your exact arrival time.  Make sure you have arranged ahead of time for a responsible adult (18 or older) to accompany and drive you home after the procedure. Please discuss any transportation concerns with our staff prior to your procedure.  The effects of the anesthesia can persist for 24 hours. After receiving the sedation, you must exercise caution before engaging in any activity that could harm yourself and others (such as driving a car). Do not make any important decisions or do not drink any alcoholic beverages during this time period.  After your procedure, you may have anything you’d like to eat or drink. You will probably want to start with something light. Please include plenty of fluids. Avoid items that cause gas such as sodas and salads.      SPECIAL INSTRUCTIONS:    For patients currently taking blood thinners and/or antiplatelet therapy our office will contact the prescribing provider. Our office will contact you with any required changes to your medication regimen.  Blood thinner (i.e. - Coumadin, Pradaxa, Lovenox, Xarelto, Eliquis)    Antiplatelet (i.e. -  Plavix, Aggrenox, Effient, Brilinta)      Diabetes:  If you are Diabetic, please see separate Diabetic Instruction Sheet.  Prescribed medications:  Do not stop your aspirin, or any of your other medications (unless instructed otherwise).  Take the rest of your prescribed medications with small sips of water at least 2 hours prior to your procedure.      NOTHING TO EAT OR DRINK (INCLUDING CHEWING GUM) AFTER 12 MIDNIGHT PRIOR TO YOUR PROCEDURE EXCEPT YOUR BOWEL PREP.        For any questions or concerns related to your bowel preparation or pre-procedure instructions, please contact our office.  Thank you for choosing [facility]’s [facility]!    Revised 1/2023    [phone]

## 2024-11-21 ENCOUNTER — OFFICE VISIT (OUTPATIENT)
Dept: PHYSICAL THERAPY | Age: 79
End: 2024-11-21
Payer: COMMERCIAL

## 2024-11-21 DIAGNOSIS — M25.552 LEFT HIP PAIN: ICD-10-CM

## 2024-11-21 DIAGNOSIS — M46.1 BILATERAL SACROILIITIS (HCC): ICD-10-CM

## 2024-11-21 DIAGNOSIS — M54.50 CHRONIC BILATERAL LOW BACK PAIN WITHOUT SCIATICA: Primary | ICD-10-CM

## 2024-11-21 DIAGNOSIS — G89.29 CHRONIC BILATERAL LOW BACK PAIN WITHOUT SCIATICA: Primary | ICD-10-CM

## 2024-11-21 PROCEDURE — 97110 THERAPEUTIC EXERCISES: CPT

## 2024-11-21 PROCEDURE — 97140 MANUAL THERAPY 1/> REGIONS: CPT

## 2024-11-21 NOTE — PROGRESS NOTES
Daily Note     Today's date: 2024  Patient name: Nader Laura  : 1945  MRN: [id number]  Referring provider: Jodie Breen CRNP  Dx:   Encounter Diagnosis     ICD-10-CM    1. Chronic bilateral low back pain without sciatica  M54.50     G89.29       2. Bilateral sacroiliitis (HCC)  M46.1       3. Left hip pain  M25.552                      Subjective: Pt reports that he still will get symptoms at the R sacroiliac joint especially in the mornings. But other than that his back is fine.       Objective: See treatment diary below      Assessment: Incorporated side-lying hip abduction straight leg raise to continue to promote hip abduction strengthening. Hip extension ROM limited on RLE compared to LLE which could be contributing to symptoms. Reduced weight/resistance for paloff press due to pt's shoulder pain from hx of RTC repairs. Tolerated treatment well. Patient demonstrated fatigue post treatment, exhibited good technique with therapeutic exercises, and would benefit from continued PT      Plan: Continue per plan of care.  Progress treatment as tolerated.       Precautions: LBP, Hx of L THR      Manuals 10/22 10/28 10/31 11/4 11/7 11/11 11/14 11/18 11/21    Lumbar CPA   NJR NJR NJR NJR NJR NJR NJR NJR    Prone quad stretch      Prone quad stretch NJR NJR NJR                              Neuro Re-Ed             TA press down   3x10 16# 3x10 16.5# 3x10 16.5# 3x10 16.5# 3x10 16.5# 3x10 16.5# 3x10 16.5#    Paloff press   2x15 9# 2x15 9# 2x15 9# 2x15 9.5# 2x15 9.5# 2x15 10# 2x15 7.5#                                                                     Ther Ex             Standing lumbar ext 2x10 2x10 w/ strap 3x10  3x10  3x10 3x10  3x10 3x10  3x10     Standing R lateral shift 2x10 2x10  3x10 3x10 3x10 3x10  3x10 3x10  3x10    Prone press up 2x10 2x10  2x10  2x10  2x10 3x10  2x10 2x10  NT    Prone lying 2'            Nu step warm up   10' L1 s#9 10' L1 10' L1 Bike 10' Bike 10'  10' L1 10' L1 10' L1   "  Supine bride  3x10 3x10  3x10  3x10  3x10  3x10 3x10  3x10     H/S stretch  30\"x3 30\"x3 30\"x3 30\"x3  30\"x3 30\"x3 30\"x3    Hip add iso   10\"x10 10\"x10  10\"x10 10\"x10  10\"x10        SLR flex  3x10 B 3x10 B 3x10 B 3x10 B 3x10 B 3x10 B 3x10  3x10    S/L hip abd  NT NT NT    NT 2x10     Cybex leg press  NT NT 3x10 95# 3x10 95# 3x10 115# 3x10 115# 3x10 115# 3x10 115#    Cybex hip abd  NT NT NT  3x10 45# 3x10 45# 3x10 45# 3x10 45#    Step ups  NT NT NT         STS w/ foam pad  NT NT NT                      Ther Activity                                       Gait Training                                       Modalities                                                          "

## 2024-11-22 ENCOUNTER — CONSULT (OUTPATIENT)
Dept: PAIN MEDICINE | Facility: CLINIC | Age: 79
End: 2024-11-22
Payer: COMMERCIAL

## 2024-11-22 VITALS
SYSTOLIC BLOOD PRESSURE: 129 MMHG | HEIGHT: 67 IN | BODY MASS INDEX: 33.27 KG/M2 | WEIGHT: 212 LBS | DIASTOLIC BLOOD PRESSURE: 66 MMHG | HEART RATE: 73 BPM

## 2024-11-22 DIAGNOSIS — M48.061 SPINAL STENOSIS OF LUMBAR REGION, UNSPECIFIED WHETHER NEUROGENIC CLAUDICATION PRESENT: ICD-10-CM

## 2024-11-22 DIAGNOSIS — M54.50 BILATERAL LOW BACK PAIN: ICD-10-CM

## 2024-11-22 DIAGNOSIS — M47.816 LUMBAR SPONDYLOSIS: ICD-10-CM

## 2024-11-22 DIAGNOSIS — M46.1 BILATERAL SACROILIITIS (HCC): Primary | ICD-10-CM

## 2024-11-22 PROCEDURE — 99204 OFFICE O/P NEW MOD 45 MIN: CPT | Performed by: ANESTHESIOLOGY

## 2024-11-22 RX ORDER — MUPIROCIN 20 MG/G
OINTMENT TOPICAL
COMMUNITY
Start: 2024-10-15

## 2024-11-22 NOTE — LETTER
November 22, 2024     SATISH Puga  [address] [facility] [address]  [facility] [address]    Patient: Nader Laura   YOB: 1945   Date of Visit: 11/22/2024       Dear Dr. Breen:    Thank you for referring Nader Laura to me for evaluation. Below are my notes for this consultation.    If you have questions, please do not hesitate to call me. I look forward to following your patient along with you.         Sincerely,        Dalton Leyva DO        CC: No Recipients    Dalton Leyva DO  11/22/2024  8:55 AM  Sign when Signing Visit  Assessment  1. Bilateral sacroiliitis (HCC)    2. Bilateral low back pain    3. Lumbar spondylosis    4. Spinal stenosis of lumbar region, unspecified whether neurogenic claudication present        Plan  79-year-old male referred by SATISH Puga of neurosurgery, presenting for initial consultation regarding a 10-year history of lumbosacral back pain worse on the right than left without any radicular symptoms into his lower extremities.  He denies any recent trauma or inciting event.  MRI of the lumbar spine from April 2022 demonstrates multilevel spondylosis.  Mild to moderate central stenosis at L2-3.  Moderate central with mild to moderate bilateral foraminal stenosis at L3-4.  Moderate to severe central and left foraminal stenosis at L4-5.  Mild to moderate central and foraminal stenosis at L5-S1.  X-rays of left hip demonstrate stable arthroplasty.  Flexion and extension x-rays of the lumbar spine demonstrate multilevel spondylosis without any dynamic instability.  Patient is currently engaged in physical therapy and has found some functional improvement, but no improvement in pain.  He does find some relief with Tylenol.  Patient symptoms are most consistent with sacroiliitis.  There may be a facet mediated component contributing to his symptoms as well.  No evidence of radiculopathy or myelopathy on exam.    1.  I will schedule the patient for bilateral SI  joint injections  2.  Patient will continue with physical therapy  3.  Patient may continue with Tylenol 500 to 1000 mg every 8 hours as needed  4.  I will follow-up with the patient 2 weeks after injection      Complete risks and benefits including bleeding, infection, tissue reaction, nerve injury and allergic reaction were discussed. The approach was demonstrated using models and literature was provided. Verbal and written consent was obtained.    My impressions and treatment recommendations were discussed in detail with the patient who verbalized understanding and had no further questions.  Discharge instructions were provided. I personally saw and examined the patient and I agree with the above discussed plan of care.    No orders of the defined types were placed in this encounter.    New Medications Ordered This Visit   Medications   • mupirocin (BACTROBAN) 2 % ointment     Sig: APPLY WITH EACH DRESSING CHANGE       History of Present Illness    Nader Laura is a 79 y.o. male referred by SATISH Puga of neurosurgery, presenting for initial consultation regarding a 10-year history of lumbosacral back pain worse on the right than left without any radicular symptoms into his lower extremities.  He denies any recent trauma or inciting event.  He denies any bladder or bowel incontinence or saddle anesthesia.  MRI of the lumbar spine from April 2022 demonstrates multilevel spondylosis.  Mild to moderate central stenosis at L2-3.  Moderate central with mild to moderate bilateral foraminal stenosis at L3-4.  Moderate to severe central and left foraminal stenosis at L4-5.  Mild to moderate central and foraminal stenosis at L5-S1.  X-rays of left hip demonstrate stable arthroplasty.  Flexion and extension x-rays of the lumbar spine demonstrate multilevel spondylosis without any dynamic instability.  Patient is currently engaged in physical therapy and has found some functional improvement, but no improvement in  pain.  He does find some relief with Tylenol.  The patient rates his pain a 7 out of 10 and the pain is nearly constant.  The pain is worse in the morning.  The pain is described as dull and aching.  The pain is increased with standing, bending, and walking.  He does find some relief with heat and ice.      Other than as stated above, the patient denies any interval changes in medications, medical condition, mental condition, symptoms, or allergies since the last office visit.    I have personally reviewed and/or updated the patient's past medical history, past surgical history, family history, social history, current medications, allergies, and vital signs today.     Review of Systems   Constitutional:  Negative for fever and unexpected weight change.   HENT:  Positive for hearing loss. Negative for trouble swallowing.    Eyes:  Negative for visual disturbance.   Respiratory:  Negative for shortness of breath and wheezing.    Cardiovascular:  Positive for leg swelling. Negative for chest pain and palpitations.   Gastrointestinal:  Negative for constipation, diarrhea, nausea and vomiting.   Endocrine: Negative for cold intolerance, heat intolerance and polydipsia.   Genitourinary:  Negative for difficulty urinating and frequency.   Musculoskeletal:  Positive for arthralgias. Negative for gait problem, joint swelling and myalgias.   Skin:  Negative for rash.   Neurological:  Negative for dizziness, seizures, syncope, weakness and headaches.   Hematological:  Does not bruise/bleed easily.   Psychiatric/Behavioral:  Negative for dysphoric mood.    All other systems reviewed and are negative.      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[facility])   • Lumbar canal stenosis       Past Medical History:   Diagnosis Date   • Abnormal electrocardiography     resolved: 11/21/2017   • Abnormal laboratory test     last assessed: 11/16/2016   • Acute myocardial infarction of inferior wall ([facility]) 1/20/2015   • Anxiety    • Community acquired pneumonia     last assessed: 10/15/2015   • Complete tear of left rotator cuff     unspecified laterality   • Coronary artery disease    • Depression    • GERD (gastroesophageal reflux disease)    • Hyperlipidemia    • Hypertension    • Impingement syndrome of left shoulder 3/9/2015   • Inguinal mass     last assessed: 1/21/2015   • Myocardial infarction ([facility])    • Nontraumatic rupture of tendons of biceps (long head), left     last assessed: 1/2/2017   • Right trigger finger     last assessed: 1/13/2015   • Skin lesion     last assessed: 4/21/2016   • Skin lesion of cheek     last assessed: 2/25/2016   • Thrombocytopenia ([facility]) 1/13/2015       Past Surgical History:   Procedure Laterality Date   • CAROTID STENT      transcath placement of carotid artery stent   • COLONOSCOPY     • CORONARY ANGIOPLASTY     • PA COLONOSCOPY FLX DX W/COLLJ SPEC WHEN PFRMD N/A 11/03/2017    Procedure: COLONOSCOPY;  Surgeon: JAH Henley MD;  Location: [facility];  Service: Colorectal   • ROTATOR CUFF REPAIR Bilateral    • ROTATOR CUFF REPAIR Bilateral    • SHOULDER SURGERY         Family History   Problem Relation Age of Onset   • Other Mother         brain tumor   • Coronary artery disease Mother    • No Known Problems Father    • Heart attack Maternal Grandfather        Social History     Occupational History   • Not on file   Tobacco Use   • Smoking status: Former     Current packs/day: 0.00      Average packs/day: 1 pack/day for 20.0 years (20.0 ttl pk-yrs)     Types: Cigarettes     Start date:      Quit date:      Years since quittin.9     Passive exposure: Past   • Smokeless tobacco: Never   Vaping Use   • Vaping status: Never Used   Substance and Sexual Activity   • Alcohol use: Yes     Alcohol/week: 10.0 standard drinks of alcohol     Types: 10 Standard drinks or equivalent per week     Comment: 10-12 gin and tonic per week   • Drug use: No   • Sexual activity: Not Currently       Current Outpatient Medications on File Prior to Visit   Medication Sig   • aspirin (ECOTRIN LOW STRENGTH) 81 mg EC tablet Take 1 tablet (81 mg total) by mouth 2 (two) times a day   • atenolol (TENORMIN) 25 mg tablet Take 1 tablet (25 mg total) by mouth daily   • Coenzyme Q10 200 MG capsule Take 200 mg by mouth daily   • ezetimibe (ZETIA) 10 mg tablet Take 0.5 tablets (5 mg total) by mouth daily   • famotidine (PEPCID) 40 MG tablet TAKE 1 TABLET BY MOUTH EVERY DAY   • finasteride (PROSCAR) 5 mg tablet Take 5 mg by mouth daily   • hydrochlorothiazide (HYDRODIURIL) 12.5 mg tablet Take 12.5 mg by mouth daily   • losartan (COZAAR) 50 mg tablet Take 50 mg by mouth daily   • mupirocin (BACTROBAN) 2 % ointment APPLY WITH EACH DRESSING CHANGE   • omega-3-acid ethyl esters (LOVAZA) 1 g capsule TAKE 2 CAPSULES (2 G TOTAL) BY MOUTH 2 (TWO) TIMES A DAY   • pantoprazole (PROTONIX) 40 mg tablet Take 40 mg by mouth daily   • PARoxetine (PAXIL) 20 mg tablet Take 10 mg by mouth daily   • simvastatin (ZOCOR) 40 mg tablet Take 20 mg by mouth daily at bedtime Patient taking 20 mg, half of a 40 mg tablet.   • tamsulosin (FLOMAX) 0.4 mg Take 0.4 mg by mouth daily   • triamcinolone (KENALOG) 0.1 % cream Apply topically 2 (two) times a day   • amoxicillin (AMOXIL) 500 mg capsule  (Patient not taking: Reported on 10/17/2024)   • ipratropium (ATROVENT) 0.03 % nasal spray SPRAY 2 SPRAYS INTO EACH NOSTRIL EVERY 12 HOURS. (Patient not  "taking: Reported on 10/17/2024)     No current facility-administered medications on file prior to visit.       Allergies   Allergen Reactions   • Atorvastatin GI Intolerance       Physical Exam    /66   Pulse 73   Ht 5' 7\" (1.702 m)   Wt 96.2 kg (212 lb)   BMI 33.20 kg/m²     Constitutional: normal, well developed, well nourished, alert, in no distress and non-toxic and no overt pain behavior.  Eyes: anicteric  HEENT: grossly intact  Neck: supple, symmetric, trachea midline and no masses   Pulmonary:even and unlabored  Cardiovascular:No edema or pitting edema present  Skin:Normal without rashes or lesions and well hydrated  Psychiatric:Mood and affect appropriate  Neurologic:Cranial Nerves II-XII grossly intact  Musculoskeletal:normal gait.  Bilateral lumbar paraspinals tender to palpation from L4-S1.  Bilateral SI joints tender to palpation.  Positive Willard finger sign bilaterally.  Bilateral trochanteric flares nontender to palpation.  Bilateral patellar and Achilles reflexes were 2/4 and symmetrical.  No clonus was noted bilaterally.  Bilateral lower extremity strength was 5/5 in all muscle groups.  Sensation intact to light touch in L3 thru S1 dermatomes bilaterally.  Negative straight leg raise bilaterally.  Positive Emerson's, AP compression test, and Gaenslen's test bilaterally.    Imaging    Study Result    Narrative & Impression   XR HIP/PELV 2-3 VWS LEFT  W PELVIS IF PERFORMED     INDICATION: M54.50: Low back pain, unspecified  M46.1: Sacroiliitis, not elsewhere classified  M25.552: Pain in left hip  Z96.642: Presence of left artificial hip joint.     COMPARISON: None     FINDINGS:     Left total arthroplasty stable hardware and bony alignment without hardware complication.     Mild degenerative right hip joint.     No acute fracture or dislocation.     No lytic or blastic osseous lesion.     Unremarkable soft tissues.     Degenerative lower lumbar spine and pubic symphysis.     IMPRESSION:   "   Intact left hip replacement.     No acute osseous abnormality.           Workstation performed: [id number]          Study Result    Narrative & Impression   XR SPINE LUMBAR COMPLETE W BENDING MINIMUM 6 VIEWS     INDICATION: M54.50: Low back pain, unspecified  M46.1: Sacroiliitis, not elsewhere classified  M25.552: Pain in left hip  Z96.642: Presence of left artificial hip joint.     COMPARISON: 4/21/2022     FINDINGS:     No acute fracture. Intact pedicles.     Five non-rib-bearing lumbar vertebral bodies.     L1-L2, L2-L3 slight retrolisthesis. L4-5 slight spondylolisthesis. No significant motion instability flexion/extension. Anatomic alignment otherwise.     L4-L5, L5-S1 posterior facet arthrosis. L3-L4 through L5-S1 mild/moderate disc space narrowing. L1-L2 through L5-S1 osteophytes prominent L1-L2 through L3-L4 anterior bilaterally with attempted bridging.     Left hip replacement.     IMPRESSION:     Degenerative lumbar spine.     No acute osseous abnormality nor motion instability.           Workstation performed: [id number]     MRI LUMBAR SPINE WO CONTRAST  Order: [id number]  Impression    IMPRESSION:    Degenerative disc space disease with protrusion and hypertrophy in the posterior  elements as well as anterior/posterior epidural lipomatosis resulting in central  canal/foraminal stenosis as summarized below:    L2-3 with mild to moderate central canal narrowing, foramen patent.    L3-4 with moderate central canal stenosis and mild to moderate bilateral  foraminal narrowing greater on the right than the left.    L4-5 with moderate to severe central canal and left-sided foraminal stenosis.  Mild narrowing right foramen.    L5-S1 with mild to moderate central canal and foraminal stenosis, greater on the  left than the right.              Workstation:[id number]  Narrative    Study: MRI lumbar spine without contrast.    COMPARISON: Plain radiographs lumbar spine April 21,, 2022.    HISTORY: Back pain for  years.    Imaging sequences: Sagittal T1, T2, T2 fat sat, STIR, dual echo; axial T1, T2.    FINDINGS:    Mild levoscoliosis. Unremarkable lordotic curvature. No acute abnormal marrow  replacing signal within the visualized osseous elements or edema on STIR  acquisition. Moderate degree of chronic degenerative fatty endplate changes  throughout the lumbar spine. Conus normal in position and signal. No acute or  chronic compression fractures.    L1-2 disc narrowed with spurring and bulging anteriorly. Minor broad-based disc  protrusion posteriorly, central canal intact. Foramen patent.    L2-3 disc with spurring and bulging anteriorly. Mild broad-based disc protrusion  posteriorly and posterior epidural lipomatosis resulting in mild to moderate  central canal narrowing. Foramen patent.    L3-4 disc with diffuse broad-based disc protrusion posteriorly.  Anterior/posterior epidural lipomatosis compressing the dorsal thecal sac  resulting in moderate central canal stenosis. Paracentral disc protrusion into  the foramen with facet hypertrophy results in mild to moderate foraminal  narrowing greater on the right than the left.    L4-5 disc with mild diffuse broad-based disc protrusion. Marked  anterior/posterior epidural lipomatosis compressing the dorsal thecal sac  resulting in moderate to severe central canal narrowing. Marked paracentral disc  protrusion into the left-sided foramen and facet hypertrophy resulting in  moderate to severe foraminal stenosis. Right-sided foramen with minimal  narrowing.    L5-S1 disc narrowed. Mild broad-based protrusion posteriorly to the right of  midline. Anterior/posterior epidural lipomatosis with compression to the dorsal  thecal sac to the right of midline resulting in mild to moderate central canal  narrowing. Paracentral disc protrusion into the foramen with facet hypertrophy  as well as within the ligamentum Epps results in mild to moderate foraminal  narrowing on the left  greater than right.  Exam End: 04/27/22  3:37 PM    Specimen Collected: 04/27/22  6:50 PM Last Resulted: 04/27/22  7:01 PM   Received From: [facility]  Result Received: 08/03/23  8:02 AM

## 2024-11-22 NOTE — PROGRESS NOTES
Assessment  1. Bilateral sacroiliitis (HCC)    2. Bilateral low back pain    3. Lumbar spondylosis    4. Spinal stenosis of lumbar region, unspecified whether neurogenic claudication present        Plan  79-year-old male referred by SATISH Puga of neurosurgery, presenting for initial consultation regarding a 10-year history of lumbosacral back pain worse on the right than left without any radicular symptoms into his lower extremities.  He denies any recent trauma or inciting event.  MRI of the lumbar spine from April 2022 demonstrates multilevel spondylosis.  Mild to moderate central stenosis at L2-3.  Moderate central with mild to moderate bilateral foraminal stenosis at L3-4.  Moderate to severe central and left foraminal stenosis at L4-5.  Mild to moderate central and foraminal stenosis at L5-S1.  X-rays of left hip demonstrate stable arthroplasty.  Flexion and extension x-rays of the lumbar spine demonstrate multilevel spondylosis without any dynamic instability.  Patient is currently engaged in physical therapy and has found some functional improvement, but no improvement in pain.  He does find some relief with Tylenol.  Patient symptoms are most consistent with sacroiliitis.  There may be a facet mediated component contributing to his symptoms as well.  No evidence of radiculopathy or myelopathy on exam.    1.  I will schedule the patient for bilateral SI joint injections  2.  Patient will continue with physical therapy  3.  Patient may continue with Tylenol 500 to 1000 mg every 8 hours as needed  4.  I will follow-up with the patient 2 weeks after injection      Complete risks and benefits including bleeding, infection, tissue reaction, nerve injury and allergic reaction were discussed. The approach was demonstrated using models and literature was provided. Verbal and written consent was obtained.    My impressions and treatment recommendations were discussed in detail with the patient who verbalized  understanding and had no further questions.  Discharge instructions were provided. I personally saw and examined the patient and I agree with the above discussed plan of care.    No orders of the defined types were placed in this encounter.    New Medications Ordered This Visit   Medications    mupirocin (BACTROBAN) 2 % ointment     Sig: APPLY WITH EACH DRESSING CHANGE       History of Present Illness    Nader Laura is a 79 y.o. male referred by SATISH Puga of neurosurgery, presenting for initial consultation regarding a 10-year history of lumbosacral back pain worse on the right than left without any radicular symptoms into his lower extremities.  He denies any recent trauma or inciting event.  He denies any bladder or bowel incontinence or saddle anesthesia.  MRI of the lumbar spine from April 2022 demonstrates multilevel spondylosis.  Mild to moderate central stenosis at L2-3.  Moderate central with mild to moderate bilateral foraminal stenosis at L3-4.  Moderate to severe central and left foraminal stenosis at L4-5.  Mild to moderate central and foraminal stenosis at L5-S1.  X-rays of left hip demonstrate stable arthroplasty.  Flexion and extension x-rays of the lumbar spine demonstrate multilevel spondylosis without any dynamic instability.  Patient is currently engaged in physical therapy and has found some functional improvement, but no improvement in pain.  He does find some relief with Tylenol.  The patient rates his pain a 7 out of 10 and the pain is nearly constant.  The pain is worse in the morning.  The pain is described as dull and aching.  The pain is increased with standing, bending, and walking.  He does find some relief with heat and ice.      Other than as stated above, the patient denies any interval changes in medications, medical condition, mental condition, symptoms, or allergies since the last office visit.    I have personally reviewed and/or updated the patient's past medical  history, past surgical history, family history, social history, current medications, allergies, and vital signs today.     Review of Systems   Constitutional:  Negative for fever and unexpected weight change.   HENT:  Positive for hearing loss. Negative for trouble swallowing.    Eyes:  Negative for visual disturbance.   Respiratory:  Negative for shortness of breath and wheezing.    Cardiovascular:  Positive for leg swelling. Negative for chest pain and palpitations.   Gastrointestinal:  Negative for constipation, diarrhea, nausea and vomiting.   Endocrine: Negative for cold intolerance, heat intolerance and polydipsia.   Genitourinary:  Negative for difficulty urinating and frequency.   Musculoskeletal:  Positive for arthralgias. Negative for gait problem, joint swelling and myalgias.   Skin:  Negative for rash.   Neurological:  Negative for dizziness, seizures, syncope, weakness and headaches.   Hematological:  Does not bruise/bleed easily.   Psychiatric/Behavioral:  Negative for dysphoric mood.    All other systems reviewed and are negative.      Patient Active Problem List   Diagnosis    CAD (coronary atherosclerotic disease)    Hyperlipidemia    Hypertension    Bilateral carotid artery disease (HCC)    ARIELLE (generalized anxiety disorder)    Impaired fasting glucose    Benign colon polyp    Diverticulosis    Dupuytren's contracture    GERD without esophagitis    Benign prostatic hyperplasia with nocturia    Depressive disorder    Right rotator cuff tear    CB (obstructive sleep apnea)    Lumbosacral stenosis with neurogenic claudication    Nummular eczema    COVID    Other vascular myelopathies (HCC)    Lumbar canal stenosis       Past Medical History:   Diagnosis Date    Abnormal electrocardiography     resolved: 11/21/2017    Abnormal laboratory test     last assessed: 11/16/2016    Acute myocardial infarction of inferior wall (HCC) 1/20/2015    Anxiety     Community acquired pneumonia     last assessed:  10/15/2015    Complete tear of left rotator cuff     unspecified laterality    Coronary artery disease     Depression     GERD (gastroesophageal reflux disease)     Hyperlipidemia     Hypertension     Impingement syndrome of left shoulder 3/9/2015    Inguinal mass     last assessed: 2015    Myocardial infarction (HCC)     Nontraumatic rupture of tendons of biceps (long head), left     last assessed: 2017    Right trigger finger     last assessed: 2015    Skin lesion     last assessed: 2016    Skin lesion of cheek     last assessed: 2016    Thrombocytopenia (HCC) 2015       Past Surgical History:   Procedure Laterality Date    CAROTID STENT      transcath placement of carotid artery stent    COLONOSCOPY      CORONARY ANGIOPLASTY      SD COLONOSCOPY FLX DX W/COLLJ SPEC WHEN PFRMD N/A 2017    Procedure: COLONOSCOPY;  Surgeon: JAH Henley MD;  Location: [facility];  Service: Colorectal    ROTATOR CUFF REPAIR Bilateral     ROTATOR CUFF REPAIR Bilateral     SHOULDER SURGERY         Family History   Problem Relation Age of Onset    Other Mother         brain tumor    Coronary artery disease Mother     No Known Problems Father     Heart attack Maternal Grandfather        Social History     Occupational History    Not on file   Tobacco Use    Smoking status: Former     Current packs/day: 0.00     Average packs/day: 1 pack/day for 20.0 years (20.0 ttl pk-yrs)     Types: Cigarettes     Start date:      Quit date:      Years since quittin.9     Passive exposure: Past    Smokeless tobacco: Never   Vaping Use    Vaping status: Never Used   Substance and Sexual Activity    Alcohol use: Yes     Alcohol/week: 10.0 standard drinks of alcohol     Types: 10 Standard drinks or equivalent per week     Comment: 10-12 gin and tonic per week    Drug use: No    Sexual activity: Not Currently       Current Outpatient Medications on File Prior to Visit   Medication Sig    aspirin (ECOTRIN  "LOW STRENGTH) 81 mg EC tablet Take 1 tablet (81 mg total) by mouth 2 (two) times a day    atenolol (TENORMIN) 25 mg tablet Take 1 tablet (25 mg total) by mouth daily    Coenzyme Q10 200 MG capsule Take 200 mg by mouth daily    ezetimibe (ZETIA) 10 mg tablet Take 0.5 tablets (5 mg total) by mouth daily    famotidine (PEPCID) 40 MG tablet TAKE 1 TABLET BY MOUTH EVERY DAY    finasteride (PROSCAR) 5 mg tablet Take 5 mg by mouth daily    hydrochlorothiazide (HYDRODIURIL) 12.5 mg tablet Take 12.5 mg by mouth daily    losartan (COZAAR) 50 mg tablet Take 50 mg by mouth daily    mupirocin (BACTROBAN) 2 % ointment APPLY WITH EACH DRESSING CHANGE    omega-3-acid ethyl esters (LOVAZA) 1 g capsule TAKE 2 CAPSULES (2 G TOTAL) BY MOUTH 2 (TWO) TIMES A DAY    pantoprazole (PROTONIX) 40 mg tablet Take 40 mg by mouth daily    PARoxetine (PAXIL) 20 mg tablet Take 10 mg by mouth daily    simvastatin (ZOCOR) 40 mg tablet Take 20 mg by mouth daily at bedtime Patient taking 20 mg, half of a 40 mg tablet.    tamsulosin (FLOMAX) 0.4 mg Take 0.4 mg by mouth daily    triamcinolone (KENALOG) 0.1 % cream Apply topically 2 (two) times a day    amoxicillin (AMOXIL) 500 mg capsule  (Patient not taking: Reported on 10/17/2024)    ipratropium (ATROVENT) 0.03 % nasal spray SPRAY 2 SPRAYS INTO EACH NOSTRIL EVERY 12 HOURS. (Patient not taking: Reported on 10/17/2024)     No current facility-administered medications on file prior to visit.       Allergies   Allergen Reactions    Atorvastatin GI Intolerance       Physical Exam    /66   Pulse 73   Ht 5' 7\" (1.702 m)   Wt 96.2 kg (212 lb)   BMI 33.20 kg/m²     Constitutional: normal, well developed, well nourished, alert, in no distress and non-toxic and no overt pain behavior.  Eyes: anicteric  HEENT: grossly intact  Neck: supple, symmetric, trachea midline and no masses   Pulmonary:even and unlabored  Cardiovascular:No edema or pitting edema present  Skin:Normal without rashes or lesions and " well hydrated  Psychiatric:Mood and affect appropriate  Neurologic:Cranial Nerves II-XII grossly intact  Musculoskeletal:normal gait.  Bilateral lumbar paraspinals tender to palpation from L4-S1.  Bilateral SI joints tender to palpation.  Positive Willard finger sign bilaterally.  Bilateral trochanteric flares nontender to palpation.  Bilateral patellar and Achilles reflexes were 2/4 and symmetrical.  No clonus was noted bilaterally.  Bilateral lower extremity strength was 5/5 in all muscle groups.  Sensation intact to light touch in L3 thru S1 dermatomes bilaterally.  Negative straight leg raise bilaterally.  Positive Emerson's, AP compression test, and Gaenslen's test bilaterally.    Imaging    Study Result    Narrative & Impression   XR HIP/PELV 2-3 VWS LEFT  W PELVIS IF PERFORMED     INDICATION: M54.50: Low back pain, unspecified  M46.1: Sacroiliitis, not elsewhere classified  M25.552: Pain in left hip  Z96.642: Presence of left artificial hip joint.     COMPARISON: None     FINDINGS:     Left total arthroplasty stable hardware and bony alignment without hardware complication.     Mild degenerative right hip joint.     No acute fracture or dislocation.     No lytic or blastic osseous lesion.     Unremarkable soft tissues.     Degenerative lower lumbar spine and pubic symphysis.     IMPRESSION:     Intact left hip replacement.     No acute osseous abnormality.           Workstation performed: [id number]          Study Result    Narrative & Impression   XR SPINE LUMBAR COMPLETE W BENDING MINIMUM 6 VIEWS     INDICATION: M54.50: Low back pain, unspecified  M46.1: Sacroiliitis, not elsewhere classified  M25.552: Pain in left hip  Z96.642: Presence of left artificial hip joint.     COMPARISON: 4/21/2022     FINDINGS:     No acute fracture. Intact pedicles.     Five non-rib-bearing lumbar vertebral bodies.     L1-L2, L2-L3 slight retrolisthesis. L4-5 slight spondylolisthesis. No significant motion instability  flexion/extension. Anatomic alignment otherwise.     L4-L5, L5-S1 posterior facet arthrosis. L3-L4 through L5-S1 mild/moderate disc space narrowing. L1-L2 through L5-S1 osteophytes prominent L1-L2 through L3-L4 anterior bilaterally with attempted bridging.     Left hip replacement.     IMPRESSION:     Degenerative lumbar spine.     No acute osseous abnormality nor motion instability.           Workstation performed: [id number]     MRI LUMBAR SPINE WO CONTRAST  Order: [id number]  Impression    IMPRESSION:    Degenerative disc space disease with protrusion and hypertrophy in the posterior  elements as well as anterior/posterior epidural lipomatosis resulting in central  canal/foraminal stenosis as summarized below:    L2-3 with mild to moderate central canal narrowing, foramen patent.    L3-4 with moderate central canal stenosis and mild to moderate bilateral  foraminal narrowing greater on the right than the left.    L4-5 with moderate to severe central canal and left-sided foraminal stenosis.  Mild narrowing right foramen.    L5-S1 with mild to moderate central canal and foraminal stenosis, greater on the  left than the right.              Workstation:[id number]  Narrative    Study: MRI lumbar spine without contrast.    COMPARISON: Plain radiographs lumbar spine April 21,, 2022.    HISTORY: Back pain for years.    Imaging sequences: Sagittal T1, T2, T2 fat sat, STIR, dual echo; axial T1, T2.    FINDINGS:    Mild levoscoliosis. Unremarkable lordotic curvature. No acute abnormal marrow  replacing signal within the visualized osseous elements or edema on STIR  acquisition. Moderate degree of chronic degenerative fatty endplate changes  throughout the lumbar spine. Conus normal in position and signal. No acute or  chronic compression fractures.    L1-2 disc narrowed with spurring and bulging anteriorly. Minor broad-based disc  protrusion posteriorly, central canal intact. Foramen patent.    L2-3 disc with spurring and  bulging anteriorly. Mild broad-based disc protrusion  posteriorly and posterior epidural lipomatosis resulting in mild to moderate  central canal narrowing. Foramen patent.    L3-4 disc with diffuse broad-based disc protrusion posteriorly.  Anterior/posterior epidural lipomatosis compressing the dorsal thecal sac  resulting in moderate central canal stenosis. Paracentral disc protrusion into  the foramen with facet hypertrophy results in mild to moderate foraminal  narrowing greater on the right than the left.    L4-5 disc with mild diffuse broad-based disc protrusion. Marked  anterior/posterior epidural lipomatosis compressing the dorsal thecal sac  resulting in moderate to severe central canal narrowing. Marked paracentral disc  protrusion into the left-sided foramen and facet hypertrophy resulting in  moderate to severe foraminal stenosis. Right-sided foramen with minimal  narrowing.    L5-S1 disc narrowed. Mild broad-based protrusion posteriorly to the right of  midline. Anterior/posterior epidural lipomatosis with compression to the dorsal  thecal sac to the right of midline resulting in mild to moderate central canal  narrowing. Paracentral disc protrusion into the foramen with facet hypertrophy  as well as within the ligamentum Epps results in mild to moderate foraminal  narrowing on the left greater than right.  Exam End: 04/27/22  3:37 PM    Specimen Collected: 04/27/22  6:50 PM Last Resulted: 04/27/22  7:01 PM   Received From: [facility]  Result Received: 08/03/23  8:02 AM

## 2024-11-25 ENCOUNTER — EVALUATION (OUTPATIENT)
Dept: PHYSICAL THERAPY | Age: 79
End: 2024-11-25
Payer: COMMERCIAL

## 2024-11-25 DIAGNOSIS — M46.1 BILATERAL SACROILIITIS (HCC): ICD-10-CM

## 2024-11-25 DIAGNOSIS — M25.552 LEFT HIP PAIN: ICD-10-CM

## 2024-11-25 DIAGNOSIS — M54.50 CHRONIC BILATERAL LOW BACK PAIN WITHOUT SCIATICA: Primary | ICD-10-CM

## 2024-11-25 DIAGNOSIS — Z96.642 STATUS POST HIP REPLACEMENT, LEFT: ICD-10-CM

## 2024-11-25 DIAGNOSIS — G89.29 CHRONIC BILATERAL LOW BACK PAIN WITHOUT SCIATICA: Primary | ICD-10-CM

## 2024-11-25 PROCEDURE — 97164 PT RE-EVAL EST PLAN CARE: CPT

## 2024-11-25 PROCEDURE — 97140 MANUAL THERAPY 1/> REGIONS: CPT

## 2024-11-25 PROCEDURE — 97110 THERAPEUTIC EXERCISES: CPT

## 2024-11-25 NOTE — PROGRESS NOTES
PT Re-Evaluation and Treatment    Today's date: 2024  Patient name: Nader Laura  : 1945  MRN: [id number]  Referring provider: Jodie Breen CRNP  Dx:   Encounter Diagnosis     ICD-10-CM    1. Chronic bilateral low back pain without sciatica  M54.50     G89.29       2. Bilateral sacroiliitis (HCC)  M46.1       3. Left hip pain  M25.552       4. Status post hip replacement, left  Z96.642                        Assessment  Impairments: abnormal gait, abnormal or restricted ROM, activity intolerance, impaired balance, impaired physical strength, lacks appropriate home exercise program, pain with function, weight-bearing intolerance and poor posture   Symptom irritability: low    Assessment details: Pt is a 79 y.o. male who presents to OP PT for re-evaluation of chief c/o LBP and L hip pain. Since starting OP PT, he notes a 25% functional improvement. He notes improvement in overall mobility. Although he has made improvements, he continues to have increased pain in lumbar spine in the mornings, after prolonged standing, and after prolonged ambulation. He also has deficits in balance with initial ambulation after prolonged sitting. Signs and symptoms indicate probable diagnosis of lumbar radiculopathy with L lateral pelvic shift. He continues to exhibit primary impairments of decreased lumbar ROM, decreased lumbar P/A mobility, decreased LLE strength, decreased b/l hip and knee flexibility, abnormal gait, and abnormal balance. He is limited functionally as he has difficulty with household/community ambulation, prolonged standing, ascending/descending stairs, performing transitional movements, and difficulty sleeping. Pt has progressed with lumbar ROM exercises, trunk strengthening exercises, and LE strengthening exercises. Pt would benefit from skilled OP PT in order to improve upon impairments and return to PLOF.   Understanding of Dx/Px/POC: good     Prognosis: good    Goals  Short term goals  Pt will  improve strength by 1/2 grade in order to perform ADL's within 2-3 weeks - Partially met  Pt will be able to bend down to  object off of the floor with a 50% reduction in symptoms within 2-3 weeks - Partially met  Pt will be able to stand for >30 minutes with a 50% reduction in symptoms within 2-3 weeks - Partially met    Long term goals  Pt will be I with advanced HEP or symptom management - Improving  Pt will be able to perform all ADL's with <3/10 pain - Improving  Pt will report 90% functional improvement - Improving  Pt will be able to tolerate all leisure activities (pushing/pulling/lifting) with <3/10 pain - Improving  Pt will be able to tolerate prolonged sitting/standing/walking with <3/10 pain - Improving   Pt will improve FOTO >/= expected - Improving        Plan  Patient would benefit from: PT eval    Planned therapy interventions: patient education, therapeutic exercise, graded exercise, functional ROM exercises, flexibility, home exercise program, manual therapy, activity modification, strengthening, stretching, joint mobilization, massage, therapeutic activities, gait training, neuromuscular re-education and balance/weight bearing training    Frequency: 2x week  Duration in weeks: 6  Treatment plan discussed with: patient      Subjective Evaluation    History of Present Illness  Mechanism of injury: LBP has been going on for quite a while. He had injections in SI joint which did not do much. Two years ago he had a hip replacement, ever since then the back pain has gotten worse. LBP is all across the back. He had some X-rays yesterday. He thinks that something wasn't quite right about the hip. He notes some shifting in his L hip where it would move up and down. Most of the hip pain is on the outside. He does have a cane that he will use from time to time that helps with pain and walking. Denies n/t in LE's. Transitioning from sitting to standing. He will turn from side to side at night when he  sleeps.     LBP agg factors: walking, climbing stairs  LBP relieving factors: resting on shopping cart    L hip pain agg factors: nothing  L hip pain relieving factors: nothing     Re-evaluation (24): 25% improvement. In the morning when he gets up he still has R sided pain and is a good 8/10.Pt reports that he has loosened up a little bit. He still feels like he has terrible balance. He feels unsteady if he is sitting for a long period of time and has to get up. Pt reports that R sided back pain is now where it usually is. If he walks for too long he feels the pain across his back and then in to his L hip. He is scheduled to have injections on the . Dr. Leyva told him to continue with PT.           Recurrent probem    Patient Goals  Patient goals for therapy: decreased pain, increased strength and return to sport/leisure activities    Pain  Current pain ratin  At best pain ratin  At worst pain ratin  Quality: stiffness.  Relieving factors: change in position and rest  Aggravating factors: stair climbing, walking and standing  Progression: no change    Treatments  Previous treatment: injection treatment  Current treatment: physical therapy      Objective     Postural Observations  Standing posture: fair  Correction of posture: makes symptoms better    Additional Postural Observation Details  L lateral pelvic shift in standing and while ambulating.     Active Range of Motion     Lumbar   Flexion:  Restriction level: moderate  Extension:  Restriction level: maximal  Left lateral flexion:  Restriction level: moderate  Right lateral flexion:  Restriction level: moderate  Left rotation:  Restriction level: moderate  Right rotation:  Restriction level: moderate    Additional Active Range of Motion Details  Lumbar AROM (% of normal AROM):  Flex: 80%  L lat flex: 60%  R lat flex: 60%  R rot: 60%  L rot: 60%  Ext: 30%    Joint Play     Hypomobile: L1, L2, L3, L4, L5 and S1     Pain: L5 and S1    Mechanical Assessment    Cervical      Thoracic      Lumbar    Standing extension: repeated movements  Pain location: centralized  Lying extension: sustained positions  Pain location: centralized  Pain intensity: better  Pain level: decreased  Right sidegliding: repeated movements  Pain location: centralized    Strength/Myotome Testing     Left Hip   Planes of Motion   Flexion: 4-  Extension: 4-  Abduction: 4-  Adduction: 4-    Right Hip   Planes of Motion   Flexion: 4  Extension: 4-  Abduction: 4-  Adduction: 4    Left Knee   Flexion: 4-  Extension: 4-    Right Knee   Flexion: 4  Extension: 4    Left Ankle/Foot   Dorsiflexion: 4  Plantar flexion: 3+    Right Ankle/Foot   Dorsiflexion: 4  Plantar flexion: 3+    Tests     Lumbar   Negative SIJ compression and sacroiliac distraction.     Left   Negative crossed SLR, femoral stretch, passive SLR and slump test.     Right   Negative crossed SLR, femoral stretch, passive SLR and slump test.     Right Pelvic Girdle/Sacrum   Negative: thigh thrust.     Left Hip   Positive FAIZAN.     Right Hip   Negative FAIZAN and FADIR.              Precautions: LBP, Hx of L THR    Manuals 10/22 10/28 10/31 11/4 11/7 11/11 11/14 11/18 11/21 11/25   Lumbar CPA   NJR NJR NJR NJR NJR NJR NJR NJR NJR   Prone quad stretch      Prone quad stretch NJR NJR NJR NJR                             Neuro Re-Ed             TA press down   3x10 16# 3x10 16.5# 3x10 16.5# 3x10 16.5# 3x10 16.5# 3x10 16.5# 3x10 16.5# 3x10 16.5#   Paloff press   2x15 9# 2x15 9# 2x15 9# 2x15 9.5# 2x15 9.5# 2x15 10# 2x15 7.5# 2x15 7.5#   Tandem walking          3 laps   SL stance on           NV                                          Ther Ex             Standing lumbar ext 2x10 2x10 w/ strap 3x10  3x10  3x10 3x10  3x10 3x10  3x10  NT   Standing R lateral shift 2x10 2x10  3x10 3x10 3x10 3x10  3x10 3x10  3x10 NT   Prone press up 2x10 2x10  2x10  2x10  2x10 3x10  2x10 2x10  NT NT   Prone lying 2'            Nu step warm up   10'  "L1 s#9 10' L1 10' L1 Bike 10' Bike 10'  10' L1 10' L1 10' L1 10' L1   Supine bride  3x10 3x10  3x10  3x10  3x10  3x10 3x10  3x10  3x10   H/S stretch  30\"x3 30\"x3 30\"x3 30\"x3  30\"x3 30\"x3 30\"x3 30\"x3   Hip add iso   10\"x10 10\"x10  10\"x10 10\"x10  10\"x10        SLR flex  3x10 B 3x10 B 3x10 B 3x10 B 3x10 B 3x10 B 3x10  3x10 3x10 2#   S/L hip abd  NT NT NT    NT 2x10  NT   Cybex leg press  NT NT 3x10 95# 3x10 95# 3x10 115# 3x10 115# 3x10 115# 3x10 115# 3x10 115#   Cybex hip abd  NT NT NT  3x10 45# 3x10 45# 3x10 45# 3x10 45# 3x10 45#   Piriformis stretch          30\"x3 ea B   Step ups  NT NT NT         STS w/ foam pad  NT NT NT                      Ther Activity                                       Gait Training                                       Modalities                                                                 "

## 2024-11-27 ENCOUNTER — APPOINTMENT (OUTPATIENT)
Dept: PHYSICAL THERAPY | Age: 79
End: 2024-11-27
Payer: COMMERCIAL

## 2024-11-29 ENCOUNTER — TELEPHONE (OUTPATIENT)
Dept: INTERNAL MEDICINE CLINIC | Age: 79
End: 2024-11-29

## 2024-12-02 ENCOUNTER — OFFICE VISIT (OUTPATIENT)
Dept: PHYSICAL THERAPY | Age: 79
End: 2024-12-02
Payer: COMMERCIAL

## 2024-12-02 DIAGNOSIS — M54.50 CHRONIC BILATERAL LOW BACK PAIN WITHOUT SCIATICA: Primary | ICD-10-CM

## 2024-12-02 DIAGNOSIS — M46.1 BILATERAL SACROILIITIS (HCC): ICD-10-CM

## 2024-12-02 DIAGNOSIS — M25.552 LEFT HIP PAIN: ICD-10-CM

## 2024-12-02 DIAGNOSIS — G89.29 CHRONIC BILATERAL LOW BACK PAIN WITHOUT SCIATICA: Primary | ICD-10-CM

## 2024-12-02 DIAGNOSIS — Z96.642 STATUS POST HIP REPLACEMENT, LEFT: ICD-10-CM

## 2024-12-02 PROCEDURE — 97110 THERAPEUTIC EXERCISES: CPT

## 2024-12-02 PROCEDURE — 97140 MANUAL THERAPY 1/> REGIONS: CPT

## 2024-12-02 NOTE — PROGRESS NOTES
"Daily Note     Today's date: 2024  Patient name: Nader Laura  : 1945  MRN: [id number]  Referring provider: Jodie Breen CRNP  Dx:   Encounter Diagnosis     ICD-10-CM    1. Chronic bilateral low back pain without sciatica  M54.50     G89.29       2. Bilateral sacroiliitis (HCC)  M46.1       3. Left hip pain  M25.552       4. Status post hip replacement, left  Z96.642                      Subjective: Pt reports that he is a little stiff today but thinks it's from sitting a lot to drive out to [place] for the holiday.       Objective: See treatment diary below      Assessment: Pt continues to respond positively to lumbar extension-bias to improve posture and mobility. Challenged by static and dynamic balance activities today as he requires intermittent use of UE to avoid LOB. Hip flexibility restriction noted during supine piriformis stretching. Tolerated treatment well. Patient demonstrated fatigue post treatment, exhibited good technique with therapeutic exercises, and would benefit from continued PT      Plan: Continue per plan of care.  Progress treatment as tolerated.       Precautions: LBP, Hx of L THR    Manuals 10/22 10/28 10/31 11/4 11/7 11/11 11/14 11/18 11/21 11/25   Lumbar CPA  NJR NJR NJR NJR NJR NJR NJR NJR NJR NJR   Prone quad stretch      Prone quad stretch NJR NJR NJR NJR                             Neuro Re-Ed             TA press down 3x10 16.5#  3x10 16# 3x10 16.5# 3x10 16.5# 3x10 16.5# 3x10 16.5# 3x10 16.5# 3x10 16.5# 3x10 16.5#   Paloff press 2x15 7.5#  2x15 9# 2x15 9# 2x15 9# 2x15 9.5# 2x15 9.5# 2x15 10# 2x15 7.5# 2x15 7.5#   Tandem walking X3         3 laps   SL stance on  30\"x3 BLE         NV                                          Ther Ex             Standing lumbar ext 2x10 2x10 w/ strap 3x10  3x10  3x10 3x10  3x10 3x10  3x10  NT   Standing R lateral shift 2x10 2x10  3x10 3x10 3x10 3x10  3x10 3x10  3x10 NT   Prone press up NT 2x10  2x10  2x10  2x10 3x10  2x10 2x10  NT NT " "  Prone lying 2'            Nu step warm up  10' L3 10' L1 s#9 10' L1 10' L1 Bike 10' Bike 10'  10' L1 10' L1 10' L1 10' L1   Supine bride 3x10 3x10 3x10  3x10  3x10  3x10  3x10 3x10  3x10  3x10   H/S stretch NT 30\"x3 30\"x3 30\"x3 30\"x3  30\"x3 30\"x3 30\"x3 30\"x3   Hip add iso  NT 10\"x10 10\"x10  10\"x10 10\"x10  10\"x10        SLR flex 3x10  3x10 B 3x10 B 3x10 B 3x10 B 3x10 B 3x10 B 3x10  3x10 3x10 2#   S/L hip abd NT NT NT NT    NT 2x10  NT   Cybex leg press 3x10 115# NT NT 3x10 95# 3x10 95# 3x10 115# 3x10 115# 3x10 115# 3x10 115# 3x10 115#   Cybex hip abd 3x10 45# NT NT NT  3x10 45# 3x10 45# 3x10 45# 3x10 45# 3x10 45#   Piriformis stretch 30\"x3 ea B         30\"x3 ea B   Step ups  NT NT NT         STS w/ foam pad  NT NT NT                      Ther Activity                                       Gait Training                                       Modalities                                                                 "

## 2024-12-05 ENCOUNTER — OFFICE VISIT (OUTPATIENT)
Dept: PHYSICAL THERAPY | Age: 79
End: 2024-12-05
Payer: COMMERCIAL

## 2024-12-05 DIAGNOSIS — M46.1 BILATERAL SACROILIITIS (HCC): ICD-10-CM

## 2024-12-05 DIAGNOSIS — Z96.642 STATUS POST HIP REPLACEMENT, LEFT: ICD-10-CM

## 2024-12-05 DIAGNOSIS — M54.50 CHRONIC BILATERAL LOW BACK PAIN WITHOUT SCIATICA: Primary | ICD-10-CM

## 2024-12-05 DIAGNOSIS — G89.29 CHRONIC BILATERAL LOW BACK PAIN WITHOUT SCIATICA: Primary | ICD-10-CM

## 2024-12-05 DIAGNOSIS — M25.552 LEFT HIP PAIN: ICD-10-CM

## 2024-12-05 PROCEDURE — 97110 THERAPEUTIC EXERCISES: CPT

## 2024-12-05 PROCEDURE — 97140 MANUAL THERAPY 1/> REGIONS: CPT

## 2024-12-05 NOTE — PROGRESS NOTES
"Daily Note     Today's date: 2024  Patient name: Nader Laura  : 1945  MRN: [id number]  Referring provider: Jodie Breen CRNP  Dx:   Encounter Diagnosis     ICD-10-CM    1. Chronic bilateral low back pain without sciatica  M54.50     G89.29       2. Bilateral sacroiliitis (HCC)  M46.1       3. Left hip pain  M25.552       4. Status post hip replacement, left  Z96.642                      Subjective: Pt reports he is doing well.       Objective: See treatment diary below      Assessment: Pt continuing to respond positively to lumbar extension movement preference. Challenged by static and dynamic balance activities 2* L hip weakness. Progress LE strengthening as tolerated. Tolerated treatment well. Patient demonstrated fatigue post treatment, exhibited good technique with therapeutic exercises, and would benefit from continued PT      Plan: Continue per plan of care.  Progress treatment as tolerated.       Precautions: LBP, Hx of L THR    Manuals 12/2 12/5 10/31 11/4 11/7 11/11 11/14 11/18 11/21 11/25   Lumbar CPA  NJR NJR NJR NJR NJR NJR NJR NJR NJR NJR   Prone quad stretch      Prone quad stretch NJR NJR NJR NJR                             Neuro Re-Ed             TA press down 3x10 16.5# 3x10 16.5# 3x10 16# 3x10 16.5# 3x10 16.5# 3x10 16.5# 3x10 16.5# 3x10 16.5# 3x10 16.5# 3x10 16.5#   Paloff press 2x15 7.5# 2x15 7.5# 2x15 9# 2x15 9# 2x15 9# 2x15 9.5# 2x15 9.5# 2x15 10# 2x15 7.5# 2x15 7.5#   Tandem walking X3 x3        3 laps   SL stance on  30\"x3 BLE 30\"x3 BLE        NV                                          Ther Ex             Standing lumbar ext 2x10 2x10 3x10  3x10  3x10 3x10  3x10 3x10  3x10  NT   Standing R lateral shift 2x10 2x10  3x10 3x10 3x10 3x10  3x10 3x10  3x10 NT   Prone press up NT 2x10  2x10  2x10  2x10 3x10  2x10 2x10  NT NT   Prone lying 2'            Nu step warm up  10' L3 10' L1 s#9 10' L1 10' L1 Bike 10' Bike 10'  10' L1 10' L1 10' L1 10' L1   Supine bride 3x10 3x10 3x10  3x10 " " 3x10  3x10  3x10 3x10  3x10  3x10   H/S stretch NT NT 30\"x3 30\"x3 30\"x3  30\"x3 30\"x3 30\"x3 30\"x3   Hip add iso  NT NT 10\"x10  10\"x10 10\"x10  10\"x10        SLR flex 3x10  3x10 B 3x10 B 3x10 B 3x10 B 3x10 B 3x10 B 3x10  3x10 3x10 2#   S/L hip abd NT NT NT NT    NT 2x10  NT   Cybex leg press 3x10 115# 3x10 115# NT 3x10 95# 3x10 95# 3x10 115# 3x10 115# 3x10 115# 3x10 115# 3x10 115#   Cybex hip abd 3x10 45# 3x10 45# NT NT  3x10 45# 3x10 45# 3x10 45# 3x10 45# 3x10 45#   Piriformis stretch 30\"x3 ea B 30\"x3 ea B        30\"x3 ea B   Step ups   NT NT         STS w/ foam pad   NT NT                      Ther Activity                                       Gait Training                                       Modalities                                                                   "

## 2024-12-10 ENCOUNTER — HOSPITAL ENCOUNTER (OUTPATIENT)
Dept: RADIOLOGY | Facility: CLINIC | Age: 79
Discharge: HOME | End: 2024-12-10
Payer: COMMERCIAL

## 2024-12-10 VITALS
OXYGEN SATURATION: 95 % | SYSTOLIC BLOOD PRESSURE: 136 MMHG | RESPIRATION RATE: 18 BRPM | TEMPERATURE: 97.5 F | DIASTOLIC BLOOD PRESSURE: 75 MMHG | HEART RATE: 66 BPM

## 2024-12-10 DIAGNOSIS — M46.1 BILATERAL SACROILIITIS (HCC): ICD-10-CM

## 2024-12-10 PROCEDURE — 27096 INJECT SACROILIAC JOINT: CPT | Performed by: ANESTHESIOLOGY

## 2024-12-10 RX ORDER — ROPIVACAINE HYDROCHLORIDE 2 MG/ML
4 INJECTION, SOLUTION EPIDURAL; INFILTRATION; PERINEURAL ONCE
Status: COMPLETED | OUTPATIENT
Start: 2024-12-10 | End: 2024-12-10

## 2024-12-10 RX ORDER — METHYLPREDNISOLONE ACETATE 80 MG/ML
80 INJECTION, SUSPENSION INTRA-ARTICULAR; INTRALESIONAL; INTRAMUSCULAR; PARENTERAL; SOFT TISSUE ONCE
Status: COMPLETED | OUTPATIENT
Start: 2024-12-10 | End: 2024-12-10

## 2024-12-10 RX ORDER — LIDOCAINE HYDROCHLORIDE 10 MG/ML
5 INJECTION, SOLUTION EPIDURAL; INFILTRATION; INTRACAUDAL; PERINEURAL ONCE
Status: COMPLETED | OUTPATIENT
Start: 2024-12-10 | End: 2024-12-10

## 2024-12-10 RX ADMIN — ROPIVACAINE HYDROCHLORIDE 4 ML: 2 INJECTION EPIDURAL; INFILTRATION at 15:06

## 2024-12-10 RX ADMIN — LIDOCAINE HYDROCHLORIDE 5 ML: 10 INJECTION, SOLUTION EPIDURAL; INFILTRATION; INTRACAUDAL; PERINEURAL at 15:05

## 2024-12-10 RX ADMIN — METHYLPREDNISOLONE ACETATE 80 MG: 80 INJECTION, SUSPENSION INTRA-ARTICULAR; INTRALESIONAL; INTRAMUSCULAR; SOFT TISSUE at 15:06

## 2024-12-10 RX ADMIN — IOHEXOL 1 ML: 300 INJECTION, SOLUTION INTRAVENOUS at 15:06

## 2024-12-10 NOTE — DISCHARGE INSTRUCTIONS

## 2024-12-10 NOTE — H&P
History of Present Illness: The patient is a 79 y.o. male who presents with complaints of low back and buttock pain.    Past Medical History:   Diagnosis Date    Abnormal electrocardiography     resolved: 11/21/2017    Abnormal laboratory test     last assessed: 11/16/2016    Acute myocardial infarction of inferior wall (HCC) 1/20/2015    Anxiety     Community acquired pneumonia     last assessed: 10/15/2015    Complete tear of left rotator cuff     unspecified laterality    Coronary artery disease     Depression     GERD (gastroesophageal reflux disease)     Hyperlipidemia     Hypertension     Impingement syndrome of left shoulder 3/9/2015    Inguinal mass     last assessed: 1/21/2015    Myocardial infarction (HCC)     Nontraumatic rupture of tendons of biceps (long head), left     last assessed: 1/2/2017    Right trigger finger     last assessed: 1/13/2015    Skin lesion     last assessed: 4/21/2016    Skin lesion of cheek     last assessed: 2/25/2016    Thrombocytopenia (HCC) 1/13/2015       Past Surgical History:   Procedure Laterality Date    CAROTID STENT      transcath placement of carotid artery stent    COLONOSCOPY      CORONARY ANGIOPLASTY      [place] COLONOSCOPY FLX DX W/COLLJ SPEC WHEN PFRMD N/A 11/03/2017    Procedure: COLONOSCOPY;  Surgeon: JAH Henley MD;  Location: [facility];  Service: Colorectal    ROTATOR CUFF REPAIR Bilateral     ROTATOR CUFF REPAIR Bilateral     SHOULDER SURGERY           Current Outpatient Medications:     amoxicillin (AMOXIL) 500 mg capsule, , Disp: , Rfl:     aspirin (ECOTRIN LOW STRENGTH) 81 mg EC tablet, Take 1 tablet (81 mg total) by mouth 2 (two) times a day, Disp: , Rfl:     atenolol (TENORMIN) 25 mg tablet, Take 1 tablet (25 mg total) by mouth daily, Disp: 90 tablet, Rfl: 1    Coenzyme Q10 200 MG capsule, Take 200 mg by mouth daily, Disp: , Rfl:     ezetimibe (ZETIA) 10 mg tablet, Take 0.5 tablets (5 mg total) by mouth daily, Disp: , Rfl:     famotidine (PEPCID) 40  MG tablet, TAKE 1 TABLET BY MOUTH EVERY DAY, Disp: 90 tablet, Rfl: 2    finasteride (PROSCAR) 5 mg tablet, Take 5 mg by mouth daily, Disp: , Rfl:     hydrochlorothiazide (HYDRODIURIL) 12.5 mg tablet, Take 12.5 mg by mouth daily, Disp: , Rfl:     ipratropium (ATROVENT) 0.03 % nasal spray, SPRAY 2 SPRAYS INTO EACH NOSTRIL EVERY 12 HOURS. (Patient not taking: Reported on 10/17/2024), Disp: 30 mL, Rfl: 1    losartan (COZAAR) 50 mg tablet, Take 50 mg by mouth daily, Disp: , Rfl:     mupirocin (BACTROBAN) 2 % ointment, APPLY WITH EACH DRESSING CHANGE, Disp: , Rfl:     omega-3-acid ethyl esters (LOVAZA) 1 g capsule, TAKE 2 CAPSULES (2 G TOTAL) BY MOUTH 2 (TWO) TIMES A DAY, Disp: 360 capsule, Rfl: 3    pantoprazole (PROTONIX) 40 mg tablet, Take 40 mg by mouth daily, Disp: , Rfl:     PARoxetine (PAXIL) 20 mg tablet, Take 10 mg by mouth daily, Disp: , Rfl:     simvastatin (ZOCOR) 40 mg tablet, Take 20 mg by mouth daily at bedtime Patient taking 20 mg, half of a 40 mg tablet., Disp: , Rfl:     tamsulosin (FLOMAX) 0.4 mg, Take 0.4 mg by mouth daily, Disp: , Rfl:     triamcinolone (KENALOG) 0.1 % cream, Apply topically 2 (two) times a day, Disp: 80 g, Rfl: 2    Allergies   Allergen Reactions    Atorvastatin GI Intolerance       Physical Exam:   Vitals:    12/10/24 1443   BP: 115/70   Pulse: 71   Resp: 18   Temp: 97.5 °F (36.4 °C)   SpO2: 95%     General: Awake, Alert, Oriented x 3, Mood and affect appropriate  Respiratory: Respirations even and unlabored  Cardiovascular: Peripheral pulses intact; no edema  Musculoskeletal Exam: antalgic gait    ASA Score: 3         Assessment:   1. Bilateral sacroiliitis (HCC)        Plan: Bilateral Si joint injections

## 2024-12-13 ENCOUNTER — OFFICE VISIT (OUTPATIENT)
Dept: PHYSICAL THERAPY | Age: 79
End: 2024-12-13
Payer: COMMERCIAL

## 2024-12-13 DIAGNOSIS — M46.1 BILATERAL SACROILIITIS (HCC): ICD-10-CM

## 2024-12-13 DIAGNOSIS — M25.552 LEFT HIP PAIN: ICD-10-CM

## 2024-12-13 DIAGNOSIS — G89.29 CHRONIC BILATERAL LOW BACK PAIN WITHOUT SCIATICA: Primary | ICD-10-CM

## 2024-12-13 DIAGNOSIS — M54.50 CHRONIC BILATERAL LOW BACK PAIN WITHOUT SCIATICA: Primary | ICD-10-CM

## 2024-12-13 PROCEDURE — 97110 THERAPEUTIC EXERCISES: CPT

## 2024-12-13 PROCEDURE — 97140 MANUAL THERAPY 1/> REGIONS: CPT

## 2024-12-13 NOTE — PROGRESS NOTES
"Daily Note     Today's date: 2024  Patient name: Nader Laura  : 1945  MRN: [id number]  Referring provider: Jodie Breen CRNP  Dx:   Encounter Diagnosis     ICD-10-CM    1. Chronic bilateral low back pain without sciatica  M54.50     G89.29       2. Bilateral sacroiliitis (HCC)  M46.1       3. Left hip pain  M25.552                      Subjective: Pt reports that he received injection this week and his back feels great. Takes less time in the morning to loosen up and feels like he could walk longer distances.       Objective: See treatment diary below      Assessment: Pt with improved overall hip and lumbar ROM/mobility today. Progressing strength as tolerated. Tolerated treatment well. Patient demonstrated fatigue post treatment, exhibited good technique with therapeutic exercises, and would benefit from continued PT      Plan: Continue per plan of care.  Progress treatment as tolerated.       Precautions: LBP, Hx of L THR    Manuals    Lumbar CPA  NJR NJR NJR NJR NJR NJR NJR NJR NJR NJR   Prone quad stretch      Prone quad stretch NJR NJR NJR NJR                             Neuro Re-Ed             TA press down 3x10 16.5# 3x10 16.5# 3x10 16# 3x10 16.5# 3x10 16.5# 3x10 16.5# 3x10 16.5# 3x10 16.5# 3x10 16.5# 3x10 16.5#   Paloff press 2x15 7.5# 2x15 7.5# 2x15 9# 2x15 9# 2x15 9# 2x15 9.5# 2x15 9.5# 2x15 10# 2x15 7.5# 2x15 7.5#   Tandem walking X3 x3 NT       3 laps   SL stance on  30\"x3 BLE 30\"x3 BLE 30\"x3 BLE       NV                                          Ther Ex             Standing lumbar ext 2x10 2x10 3x10  3x10  3x10 3x10  3x10 3x10  3x10  NT   Standing R lateral shift 2x10 2x10  3x10 3x10 3x10 3x10  3x10 3x10  3x10 NT   Prone press up NT  2x10  2x10  2x10 3x10  2x10 2x10  NT NT   Prone lying 2'            Nu step warm up  10' L3 10' 10' L1 10' L1 Bike 10' Bike 10'  10' L1 10' L1 10' L1 10' L1   Supine bride 3x10 3x10 3x10  3x10  3x10  " "3x10  3x10 3x10  3x10  3x10   H/S stretch NT NT  30\"x3 30\"x3  30\"x3 30\"x3 30\"x3 30\"x3   Hip add iso  NT NT 10\"x10  10\"x10 10\"x10  10\"x10        SLR flex 3x10  3x10 B 3x10 B 3x10 B 3x10 B 3x10 B 3x10 B 3x10  3x10 3x10 2#   S/L hip abd NT NT NT NT    NT 2x10  NT   Cybex leg press 3x10 115# 3x10 115# 3x10 115# 3x10 95# 3x10 95# 3x10 115# 3x10 115# 3x10 115# 3x10 115# 3x10 115#   Cybex hip abd 3x10 45# 3x10 45# 3x10 45# NT  3x10 45# 3x10 45# 3x10 45# 3x10 45# 3x10 45#   Piriformis stretch 30\"x3 ea B 30\"x3 ea B 30\"x3 ea B       30\"x3 ea B   Step ups   NT NT         STS w/ foam pad   NT NT                      Ther Activity                                       Gait Training                                       Modalities                                                                     "

## 2024-12-18 ENCOUNTER — OFFICE VISIT (OUTPATIENT)
Dept: PHYSICAL THERAPY | Age: 79
End: 2024-12-18
Payer: COMMERCIAL

## 2024-12-18 DIAGNOSIS — G89.29 CHRONIC BILATERAL LOW BACK PAIN WITHOUT SCIATICA: Primary | ICD-10-CM

## 2024-12-18 DIAGNOSIS — M46.1 BILATERAL SACROILIITIS (HCC): ICD-10-CM

## 2024-12-18 DIAGNOSIS — M54.50 CHRONIC BILATERAL LOW BACK PAIN WITHOUT SCIATICA: Primary | ICD-10-CM

## 2024-12-18 PROCEDURE — 97110 THERAPEUTIC EXERCISES: CPT

## 2024-12-18 PROCEDURE — 97140 MANUAL THERAPY 1/> REGIONS: CPT

## 2024-12-18 NOTE — PROGRESS NOTES
"Daily Note     Today's date: 2024  Patient name: Nader Laura  : 1945  MRN: [id number]  Referring provider: Jodie Breen CRNP  Dx:   Encounter Diagnosis     ICD-10-CM    1. Chronic bilateral low back pain without sciatica  M54.50     G89.29       2. Bilateral sacroiliitis (HCC)  M46.1                      Subjective: Pt reports some tightness/soreness in his L thigh but overall doing well.       Objective: See treatment diary below      Assessment: Progressing core and LE strength as tolerated. Pt maintaining ROM/mobility of lumbar spine and hips but continues to be limited when compared to normal limits. Tolerated treatment well. Patient demonstrated fatigue post treatment, exhibited good technique with therapeutic exercises, and would benefit from continued PT      Plan: Continue per plan of care.  Progress treatment as tolerated.       Precautions: LBP, Hx of L THR    Manuals          Lumbar CPA  NJR NJR NJR NJR         Prone quad stretch    NJR                                   Neuro Re-Ed             TA press down 3x10 16.5# 3x10 16.5# 3x10 16# 3x10 17#         Paloff press 2x15 7.5# 2x15 7.5# 2x15 9# 2x15 7.5#         Tandem walking X3 x3 NT X3 laps          SL stance on  30\"x3 BLE 30\"x3 BLE 30\"x3 BLE 30\"x3 BLE                                                Ther Ex             Standing lumbar ext 2x10 2x10 3x10  3x10          Standing R lateral shift 2x10 2x10  3x10 3x10         Prone press up NT            Prone lying 2'            Nu step warm up  10' L3 10' 10' L1 10' L3         Supine bride 3x10 3x10 3x10  3x10          H/S stretch NT NT  NT         Hip add iso  NT NT 10\"x10  NT         SLR flex 3x10  3x10 B 3x10 B 3x10 B         S/L hip abd NT NT NT          Cybex leg press 3x10 115# 3x10 115# 3x10 115# 3x10 120#         Cybex hip abd 3x10 45# 3x10 45# 3x10 45# 3x10 50#         Piriformis stretch 30\"x3 ea B 30\"x3 ea B 30\"x3 ea B 30\"x3 ea B         Step ups   NT       "    STS w/ foam pad   NT                       Ther Activity                                       Gait Training                                       Modalities

## 2024-12-23 ENCOUNTER — TELEPHONE (OUTPATIENT)
Dept: PAIN MEDICINE | Facility: MEDICAL CENTER | Age: 79
End: 2024-12-23

## 2024-12-30 ENCOUNTER — HOSPITAL ENCOUNTER (OUTPATIENT)
Dept: NON INVASIVE DIAGNOSTICS | Facility: CLINIC | Age: 79
Discharge: HOME | End: 2024-12-30
Payer: COMMERCIAL

## 2024-12-30 DIAGNOSIS — I10 HYPERTENSION, BENIGN: ICD-10-CM

## 2024-12-30 PROCEDURE — 93978 VASCULAR STUDY: CPT | Performed by: SURGERY

## 2024-12-30 PROCEDURE — 93922 UPR/L XTREMITY ART 2 LEVELS: CPT

## 2024-12-30 PROCEDURE — 93880 EXTRACRANIAL BILAT STUDY: CPT | Performed by: SURGERY

## 2024-12-30 PROCEDURE — 93922 UPR/L XTREMITY ART 2 LEVELS: CPT | Performed by: SURGERY

## 2024-12-31 ENCOUNTER — RESULTS FOLLOW-UP (OUTPATIENT)
Dept: INTERNAL MEDICINE CLINIC | Age: 79
End: 2024-12-31

## 2025-01-15 DIAGNOSIS — I65.29 ASYMPTOMATIC CAROTID ARTERY STENOSIS, UNSPECIFIED LATERALITY: Primary | ICD-10-CM

## 2025-01-29 ENCOUNTER — HOSPITAL ENCOUNTER (OUTPATIENT)
Dept: GASTROENTEROLOGY | Facility: HOSPITAL | Age: 80
Setting detail: OUTPATIENT SURGERY
Discharge: HOME | End: 2025-01-29
Attending: COLON & RECTAL SURGERY
Payer: MEDICARE

## 2025-01-29 ENCOUNTER — ANESTHESIA (OUTPATIENT)
Dept: GASTROENTEROLOGY | Facility: HOSPITAL | Age: 80
End: 2025-01-29
Payer: COMMERCIAL

## 2025-01-29 ENCOUNTER — ANESTHESIA EVENT (OUTPATIENT)
Dept: GASTROENTEROLOGY | Facility: HOSPITAL | Age: 80
End: 2025-01-29
Payer: COMMERCIAL

## 2025-01-29 VITALS
TEMPERATURE: 97.5 F | BODY MASS INDEX: 30.8 KG/M2 | HEIGHT: 68 IN | RESPIRATION RATE: 18 BRPM | WEIGHT: 203.2 LBS | DIASTOLIC BLOOD PRESSURE: 64 MMHG | OXYGEN SATURATION: 94 % | HEART RATE: 73 BPM | SYSTOLIC BLOOD PRESSURE: 139 MMHG

## 2025-01-29 DIAGNOSIS — Z86.0100 HISTORY OF COLON POLYPS: ICD-10-CM

## 2025-01-29 PROCEDURE — G0105 COLORECTAL SCRN; HI RISK IND: HCPCS | Performed by: COLON & RECTAL SURGERY

## 2025-01-29 RX ORDER — SODIUM CHLORIDE, SODIUM LACTATE, POTASSIUM CHLORIDE, CALCIUM CHLORIDE 600; 310; 30; 20 MG/100ML; MG/100ML; MG/100ML; MG/100ML
INJECTION, SOLUTION INTRAVENOUS CONTINUOUS PRN
Status: DISCONTINUED | OUTPATIENT
Start: 2025-01-29 | End: 2025-01-29

## 2025-01-29 RX ORDER — EPHEDRINE SULFATE 50 MG/ML
INJECTION INTRAVENOUS AS NEEDED
Status: DISCONTINUED | OUTPATIENT
Start: 2025-01-29 | End: 2025-01-29

## 2025-01-29 RX ORDER — PROPOFOL 10 MG/ML
INJECTION, EMULSION INTRAVENOUS AS NEEDED
Status: DISCONTINUED | OUTPATIENT
Start: 2025-01-29 | End: 2025-01-29

## 2025-01-29 RX ORDER — PROPOFOL 10 MG/ML
INJECTION, EMULSION INTRAVENOUS CONTINUOUS PRN
Status: DISCONTINUED | OUTPATIENT
Start: 2025-01-29 | End: 2025-01-29

## 2025-01-29 RX ORDER — PHENYLEPHRINE HCL IN 0.9% NACL 1 MG/10 ML
SYRINGE (ML) INTRAVENOUS AS NEEDED
Status: DISCONTINUED | OUTPATIENT
Start: 2025-01-29 | End: 2025-01-29

## 2025-01-29 RX ADMIN — PROPOFOL 25 MG: 10 INJECTION, EMULSION INTRAVENOUS at 07:52

## 2025-01-29 RX ADMIN — PROPOFOL 25 MG: 10 INJECTION, EMULSION INTRAVENOUS at 07:37

## 2025-01-29 RX ADMIN — Medication 100 MCG: at 07:58

## 2025-01-29 RX ADMIN — EPHEDRINE SULFATE 5 MG: 50 INJECTION, SOLUTION INTRAVENOUS at 07:48

## 2025-01-29 RX ADMIN — EPHEDRINE SULFATE 10 MG: 50 INJECTION, SOLUTION INTRAVENOUS at 07:40

## 2025-01-29 RX ADMIN — Medication 100 MCG: at 07:39

## 2025-01-29 RX ADMIN — Medication 200 MCG: at 07:47

## 2025-01-29 RX ADMIN — PROPOFOL 25 MG: 10 INJECTION, EMULSION INTRAVENOUS at 07:36

## 2025-01-29 RX ADMIN — PROPOFOL 50 MG: 10 INJECTION, EMULSION INTRAVENOUS at 07:35

## 2025-01-29 RX ADMIN — PROPOFOL 100 MCG/KG/MIN: 10 INJECTION, EMULSION INTRAVENOUS at 07:38

## 2025-01-29 RX ADMIN — PROPOFOL 25 MG: 10 INJECTION, EMULSION INTRAVENOUS at 07:55

## 2025-01-29 RX ADMIN — Medication 100 MCG: at 07:48

## 2025-01-29 RX ADMIN — PROPOFOL 25 MG: 10 INJECTION, EMULSION INTRAVENOUS at 07:53

## 2025-01-29 RX ADMIN — Medication 100 MCG: at 07:44

## 2025-01-29 RX ADMIN — Medication 100 MCG: at 07:38

## 2025-01-29 RX ADMIN — PROPOFOL 25 MG: 10 INJECTION, EMULSION INTRAVENOUS at 07:50

## 2025-01-29 RX ADMIN — SODIUM CHLORIDE, SODIUM LACTATE, POTASSIUM CHLORIDE, AND CALCIUM CHLORIDE: .6; .31; .03; .02 INJECTION, SOLUTION INTRAVENOUS at 07:32

## 2025-01-29 RX ADMIN — PROPOFOL 25 MG: 10 INJECTION, EMULSION INTRAVENOUS at 07:58

## 2025-01-29 NOTE — H&P
History and Physical   Colon and Rectal Surgery   Nader Laura 79 y.o. male MRN: [id number]  Unit/Bed#:  Encounter: [id number]  01/29/25   7:23 AM      CC:  History of colon polyps    History of Present Illness   HPI:  Nader Laura is a 79 y.o. male with no GI symptoms.  Historical Information   Past Medical History:   Diagnosis Date    Abnormal electrocardiography     resolved: 11/21/2017    Abnormal laboratory test     last assessed: 11/16/2016    Acute myocardial infarction of inferior wall (HCC) 1/20/2015    Anxiety     Community acquired pneumonia     last assessed: 10/15/2015    Complete tear of left rotator cuff     unspecified laterality    Coronary artery disease     Depression     GERD (gastroesophageal reflux disease)     Hyperlipidemia     Hypertension     Impingement syndrome of left shoulder 3/9/2015    Inguinal mass     last assessed: 1/21/2015    Myocardial infarction (HCC)     Nontraumatic rupture of tendons of biceps (long head), left     last assessed: 1/2/2017    Right trigger finger     last assessed: 1/13/2015    Skin lesion     last assessed: 4/21/2016    Skin lesion of cheek     last assessed: 2/25/2016    Thrombocytopenia (HCC) 1/13/2015     Past Surgical History:   Procedure Laterality Date    CAROTID STENT      transcath placement of carotid artery stent    COLONOSCOPY      CORONARY ANGIOPLASTY      [place] COLONOSCOPY FLX DX W/COLLJ SPEC WHEN PFRMD N/A 11/03/2017    Procedure: COLONOSCOPY;  Surgeon: JAH Henley MD;  Location: [facility];  Service: Colorectal    ROTATOR CUFF REPAIR Bilateral     ROTATOR CUFF REPAIR Bilateral     SHOULDER SURGERY      TOTAL HIP ARTHROPLASTY Left        Meds/Allergies     Not in a hospital admission.      Current Outpatient Medications:     aspirin (ECOTRIN LOW STRENGTH) 81 mg EC tablet, Take 1 tablet (81 mg total) by mouth 2 (two) times a day, Disp: , Rfl:     atenolol (TENORMIN) 25 mg tablet, Take 1 tablet (25 mg total) by mouth daily, Disp: 90  tablet, Rfl: 1    Coenzyme Q10 200 MG capsule, Take 200 mg by mouth daily, Disp: , Rfl:     famotidine (PEPCID) 40 MG tablet, TAKE 1 TABLET BY MOUTH EVERY DAY, Disp: 90 tablet, Rfl: 2    hydrochlorothiazide (HYDRODIURIL) 12.5 mg tablet, Take 12.5 mg by mouth daily, Disp: , Rfl:     losartan (COZAAR) 50 mg tablet, Take 50 mg by mouth daily, Disp: , Rfl:     omega-3-acid ethyl esters (LOVAZA) 1 g capsule, TAKE 2 CAPSULES (2 G TOTAL) BY MOUTH 2 (TWO) TIMES A DAY, Disp: 360 capsule, Rfl: 3    pantoprazole (PROTONIX) 40 mg tablet, Take 40 mg by mouth daily, Disp: , Rfl:     PARoxetine (PAXIL) 20 mg tablet, Take 10 mg by mouth daily, Disp: , Rfl:     simvastatin (ZOCOR) 40 mg tablet, Take 20 mg by mouth daily at bedtime Patient taking 20 mg, half of a 40 mg tablet., Disp: , Rfl:     tamsulosin (FLOMAX) 0.4 mg, Take 0.4 mg by mouth daily, Disp: , Rfl:     ezetimibe (ZETIA) 10 mg tablet, Take 0.5 tablets (5 mg total) by mouth daily, Disp: , Rfl:     ipratropium (ATROVENT) 0.03 % nasal spray, SPRAY 2 SPRAYS INTO EACH NOSTRIL EVERY 12 HOURS. (Patient taking differently: 2 sprays into each nostril if needed for rhinitis), Disp: 30 mL, Rfl: 1    mupirocin (BACTROBAN) 2 % ointment, APPLY WITH EACH DRESSING CHANGE, Disp: , Rfl:     triamcinolone (KENALOG) 0.1 % cream, Apply topically 2 (two) times a day, Disp: 80 g, Rfl: 2    Allergies   Allergen Reactions    Atorvastatin GI Intolerance         Social History   Social History     Substance and Sexual Activity   Alcohol Use Yes    Alcohol/week: 10.0 standard drinks of alcohol    Types: 10 Standard drinks or equivalent per week    Comment: 10-12 gin and tonic per week     Social History     Substance and Sexual Activity   Drug Use No     Social History     Tobacco Use   Smoking Status Former    Current packs/day: 0.00    Average packs/day: 1 pack/day for 20.0 years (20.0 ttl pk-yrs)    Types: Cigarettes    Start date:     Quit date:     Years since quittin.1    Passive  "exposure: Past   Smokeless Tobacco Never         Family History:   Family History   Problem Relation Age of Onset    Other Mother         brain tumor    Coronary artery disease Mother     No Known Problems Father     Heart attack Maternal Grandfather          Objective     Current Vitals:   Blood Pressure: 119/61 (01/29/25 0713)  Pulse: 76 (01/29/25 0713)  Temperature: 97.5 °F (36.4 °C) (01/29/25 0713)  Temp Source: Temporal (01/29/25 0713)  Respirations: 16 (01/29/25 0713)  Height: 5' 8\" (172.7 cm) (01/29/25 0713)  Weight - Scale: 92.2 kg (203 lb 3.2 oz) (01/29/25 0713)  SpO2: 96 % (01/29/25 0713)  No intake or output data in the 24 hours ending 01/29/25 0723    Physical Exam:  General:  Well nourished, no distress.  Neuro: Alert and oriented  Eyes:Sclera anicteric, conjunctiva pink.  Pulm: Clear to auscultation bilaterally. No respiratory Distress.   CV:  Regular rate and rhythm. No murmurs.  Abdomen:  Soft, flat, non-tender, without masses or hepatosplenomegaly.    Lab Results:       ASSESSMENT:  Nader Laura is a 79 y.o. male for surveillance.  PLAN:  Colonoscopy.  Risks , including, but not limited to, bleeding, perforation, missed lesions, and potential need for surgery, were reviewed. Alternatives to colonoscopy were discussed.  RENNY Henley MD  "

## 2025-01-29 NOTE — ANESTHESIA POSTPROCEDURE EVALUATION
Post-Op Assessment Note    CV Status:  Stable  Pain Score: 0    Pain management: adequate       Mental Status:  Alert and awake   Hydration Status:  Euvolemic   PONV Controlled:  Controlled   Airway Patency:  Patent     Post Op Vitals Reviewed: Yes    No anethesia notable event occurred.    Staff: CRNA           Last Filed PACU Vitals:  Vitals Value Taken Time   Temp 97    Pulse 73    /71 01/29/25 0813   Resp 16    SpO2 95

## 2025-01-29 NOTE — ANESTHESIA PREPROCEDURE EVALUATION
Procedure:  COLONOSCOPY    Relevant Problems   ANESTHESIA (within normal limits)      CARDIO   (+) Bilateral carotid artery disease (HCC)   (+) CAD (coronary atherosclerotic disease)   (+) Hyperlipidemia   (+) Hypertension      GI/HEPATIC   (+) GERD without esophagitis      MUSCULOSKELETAL   (+) Bilateral low back pain   (+) Lumbar spondylosis      NEURO/PSYCH   (+) Depressive disorder   (+) ARIELLE (generalized anxiety disorder)      PULMONARY   (+) CB (obstructive sleep apnea)      Neurology/Sleep   (+) Other vascular myelopathies (HCC)      Orthopedic/Musculoskeletal   (+) Lumbosacral stenosis with neurogenic claudication      FEN/Gastrointestinal   (+) Benign colon polyp      Other   (+) Benign prostatic hyperplasia with nocturia     Sinus bradycardia with 1st degree A-V block  Otherwise normal ECG       Interpretation Summary         Stress ECG: No ST deviation is noted. The stress ECG is negative for ischemia after vasodilation and low level exercise, without reproduction of symptoms.    Perfusion: There is a left ventricular perfusion defect that is medium in size with moderate reduction in uptake present in the entire inferior location(s) that is partially reversible.    Stress Function: Left ventricular function post-stress is normal. Post-stress ejection fraction is 70 %.    Stress Combined Conclusion: Left ventricular perfusion is probably abnormal. There is probable ischemia in the inferior wall.  Diaphragmatic attenuation artefact may be contributing to this pattern.     Physical Exam    Airway    Mallampati score: II  TM Distance: >3 FB  Neck ROM: full     Dental   No notable dental hx     Cardiovascular  Rhythm: regular, Rate: normal, Cardiovascular exam normal    Pulmonary  Pulmonary exam normal Breath sounds clear to auscultation    Other Findings        Anesthesia Plan  ASA Score- 3     Anesthesia Type- IV sedation with anesthesia with ASA Monitors.         Additional Monitors:     Airway Plan:             Plan Factors-Exercise tolerance (METS): <4 METS.Exercise comment: Exercise limited by back and hip pain. Denies orthopnea/PND.    Chart reviewed. EKG reviewed.  Existing labs reviewed. Patient summary reviewed.    Patient is not a current smoker.              Induction-     Postoperative Plan-         Informed Consent- Anesthetic plan and risks discussed with patient.  I personally reviewed this patient with the CRNA. Discussed and agreed on the Anesthesia Plan with the CRNA..      NPO Status:  Vitals Value Taken Time   Date of last liquid 01/29/25 01/29/25 0705   Time of last liquid 0430 01/29/25 0705   Date of last solid 01/27/25 01/29/25 0705   Time of last solid 1900 01/29/25 0705

## 2025-02-13 ENCOUNTER — RA CDI HCC (OUTPATIENT)
Dept: OTHER | Facility: HOSPITAL | Age: 80
End: 2025-02-13

## 2025-02-20 ENCOUNTER — OFFICE VISIT (OUTPATIENT)
Dept: INTERNAL MEDICINE CLINIC | Age: 80
End: 2025-02-20
Payer: MEDICARE

## 2025-02-20 VITALS
HEIGHT: 68 IN | BODY MASS INDEX: 31.52 KG/M2 | SYSTOLIC BLOOD PRESSURE: 112 MMHG | OXYGEN SATURATION: 98 % | WEIGHT: 208 LBS | TEMPERATURE: 98.3 F | DIASTOLIC BLOOD PRESSURE: 60 MMHG | HEART RATE: 73 BPM

## 2025-02-20 DIAGNOSIS — G95.19 OTHER VASCULAR MYELOPATHIES (HCC): ICD-10-CM

## 2025-02-20 DIAGNOSIS — E78.2 MIXED HYPERLIPIDEMIA: Primary | ICD-10-CM

## 2025-02-20 DIAGNOSIS — I10 PRIMARY HYPERTENSION: ICD-10-CM

## 2025-02-20 DIAGNOSIS — I65.29 ASYMPTOMATIC CAROTID ARTERY STENOSIS, UNSPECIFIED LATERALITY: ICD-10-CM

## 2025-02-20 DIAGNOSIS — M48.07 LUMBOSACRAL STENOSIS WITH NEUROGENIC CLAUDICATION: ICD-10-CM

## 2025-02-20 DIAGNOSIS — R73.01 IMPAIRED FASTING GLUCOSE: ICD-10-CM

## 2025-02-20 PROCEDURE — 99214 OFFICE O/P EST MOD 30 MIN: CPT | Performed by: PHYSICIAN ASSISTANT

## 2025-02-20 NOTE — ASSESSMENT & PLAN NOTE
Low-cholesterol diet  Continue simvastatin  Orders:    CBC and differential; Future    Comprehensive metabolic panel; Future    Lipid panel; Future    TSH, 3rd generation; Future    Hemoglobin A1C; Future

## 2025-02-20 NOTE — PROGRESS NOTES
Name: Nader Laura      : 1945      MRN: [id number]  Encounter Provider: Maia Levy PA-C  Encounter Date: 2025   Encounter department: [facility]  :  Assessment & Plan  Mixed hyperlipidemia  Low-cholesterol diet  Continue simvastatin  Orders:    CBC and differential; Future    Comprehensive metabolic panel; Future    Lipid panel; Future    TSH, 3rd generation; Future    Hemoglobin A1C; Future    Primary hypertension  Blood pressure well-controlled, continue losartan, HCTZ, atenolol  Orders:    CBC and differential; Future    Comprehensive metabolic panel; Future    Lipid panel; Future    TSH, 3rd generation; Future    Hemoglobin A1C; Future    Impaired fasting glucose  Encouraged to avoid concentrated sweets  Check hemoglobin A1c  Orders:    CBC and differential; Future    Comprehensive metabolic panel; Future    Lipid panel; Future    TSH, 3rd generation; Future    Hemoglobin A1C; Future    Asymptomatic carotid artery stenosis, unspecified laterality  Discussed carotid ultrasound result, repeat due 2025       Other vascular myelopathies (HCC)         Lumbosacral stenosis with neurogenic claudication  Plans to make follow-up with spine and pain management       Parking placard certificate completed       History of Present Illness   80 y/o male with PMH sig fo CAD, essential htn, [place], impaired FBS presents for f/u   VAS screening results reviewed with pt - <50% stenosis b/l   Repeat in     [facility] and [facility] from [facility]     Pt had seen spine and pain management and had injection in lumbar spine which only lasted around 1 month   He did complete PT as well which he found minimally helpful  Pt does take ibuprofen daily in am for pain, minimal relief  Plans to f/u with pain management            Review of Systems   Constitutional:  Negative for activity change, appetite change, chills, diaphoresis and fever.   HENT:   "Negative for congestion and sore throat.    Respiratory:  Negative for cough and shortness of breath.    Cardiovascular:  Negative for chest pain and leg swelling.   Gastrointestinal:  Negative for abdominal pain, constipation, diarrhea and nausea.   Musculoskeletal:  Positive for back pain and neck stiffness (intermittent). Negative for neck pain.   Skin:  Negative for rash.   Neurological:  Negative for dizziness, light-headedness and headaches.   Psychiatric/Behavioral:  Negative for sleep disturbance. The patient is not nervous/anxious.        Objective   /60 (BP Location: Left arm, Patient Position: Sitting, Cuff Size: Standard)   Pulse 73   Temp 98.3 °F (36.8 °C) (Temporal)   Ht 5' 8\" (1.727 m)   Wt 94.3 kg (208 lb)   SpO2 98%   BMI 31.63 kg/m²      Physical Exam  Vitals reviewed.   Constitutional:       General: He is not in acute distress.  HENT:      Head: Normocephalic and atraumatic.      Right Ear: Tympanic membrane, ear canal and external ear normal.      Left Ear: Tympanic membrane, ear canal and external ear normal.      Nose: Nose normal.   Eyes:      General:         Right eye: No discharge.         Left eye: No discharge.      Extraocular Movements: Extraocular movements intact.      Conjunctiva/sclera: Conjunctivae normal.      Pupils: Pupils are equal, round, and reactive to light.   Cardiovascular:      Rate and Rhythm: Normal rate and regular rhythm.   Pulmonary:      Effort: Pulmonary effort is normal. No respiratory distress.      Breath sounds: Normal breath sounds. No wheezing.   Musculoskeletal:         General: Normal range of motion.      Cervical back: Normal range of motion. No rigidity.      Right lower leg: No edema.      Left lower leg: No edema.   Neurological:      General: No focal deficit present.      Mental Status: He is alert and oriented to person, place, and time.   Psychiatric:         Mood and Affect: Mood normal.       Administrative Statements   I have spent " a total time of 20 minutes in caring for this patient on the day of the visit/encounter including Documenting in the medical record, Reviewing/placing orders in the medical record (including tests, medications, and/or procedures), and Obtaining or reviewing history  .

## 2025-02-20 NOTE — ASSESSMENT & PLAN NOTE
Encouraged to avoid concentrated sweets  Check hemoglobin A1c  Orders:    CBC and differential; Future    Comprehensive metabolic panel; Future    Lipid panel; Future    TSH, 3rd generation; Future    Hemoglobin A1C; Future

## 2025-02-20 NOTE — ASSESSMENT & PLAN NOTE
Blood pressure well-controlled, continue losartan, HCTZ, atenolol  Orders:    CBC and differential; Future    Comprehensive metabolic panel; Future    Lipid panel; Future    TSH, 3rd generation; Future    Hemoglobin A1C; Future

## 2025-03-19 ENCOUNTER — APPOINTMENT (OUTPATIENT)
Dept: LAB | Age: 80
End: 2025-03-19
Payer: MEDICARE

## 2025-03-19 DIAGNOSIS — E55.9 VITAMIN D DEFICIENCY: ICD-10-CM

## 2025-03-19 DIAGNOSIS — I10 PRIMARY HYPERTENSION: ICD-10-CM

## 2025-03-19 DIAGNOSIS — R73.01 IMPAIRED FASTING GLUCOSE: ICD-10-CM

## 2025-03-19 DIAGNOSIS — E78.2 MIXED HYPERLIPIDEMIA: ICD-10-CM

## 2025-03-19 LAB
25(OH)D3 SERPL-MCNC: 44 NG/ML (ref 30–100)
ALBUMIN SERPL BCG-MCNC: 4.1 G/DL (ref 3.5–5)
ALP SERPL-CCNC: 75 U/L (ref 34–104)
ALT SERPL W P-5'-P-CCNC: 15 U/L (ref 7–52)
ANION GAP SERPL CALCULATED.3IONS-SCNC: 9 MMOL/L (ref 4–13)
AST SERPL W P-5'-P-CCNC: 21 U/L (ref 13–39)
BASOPHILS # BLD AUTO: 0.05 THOUSANDS/ÂΜL (ref 0–0.1)
BASOPHILS NFR BLD AUTO: 1 % (ref 0–1)
BILIRUB SERPL-MCNC: 0.86 MG/DL (ref 0.2–1)
BUN SERPL-MCNC: 12 MG/DL (ref 5–25)
CALCIUM SERPL-MCNC: 9.4 MG/DL (ref 8.4–10.2)
CHLORIDE SERPL-SCNC: 97 MMOL/L (ref 96–108)
CHOLEST SERPL-MCNC: 111 MG/DL (ref ?–200)
CO2 SERPL-SCNC: 30 MMOL/L (ref 21–32)
CREAT SERPL-MCNC: 0.84 MG/DL (ref 0.6–1.3)
EOSINOPHIL # BLD AUTO: 1.09 THOUSAND/ÂΜL (ref 0–0.61)
EOSINOPHIL NFR BLD AUTO: 18 % (ref 0–6)
ERYTHROCYTE [DISTWIDTH] IN BLOOD BY AUTOMATED COUNT: 12.2 % (ref 11.6–15.1)
GFR SERPL CREATININE-BSD FRML MDRD: 83 ML/MIN/1.73SQ M
GLUCOSE P FAST SERPL-MCNC: 105 MG/DL (ref 65–99)
HCT VFR BLD AUTO: 39.7 % (ref 36.5–49.3)
HDLC SERPL-MCNC: 46 MG/DL
HGB BLD-MCNC: 13.8 G/DL (ref 12–17)
IMM GRANULOCYTES # BLD AUTO: 0.01 THOUSAND/UL (ref 0–0.2)
IMM GRANULOCYTES NFR BLD AUTO: 0 % (ref 0–2)
LDLC SERPL CALC-MCNC: 38 MG/DL (ref 0–100)
LYMPHOCYTES # BLD AUTO: 1.75 THOUSANDS/ÂΜL (ref 0.6–4.47)
LYMPHOCYTES NFR BLD AUTO: 29 % (ref 14–44)
MCH RBC QN AUTO: 34.5 PG (ref 26.8–34.3)
MCHC RBC AUTO-ENTMCNC: 34.8 G/DL (ref 31.4–37.4)
MCV RBC AUTO: 99 FL (ref 82–98)
MONOCYTES # BLD AUTO: 0.57 THOUSAND/ÂΜL (ref 0.17–1.22)
MONOCYTES NFR BLD AUTO: 9 % (ref 4–12)
NEUTROPHILS # BLD AUTO: 2.59 THOUSANDS/ÂΜL (ref 1.85–7.62)
NEUTS SEG NFR BLD AUTO: 43 % (ref 43–75)
NONHDLC SERPL-MCNC: 65 MG/DL
NRBC BLD AUTO-RTO: 0 /100 WBCS
PLATELET # BLD AUTO: 160 THOUSANDS/UL (ref 149–390)
PMV BLD AUTO: 9.9 FL (ref 8.9–12.7)
POTASSIUM SERPL-SCNC: 4.3 MMOL/L (ref 3.5–5.3)
PROT SERPL-MCNC: 6.7 G/DL (ref 6.4–8.4)
RBC # BLD AUTO: 4 MILLION/UL (ref 3.88–5.62)
SODIUM SERPL-SCNC: 136 MMOL/L (ref 135–147)
TRIGL SERPL-MCNC: 136 MG/DL (ref ?–150)
TSH SERPL DL<=0.05 MIU/L-ACNC: 4.22 UIU/ML (ref 0.45–4.5)
WBC # BLD AUTO: 6.06 THOUSAND/UL (ref 4.31–10.16)

## 2025-03-19 PROCEDURE — 36415 COLL VENOUS BLD VENIPUNCTURE: CPT

## 2025-03-19 PROCEDURE — 80061 LIPID PANEL: CPT

## 2025-03-19 PROCEDURE — 82306 VITAMIN D 25 HYDROXY: CPT

## 2025-03-19 PROCEDURE — 85025 COMPLETE CBC W/AUTO DIFF WBC: CPT

## 2025-03-19 PROCEDURE — 84443 ASSAY THYROID STIM HORMONE: CPT

## 2025-03-19 PROCEDURE — 80053 COMPREHEN METABOLIC PANEL: CPT

## 2025-04-23 ENCOUNTER — OFFICE VISIT (OUTPATIENT)
Dept: INTERNAL MEDICINE CLINIC | Age: 80
End: 2025-04-23
Payer: MEDICARE

## 2025-04-23 VITALS
OXYGEN SATURATION: 97 % | WEIGHT: 207.4 LBS | HEART RATE: 65 BPM | BODY MASS INDEX: 31.43 KG/M2 | SYSTOLIC BLOOD PRESSURE: 120 MMHG | HEIGHT: 68 IN | TEMPERATURE: 97.9 F | DIASTOLIC BLOOD PRESSURE: 76 MMHG

## 2025-04-23 DIAGNOSIS — Z00.00 MEDICARE ANNUAL WELLNESS VISIT, SUBSEQUENT: Primary | ICD-10-CM

## 2025-04-23 DIAGNOSIS — S16.1XXA STRAIN OF NECK MUSCLE, INITIAL ENCOUNTER: ICD-10-CM

## 2025-04-23 DIAGNOSIS — I10 PRIMARY HYPERTENSION: ICD-10-CM

## 2025-04-23 PROCEDURE — 99213 OFFICE O/P EST LOW 20 MIN: CPT | Performed by: PHYSICIAN ASSISTANT

## 2025-04-23 PROCEDURE — G0439 PPPS, SUBSEQ VISIT: HCPCS | Performed by: PHYSICIAN ASSISTANT

## 2025-04-23 RX ORDER — BETAMETHASONE DIPROPIONATE 0.5 MG/G
1 CREAM TOPICAL DAILY
COMMUNITY
Start: 2025-04-10

## 2025-04-23 NOTE — PROGRESS NOTES
Name: Nader Laura      : 1945      MRN: [id number]  Encounter Provider: Maia Levy PA-C  Encounter Date: 2025   Encounter department: [facility]  :  Assessment & Plan  Medicare annual wellness visit, subsequent         Strain of neck muscle, initial encounter  May continue tylenol prn  Heat affected area  Start PT - if persisting to obtain xray   Orders:    Ambulatory Referral to Physical Therapy; Future    Primary hypertension  Well controlled  Continue atenolol           Preventive health issues were discussed with patient, and age appropriate screening tests were ordered as noted in patient's After Visit Summary. Personalized health advice and appropriate referrals for health education or preventive services given if needed, as noted in patient's After Visit Summary.    History of Present Illness     78 y/o male with hx of HTN, HLD presents for AWV  Pt reports he went for hearing testing and will be going back for hearing aid fitting in near future   Pt does report he has tinnitus as well     Patient reports left-sided neck tightness and muscle strain for the past few weeks  Patient denies associated headache or dizziness, lightheadedness  He has taken Tylenol for this which has been helpful but states it always feels tight in that area         Patient Care Team:  Maia Levy PA-C as PCP - General (Physician Assistant)  Hawk Wang MD  W Padilla Henley MD as Endoscopist    Review of Systems   Constitutional:  Negative for activity change, appetite change, chills, diaphoresis, fatigue and fever.   HENT:  Positive for hearing loss. Negative for congestion and sore throat.    Eyes:  Negative for pain and redness.   Respiratory:  Negative for cough, shortness of breath and wheezing.    Cardiovascular:  Negative for chest pain and leg swelling.   Gastrointestinal:  Negative for abdominal pain, constipation, diarrhea and nausea.   Endocrine:  Negative for cold intolerance and heat intolerance.   Genitourinary:  Negative for dysuria and flank pain.   Musculoskeletal:  Positive for arthralgias, neck pain and neck stiffness. Negative for back pain and gait problem.   Skin:  Negative for rash.   Allergic/Immunologic: Negative for environmental allergies.   Neurological:  Negative for dizziness, light-headedness and headaches.   Psychiatric/Behavioral:  Negative for sleep disturbance. The patient is not nervous/anxious.      Medical History Reviewed by provider this encounter:       Annual Wellness Visit Questionnaire   Nader is here for his Subsequent Wellness visit.     Health Risk Assessment:   Patient rates overall health as good. Patient feels that their physical health rating is same. Patient is very satisfied with their life. Eyesight was rated as slightly worse. Hearing was rated as same. Patient feels that their emotional and mental health rating is same. Patients states they are never, rarely angry. Patient states they are sometimes unusually tired/fatigued. Pain experienced in the last 7 days has been some. Patient's pain rating has been 5/10. Patient states that he has experienced no weight loss or gain in last 6 months.     Depression Screening:   PHQ-9 Score: 0      Fall Risk Screening:   In the past year, patient has experienced: no history of falling in past year      Home Safety:  Patient does not have trouble with stairs inside or outside of their home. Patient has working smoke alarms and has working carbon monoxide detector. Home safety hazards include: none.     Nutrition:   Current diet is Regular.     Medications:   Patient is currently taking over-the-counter supplements. OTC medications include: see medication list. Patient is able to manage medications.     Activities of Daily Living (ADLs)/Instrumental Activities of Daily Living (IADLs):   Walk and transfer into and out of bed and chair?: Yes  Dress and groom yourself?: Yes    Bathe  or shower yourself?: Yes    Feed yourself? Yes  Do your laundry/housekeeping?: Yes  Manage your money, pay your bills and track your expenses?: Yes  Make your own meals?: Yes    Do your own shopping?: Yes    Previous Hospitalizations:   Any hospitalizations or ED visits within the last 12 months?: Yes    How many hospitalizations have you had in the last year?: 1-2    Hospitalization Comments: No hospitalizations    Advance Care Planning:   Living will: Yes    Durable POA for healthcare: Yes    Advanced directive: Yes      Cognitive Screening:   Provider or family/friend/caregiver concerned regarding cognition?: No    Preventive Screenings      Cardiovascular Screening:    General: Screening Not Indicated and History Lipid Disorder      Diabetes Screening:     General: Screening Current      Colorectal Cancer Screening:     General: Screening Current      Prostate Cancer Screening:    General: Screening Not Indicated      Osteoporosis Screening:    General: Screening Not Indicated      Abdominal Aortic Aneurysm (AAA) Screening:    Risk factors include: tobacco use        General: Screening Not Indicated      Lung Cancer Screening:     General: Screening Not Indicated      Hepatitis C Screening:    General: Screening Current    Screening, Brief Intervention, and Referral to Treatment (SBIRT)     Screening  Typical number of drinks in a day: 2  Typical number of drinks in a week: 14  Interpretation: Low risk drinking behavior.    AUDIT-C Screenin) How often did you have a drink containing alcohol in the past year? 4 or more times a week  2) How many drinks did you have on a typical day when you were drinking in the past year? 1 to 2  3) How often did you have 6 or more drinks on one occasion in the past year? never    AUDIT-C Score: 4  Interpretation: Score 4-12 (male): POSITIVE screen for alcohol misuse    AUDIT Screenin) How often during the last year have you found that you were not able to stop drinking  once you had started? 0 - never  5) How often during the last year have you failed to do what was normally expected from you because of drinking? 0 - never  6) How often during the last year have you needed a first drink in the morning to get yourself going after a heavy drinking session? 0 - never  7) How often during the last year have you had a feeling of guilt or remorse after drinking? 0 - never  8) How often during the last year have you been unable to remember what happened the night before because you had been drinking? 0 - never  9) Have you or someone else been injured as a result of your drinking? 0 - no  10) Has a relative or friend or a doctor or another health worker been concerned about your drinking or suggested you cut down? 0 - no    AUDIT Score: 4  Interpretation: Low risk alcohol consumption    Single Item Drug Screening:  How often have you used an illegal drug (including marijuana) or a prescription medication for non-medical reasons in the past year? never    Single Item Drug Screen Score: 0  Interpretation: Negative screen for possible drug use disorder    Brief Intervention  Alcohol & drug use screenings were reviewed. No concerns regarding substance use disorder identified.     Other Counseling Topics:   Calcium and vitamin D intake and regular weightbearing exercise.     Social Drivers of Health     Food Insecurity: No Food Insecurity (3/17/2025)    Hunger Vital Sign     Worried About Running Out of Food in the Last Year: Never true     Ran Out of Food in the Last Year: Never true   Transportation Needs: No Transportation Needs (3/17/2025)    PRAPARE - Transportation     Lack of Transportation (Medical): No     Lack of Transportation (Non-Medical): No   Housing Stability: Low Risk  (3/17/2025)    Housing Stability Vital Sign     Unable to Pay for Housing in the Last Year: No     Number of Times Moved in the Last Year: 0     Homeless in the Last Year: No   Utilities: Not At Risk (3/17/2025)     [facility] Utilities     Threatened with loss of utilities: No     No results found.    Objective   There were no vitals taken for this visit.    Physical Exam  Vitals reviewed.   Constitutional:       General: He is not in acute distress.  HENT:      Head: Normocephalic and atraumatic.      Right Ear: Tympanic membrane, ear canal and external ear normal. There is no impacted cerumen.      Left Ear: Tympanic membrane, ear canal and external ear normal. There is no impacted cerumen.      Nose: Nose normal.      Mouth/Throat:      Mouth: Mucous membranes are moist.   Eyes:      General:         Right eye: No discharge.         Left eye: No discharge.      Extraocular Movements: Extraocular movements intact.      Conjunctiva/sclera: Conjunctivae normal.      Pupils: Pupils are equal, round, and reactive to light.   Cardiovascular:      Rate and Rhythm: Normal rate and regular rhythm.      Pulses: Normal pulses.   Pulmonary:      Effort: Pulmonary effort is normal. No respiratory distress.      Breath sounds: Normal breath sounds. No wheezing or rales.   Abdominal:      General: Bowel sounds are normal. There is no distension.   Musculoskeletal:         General: Normal range of motion.      Cervical back: Normal range of motion. Tenderness (L paraspinal muscles) present. No rigidity.      Right lower leg: No edema.      Left lower leg: No edema.   Lymphadenopathy:      Cervical: No cervical adenopathy.   Skin:     General: Skin is warm.   Neurological:      General: No focal deficit present.      Mental Status: He is alert.      Cranial Nerves: No cranial nerve deficit.   Psychiatric:         Mood and Affect: Mood normal.         Behavior: Behavior normal.

## 2025-04-23 NOTE — PATIENT INSTRUCTIONS
Look into RSV vaccine at pharmacy   Medicare Preventive Visit Patient Instructions  Thank you for completing your Welcome to Medicare Visit or Medicare Annual Wellness Visit today. Your next wellness visit will be due in one year (4/24/2026).  The screening/preventive services that you may require over the next 5-10 years are detailed below. Some tests may not apply to you based off risk factors and/or age. Screening tests ordered at today's visit but not completed yet may show as past due. Also, please note that scanned in results may not display below.  Preventive Screenings:  Service Recommendations Previous Testing/Comments   Colorectal Cancer Screening  Colonoscopy    Fecal Occult Blood Test (FOBT)/Fecal Immunochemical Test (FIT)  Fecal DNA/Cologuard Test  Flexible Sigmoidoscopy Age: 45-75 years old   Colonoscopy: every 10 years (May be performed more frequently if at higher risk)  OR  FOBT/FIT: every 1 year  OR  Cologuard: every 3 years  OR  Sigmoidoscopy: every 5 years  Screening may be recommended earlier than age 45 if at higher risk for colorectal cancer. Also, an individualized decision between you and your healthcare provider will decide whether screening between the ages of 76-85 would be appropriate. Colonoscopy: 01/29/2025  FOBT/FIT: Not on file  Cologuard: Not on file  Sigmoidoscopy: Not on file    Screening Current     Prostate Cancer Screening Individualized decision between patient and health care provider in men between ages of 55-69   Medicare will cover every 12 months beginning on the day after your 50th birthday PSA: 0.408 ng/mL     Screening Not Indicated     Hepatitis C Screening Once for adults born between 1945 and 1965  More frequently in patients at high risk for Hepatitis C Hep C Antibody: 06/16/2020    Screening Current   Diabetes Screening 1-2 times per year if you're at risk for diabetes or have pre-diabetes Fasting glucose: 105 mg/dL (3/19/2025)  A1C: 5.6 % (8/26/2021)  Screening  Current   Cholesterol Screening Once every 5 years if you don't have a lipid disorder. May order more often based on risk factors. Lipid panel: 03/19/2025  Screening Not Indicated  History Lipid Disorder      Other Preventive Screenings Covered by Medicare:  Abdominal Aortic Aneurysm (AAA) Screening: covered once if your at risk. You're considered to be at risk if you have a family history of AAA or a male between the age of 65-75 who smoking at least 100 cigarettes in your lifetime.  Lung Cancer Screening: covers low dose CT scan once per year if you meet all of the following conditions: (1) Age 55-77; (2) No signs or symptoms of lung cancer; (3) Current smoker or have quit smoking within the last 15 years; (4) You have a tobacco smoking history of at least 20 pack years (packs per day x number of years you smoked); (5) You get a written order from a healthcare provider.  Glaucoma Screening: covered annually if you're considered high risk: (1) You have diabetes OR (2) Family history of glaucoma OR (3)  aged 50 and older OR (4)  American aged 65 and older  Osteoporosis Screening: covered every 2 years if you meet one of the following conditions: (1) Have a vertebral abnormality; (2) On glucocorticoid therapy for more than 3 months; (3) Have primary hyperparathyroidism; (4) On osteoporosis medications and need to assess response to drug therapy.  HIV Screening: covered annually if you're between the age of 15-65. Also covered annually if you are younger than 15 and older than 65 with risk factors for HIV infection. For pregnant patients, it is covered up to 3 times per pregnancy.    Immunizations:  Immunization Recommendations   Influenza Vaccine Annual influenza vaccination during flu season is recommended for all persons aged >= 6 months who do not have contraindications   Pneumococcal Vaccine   * Pneumococcal conjugate vaccine = PCV13 (Prevnar 13), PCV15 (Vaxneuvance), PCV20 (Prevnar 20)  *  Pneumococcal polysaccharide vaccine = PPSV23 (Pneumovax) Adults 19-63 yo with certain risk factors or if 65+ yo  If never received any pneumonia vaccine: recommend Prevnar 20 (PCV20)  Give PCV20 if previously received 1 dose of PCV13 or PPSV23   Hepatitis B Vaccine 3 dose series if at intermediate or high risk (ex: diabetes, end stage renal disease, liver disease)   Respiratory syncytial virus (RSV) Vaccine - COVERED BY MEDICARE PART D  * RSVPreF3 (Arexvy) CDC recommends that adults 60 years of age and older may receive a single dose of RSV vaccine using shared clinical decision-making (SCDM)   Tetanus (Td) Vaccine - COST NOT COVERED BY MEDICARE PART B Following completion of primary series, a booster dose should be given every 10 years to maintain immunity against tetanus. Td may also be given as tetanus wound prophylaxis.   Tdap Vaccine - COST NOT COVERED BY MEDICARE PART B Recommended at least once for all adults. For pregnant patients, recommended with each pregnancy.   Shingles Vaccine (Shingrix) - COST NOT COVERED BY MEDICARE PART B  2 shot series recommended in those 19 years and older who have or will have weakened immune systems or those 50 years and older     Health Maintenance Due:      Topic Date Due    Hepatitis C Screening  Completed    Colorectal Cancer Screening  Discontinued     Immunizations Due:      Topic Date Due    COVID-19 Vaccine (5 - 2024-25 season) 09/01/2024     Advance Directives   What are advance directives?  Advance directives are legal documents that state your wishes and plans for medical care. These plans are made ahead of time in case you lose your ability to make decisions for yourself. Advance directives can apply to any medical decision, such as the treatments you want, and if you want to donate organs.   What are the types of advance directives?  There are many types of advance directives, and each state has rules about how to use them. You may choose a combination of any of  the following:  Living will:  This is a written record of the treatment you want. You can also choose which treatments you do not want, which to limit, and which to stop at a certain time. This includes surgery, medicine, IV fluid, and tube feedings.   Durable power of  for healthcare (DPAHC):  This is a written record that states who you want to make healthcare choices for you when you are unable to make them for yourself. This person, called a proxy, is usually a family member or a friend. You may choose more than 1 proxy.  Do not resuscitate (DNR) order:  A DNR order is used in case your heart stops beating or you stop breathing. It is a request not to have certain forms of treatment, such as CPR. A DNR order may be included in other types of advance directives.  Medical directive:  This covers the care that you want if you are in a coma, near death, or unable to make decisions for yourself. You can list the treatments you want for each condition. Treatment may include pain medicine, surgery, blood transfusions, dialysis, IV or tube feedings, and a ventilator (breathing machine).  Values history:  This document has questions about your views, beliefs, and how you feel and think about life. This information can help others choose the care that you would choose.  Why are advance directives important?  An advance directive helps you control your care. Although spoken wishes may be used, it is better to have your wishes written down. Spoken wishes can be misunderstood, or not followed. Treatments may be given even if you do not want them. An advance directive may make it easier for your family to make difficult choices about your care.   Weight Management   Why it is important to manage your weight:  Being overweight increases your risk of health conditions such as heart disease, high blood pressure, type 2 diabetes, and certain types of cancer. It can also increase your risk for osteoarthritis, sleep apnea,  and other respiratory problems. Aim for a slow, steady weight loss. Even a small amount of weight loss can lower your risk of health problems.  How to lose weight safely:  A safe and healthy way to lose weight is to eat fewer calories and get regular exercise. You can lose up about 1 pound a week by decreasing the number of calories you eat by 500 calories each day.   Healthy meal plan for weight management:  A healthy meal plan includes a variety of foods, contains fewer calories, and helps you stay healthy. A healthy meal plan includes the following:  Eat whole-grain foods more often.  A healthy meal plan should contain fiber. Fiber is the part of grains, fruits, and vegetables that is not broken down by your body. Whole-grain foods are healthy and provide extra fiber in your diet. Some examples of whole-grain foods are whole-wheat breads and pastas, oatmeal, brown rice, and bulgur.  Eat a variety of vegetables every day.  Include dark, leafy greens such as spinach, kale, radha greens, and mustard greens. Eat yellow and orange vegetables such as carrots, sweet potatoes, and winter squash.   Eat a variety of fruits every day.  Choose fresh or canned fruit (canned in its own juice or light syrup) instead of juice. Fruit juice has very little or no fiber.  Eat low-fat dairy foods.  Drink fat-free (skim) milk or 1% milk. Eat fat-free yogurt and low-fat cottage cheese. Try low-fat cheeses such as mozzarella and other reduced-fat cheeses.  Choose meat and other protein foods that are low in fat.  Choose beans or other legumes such as split peas or lentils. Choose fish, skinless poultry (chicken or turkey), or lean cuts of red meat (beef or pork). Before you cook meat or poultry, cut off any visible fat.   Use less fat and oil.  Try baking foods instead of frying them. Add less fat, such as margarine, sour cream, regular salad dressing and mayonnaise to foods. Eat fewer high-fat foods. Some examples of high-fat foods  "include french fries, doughnuts, ice cream, and cakes.  Eat fewer sweets.  Limit foods and drinks that are high in sugar. This includes candy, cookies, regular soda, and sweetened drinks.  Exercise:  Exercise at least 30 minutes per day on most days of the week. Some examples of exercise include walking, biking, dancing, and swimming. You can also fit in more physical activity by taking the stairs instead of the elevator or parking farther away from stores. Ask your healthcare provider about the best exercise plan for you.   Alcohol Use and Your Health    Drinking too much can harm your health.  Excessive alcohol use leads to about 88,000 death in the [place] each year, and shortens the life of those who diet by almost 30 years.  Further, excessive drinking cost the economy $249 billion in 2010.  Most excessive drinkers are not alcohol dependent.    Excessive alcohol use has immediate effects that increase the risk of many harmful health conditions.  These are most often the result of binge drinking.  Over time, excessive alcohol use can lead to the development of chronic diseases and other series health problems.    What is considered a \"drink\"?        Excessive alcohol use includes:  Binge Drinking: For women, 4 or more drinks consumed on one occasion. For men, 5 or more drinks consumed on one occasion.  Heavy Drinking: For women, 8 or more drinks per week. For men, 15 or more drinks per week  Any alcohol used by pregnant women  Any alcohol used by those under the age of 21 years    If you choose to drink, do so in moderation:  Do not drink at all if you are under the age of 21, or if you are or may be pregnant, or have health problems that could be made worse by drinking.  For women, up to 1 drink per day  For men, up to 2 drinks a day    No one should begin drinking or drink more frequently based on potential health benefits    Short-Term Health Risks:  Injuries: motor vehicle crashes, falls, drownings, " burns  Violence: homicide, suicide, sexual assault, intimate partner violence  Alcohol poisoning  Reproductive health: risky sexual behaviors, unintended prengnacy, sexually transmitted diseases, miscarriage, stillbirth, fetal alcohol syndrome    Long-Term Health Risks:  Chronic diseases: high blood pressure, heart disease, stroke, liver disease, digestive problems  Cancers: breast, mouth and throat, liver, colon  Learning and memory problems: dementia, poor school performance  Mental health: depression, anxiety, insomnia  Social problems: lost productivity, family problems, unemployment  Alcohol dependence    For support and more information:  Substance Abuse and Mental Health Services Administration  [address]  [place] [address]  Web Address: http://www.sama.gov    Alcoholics Anonymous        Web Address: http://www.aa.org    https://www.cdc.gov/alcohol/fact-sheets/alcohol-use.htm     © Copyright [employer] 2018 Information is for End User's use only and may not be sold, redistributed or otherwise used for commercial purposes. All illustrations and images included in CareNotes® are the copyrighted property of [employer].D.A[employer]M., Inc. or [employer]ila

## 2025-05-20 ENCOUNTER — OFFICE VISIT (OUTPATIENT)
Dept: PAIN MEDICINE | Facility: CLINIC | Age: 80
End: 2025-05-20
Payer: MEDICARE

## 2025-05-20 VITALS — WEIGHT: 200 LBS | HEART RATE: 62 BPM | BODY MASS INDEX: 30.31 KG/M2 | HEIGHT: 68 IN

## 2025-05-20 DIAGNOSIS — M79.18 MYOFASCIAL PAIN SYNDROME: ICD-10-CM

## 2025-05-20 DIAGNOSIS — M46.1 SACROILIITIS (HCC): ICD-10-CM

## 2025-05-20 DIAGNOSIS — G89.4 CHRONIC PAIN SYNDROME: Primary | ICD-10-CM

## 2025-05-20 PROCEDURE — 99214 OFFICE O/P EST MOD 30 MIN: CPT | Performed by: NURSE PRACTITIONER

## 2025-05-20 PROCEDURE — G2211 COMPLEX E/M VISIT ADD ON: HCPCS | Performed by: NURSE PRACTITIONER

## 2025-05-20 RX ORDER — METHOCARBAMOL 500 MG/1
500 TABLET, FILM COATED ORAL 2 TIMES DAILY PRN
Qty: 60 TABLET | Refills: 0 | Status: SHIPPED | OUTPATIENT
Start: 2025-05-20 | End: 2025-05-20

## 2025-05-20 RX ORDER — TIZANIDINE 2 MG/1
2 TABLET ORAL 2 TIMES DAILY PRN
Qty: 60 TABLET | Refills: 0 | Status: SHIPPED | OUTPATIENT
Start: 2025-05-20

## 2025-05-20 NOTE — TELEPHONE ENCOUNTER
I got a message from patient's pharmacy stating that Robaxin was not covered.  I sent a prescription for tizanidine 2 mg twice daily if needed for spasms.

## 2025-05-20 NOTE — PROGRESS NOTES
Assessment:  1. Chronic pain syndrome    2. Sacroiliitis (HCC)    3. Myofascial pain syndrome        Plan:  While the patient was in the office today, I did have a thorough conversation regarding their chronic pain syndrome, medication management, and treatment plan options.  Patient is being seen for a follow-up visit.  He was last seen here on 12/10/2024 at which time he underwent bilateral sacroiliac joint injections.  Pain was up to 70% improved for about 2 weeks.  He states that his left-sided low back is still better than it was before the injection.  Side still bother him, but the right side bothers him more.    He participated in physical therapy for his lumbar spine from 10/22/2024 until 12/18/2024.  He states that physical therapy was helpful, but admits to not continuing with exercises at home.  We discussed the importance of a lifelong commitment to daily exercises geared toward lumbar core stretching and strengthening. The patient was agreeable and verbalized an understanding.  I provided patient with exercises specific for sacroiliac joint stretching.    Will plan to repeat bilateral sacroiliac joint injections in the near future.    Since the patient is having myofascial pain and/or spasms, I advised the patient that starting on a muscle relaxer could help decrease some of the myofascial pain and/or spasms.  I advised patient that they should not drive or operate heavy machinery while on this medication as it could cause lethargy.  I advised the patient to call our office if they experience any side effects.  The patient verbalized understanding.  Start Robaxin 500 mg twice daily if needed for spasms.    Follow-up 1 month after the injection.    History of Present Illness:    The patient is a 79 y.o. male last seen on 12/10/2024 who presents for a follow up office visit in regards to chronic pain secondary to chronic pain syndrome, chronic low back pain, sacroiliitis, myofascial pain syndrome.  The  patient currently reports complaints of low back pain, right worse than left.  Current pain level is an 8/10.  Quality of pain is described as dull, aching, pressure-like.    Current pain medications includes: Over-the-counter acetaminophen as needed  .  The patient reports that this regimen is providing 50% pain relief.  The patient is reporting no side effects from this pain medication regimen.      I have personally reviewed and/or updated the patient's past medical history, past surgical history, family history, social history, current medications, allergies, and vital signs today.       Review of Systems:    Review of Systems   Musculoskeletal:  Positive for gait problem.        Decreased ROM, muscle weakness, joint stiffness, swelling.         Past Medical History:   Diagnosis Date    Abnormal electrocardiography     resolved: 11/21/2017    Abnormal laboratory test     last assessed: 11/16/2016    Acute myocardial infarction of inferior wall (HCC) 1/20/2015    Anxiety     Community acquired pneumonia     last assessed: 10/15/2015    Complete tear of left rotator cuff     unspecified laterality    Coronary artery disease     Depression     GERD (gastroesophageal reflux disease)     Hyperlipidemia     Hypertension     Impingement syndrome of left shoulder 3/9/2015    Inguinal mass     last assessed: 1/21/2015    Myocardial infarction ([facility])     Nontraumatic rupture of tendons of biceps (long head), left     last assessed: 1/2/2017    Right trigger finger     last assessed: 1/13/2015    Skin lesion     last assessed: 4/21/2016    Skin lesion of cheek     last assessed: 2/25/2016    Thrombocytopenia ([facility]) 1/13/2015       Past Surgical History:   Procedure Laterality Date    CAROTID STENT      transcath placement of carotid artery stent    COLONOSCOPY      CORONARY ANGIOPLASTY      CT COLONOSCOPY FLX DX W/COLLJ SPEC WHEN PFRMD N/A 11/03/2017    Procedure: COLONOSCOPY;  Surgeon: JAH Henley MD;  Location: [facility]   "[facility] LAB;  Service: Colorectal    ROTATOR CUFF REPAIR Bilateral     ROTATOR CUFF REPAIR Bilateral     SHOULDER SURGERY      TOTAL HIP ARTHROPLASTY Left        Family History   Problem Relation Age of Onset    Other Mother         brain tumor    Coronary artery disease Mother     Hypertension Mother     Stroke Mother     Heart disease Mother     No Known Problems Father     Heart attack Maternal Grandfather     Heart disease Maternal Grandfather     Cancer Paternal Grandmother     Cancer Paternal Uncle        Social History     Occupational History    Not on file   Tobacco Use    Smoking status: Former     Current packs/day: 0.00     Average packs/day: 1 pack/day for 20.0 years (20.0 ttl pk-yrs)     Types: Cigarettes     Start date: 1958     Quit date: 1988     Years since quittin.4     Passive exposure: Past    Smokeless tobacco: Never   Vaping Use    Vaping status: Never Used   Substance and Sexual Activity    Alcohol use: Yes     Alcohol/week: 10.0 standard drinks of alcohol     Types: 10 Standard drinks or equivalent per week     Comment: 10-12 gin and tonic per week    Drug use: No    Sexual activity: Not Currently       Current Medications[1]    Allergies[2]    Physical Exam:    Pulse 62   Ht 5' 8\" (1.727 m) Comment: Verbal  Wt 90.7 kg (200 lb) Comment: Verbal  BMI 30.41 kg/m²     Constitutional:normal, well developed, well nourished, alert, in no distress and non-toxic and no overt pain behavior.  Eyes:anicteric  HEENT:grossly intact  Neck:supple, symmetric, trachea midline and no masses   Pulmonary:even and unlabored  Cardiovascular:No edema or pitting edema present  Skin:Normal without rashes or lesions and well hydrated  Psychiatric:Mood and affect appropriate  Neurologic:Cranial Nerves II-XII grossly intact  Musculoskeletal:Mended range of motion of the lumbar spine in all planes.  There is tenderness over bilateral SI joints.  Compression test, distraction test, sacral thrust are " positive.      Imaging  FL spine and pain procedure    (Results Pending)         Orders Placed This Encounter   Procedures    FL spine and pain procedure            [1]   Current Outpatient Medications:     aspirin (ECOTRIN LOW STRENGTH) 81 mg EC tablet, Take 1 tablet (81 mg total) by mouth 2 (two) times a day, Disp: , Rfl:     atenolol (TENORMIN) 25 mg tablet, Take 1 tablet (25 mg total) by mouth daily, Disp: 90 tablet, Rfl: 1    betamethasone, augmented, (DIPROLENE-AF) 0.05 % cream, Apply 1 Application topically in the morning., Disp: , Rfl:     Coenzyme Q10 200 MG capsule, Take 200 mg by mouth in the morning., Disp: , Rfl:     ezetimibe (ZETIA) 10 mg tablet, Take 0.5 tablets (5 mg total) by mouth daily, Disp: , Rfl:     famotidine (PEPCID) 40 MG tablet, TAKE 1 TABLET BY MOUTH EVERY DAY, Disp: 90 tablet, Rfl: 2    finasteride (PROSCAR) 5 mg tablet, Take by mouth, Disp: , Rfl:     hydrochlorothiazide (HYDRODIURIL) 12.5 mg tablet, Take 12.5 mg by mouth in the morning., Disp: , Rfl:     losartan (COZAAR) 50 mg tablet, Take 50 mg by mouth in the morning., Disp: , Rfl:     methocarbamol (ROBAXIN) 500 mg tablet, Take 1 tablet (500 mg total) by mouth 2 (two) times a day as needed for muscle spasms, Disp: 60 tablet, Rfl: 0    Multiple Vitamin (MULTI-VITAMIN) tablet, Take by mouth, Disp: , Rfl:     mupirocin (BACTROBAN) 2 % ointment, , Disp: , Rfl:     omega-3-acid ethyl esters (LOVAZA) 1 g capsule, TAKE 2 CAPSULES (2 G TOTAL) BY MOUTH 2 (TWO) TIMES A DAY, Disp: 360 capsule, Rfl: 3    pantoprazole (PROTONIX) 40 mg tablet, Take 40 mg by mouth in the morning.  ., Disp: , Rfl:     PARoxetine (PAXIL) 20 mg tablet, Take 10 mg by mouth in the morning., Disp: , Rfl:     simvastatin (ZOCOR) 40 mg tablet, Take 20 mg by mouth daily at bedtime Patient taking 20 mg, half of a 40 mg tablet., Disp: , Rfl:     tamsulosin (FLOMAX) 0.4 mg, Take 0.4 mg by mouth in the morning., Disp: , Rfl:     triamcinolone (KENALOG) 0.1 % cream, Apply  topically 2 (two) times a day, Disp: 80 g, Rfl: 2    ipratropium (ATROVENT) 0.03 % nasal spray, SPRAY 2 SPRAYS INTO EACH NOSTRIL EVERY 12 HOURS. (Patient not taking: Reported on 4/23/2025), Disp: 30 mL, Rfl: 1  [2]   Allergies  Allergen Reactions    Atorvastatin GI Intolerance

## 2025-05-28 ENCOUNTER — HOSPITAL ENCOUNTER (OUTPATIENT)
Dept: RADIOLOGY | Facility: CLINIC | Age: 80
Discharge: HOME | End: 2025-05-28
Attending: NURSE PRACTITIONER
Payer: MEDICARE

## 2025-05-28 VITALS
DIASTOLIC BLOOD PRESSURE: 72 MMHG | OXYGEN SATURATION: 94 % | RESPIRATION RATE: 20 BRPM | TEMPERATURE: 98.7 F | SYSTOLIC BLOOD PRESSURE: 130 MMHG | HEART RATE: 61 BPM

## 2025-05-28 DIAGNOSIS — M46.1 SACROILIITIS (HCC): ICD-10-CM

## 2025-05-28 PROCEDURE — 27096 INJECT SACROILIAC JOINT: CPT | Performed by: ANESTHESIOLOGY

## 2025-05-28 RX ORDER — ROPIVACAINE HYDROCHLORIDE 2 MG/ML
4 INJECTION, SOLUTION EPIDURAL; INFILTRATION; PERINEURAL ONCE
Status: COMPLETED | OUTPATIENT
Start: 2025-05-28 | End: 2025-05-28

## 2025-05-28 RX ORDER — METHYLPREDNISOLONE ACETATE 80 MG/ML
80 INJECTION, SUSPENSION INTRA-ARTICULAR; INTRALESIONAL; INTRAMUSCULAR; PARENTERAL; SOFT TISSUE ONCE
Status: COMPLETED | OUTPATIENT
Start: 2025-05-28 | End: 2025-05-28

## 2025-05-28 RX ADMIN — ROPIVACAINE HYDROCHLORIDE 4 ML: 2 INJECTION EPIDURAL; INFILTRATION at 09:28

## 2025-05-28 RX ADMIN — IOHEXOL 1 ML: 300 INJECTION, SOLUTION INTRAVENOUS at 09:28

## 2025-05-28 RX ADMIN — METHYLPREDNISOLONE ACETATE 80 MG: 80 INJECTION, SUSPENSION INTRA-ARTICULAR; INTRALESIONAL; INTRAMUSCULAR; SOFT TISSUE at 09:28

## 2025-05-28 NOTE — DISCHARGE INSTR - LAB

## 2025-05-28 NOTE — H&P
History of Present Illness: The patient is a 79 y.o. male who presents with complaints of low back and buttock pain.    Past Medical History[1]    Past Surgical History[2]    Current Medications[3]    Allergies[4]    Physical Exam:   Vitals:    05/28/25 0907   BP: 116/69   Pulse: 59   Resp: 18   Temp: 98.7 °F (37.1 °C)   SpO2: 97%     General: Awake, Alert, Oriented x 3, Mood and affect appropriate  Respiratory: Respirations even and unlabored  Cardiovascular: Peripheral pulses intact; no edema  Musculoskeletal Exam: normal gait    ASA Score: 3    Patient/Chart Verification  Patient ID Verified: Verbal  ID Band Applied: No  Consents Confirmed: To be obtained in the Procedural area  H&P( within 30 days) Verified: To be obtained in the Procedural area  Interval H&P(within 24 hr) Complete (required for Outpatients and Surgery Admit only): To be obtained in the Procedural area  Allergies Reviewed: Yes  Anticoag/NSAID held?: NA  Currently on antibiotics?: No    Assessment:   1. Sacroiliitis (HCC)        Plan: B/L SI injection         [1]   Past Medical History:  Diagnosis Date    Abnormal electrocardiography     resolved: 11/21/2017    Abnormal laboratory test     last assessed: 11/16/2016    Acute myocardial infarction of inferior wall (HCC) 1/20/2015    Anxiety     Community acquired pneumonia     last assessed: 10/15/2015    Complete tear of left rotator cuff     unspecified laterality    Coronary artery disease     Depression     GERD (gastroesophageal reflux disease)     Hyperlipidemia     Hypertension     Impingement syndrome of left shoulder 3/9/2015    Inguinal mass     last assessed: 1/21/2015    Myocardial infarction (HCC)     Nontraumatic rupture of tendons of biceps (long head), left     last assessed: 1/2/2017    Right trigger finger     last assessed: 1/13/2015    Skin lesion     last assessed: 4/21/2016    Skin lesion of cheek     last assessed: 2/25/2016    Thrombocytopenia (HCC) 1/13/2015   [2]   Past  Surgical History:  Procedure Laterality Date    CAROTID STENT      transcath placement of carotid artery stent    COLONOSCOPY      CORONARY ANGIOPLASTY      KY COLONOSCOPY FLX DX W/COLLJ SPEC WHEN PFRMD N/A 11/03/2017    Procedure: COLONOSCOPY;  Surgeon: JAH Henley MD;  Location: [facility];  Service: Colorectal    ROTATOR CUFF REPAIR Bilateral     ROTATOR CUFF REPAIR Bilateral     SHOULDER SURGERY      TOTAL HIP ARTHROPLASTY Left    [3]   Current Outpatient Medications:     aspirin (ECOTRIN LOW STRENGTH) 81 mg EC tablet, Take 1 tablet (81 mg total) by mouth 2 (two) times a day, Disp: , Rfl:     atenolol (TENORMIN) 25 mg tablet, Take 1 tablet (25 mg total) by mouth daily, Disp: 90 tablet, Rfl: 1    betamethasone, augmented, (DIPROLENE-AF) 0.05 % cream, Apply 1 Application topically in the morning., Disp: , Rfl:     Coenzyme Q10 200 MG capsule, Take 200 mg by mouth in the morning., Disp: , Rfl:     ezetimibe (ZETIA) 10 mg tablet, Take 0.5 tablets (5 mg total) by mouth daily, Disp: , Rfl:     famotidine (PEPCID) 40 MG tablet, TAKE 1 TABLET BY MOUTH EVERY DAY, Disp: 90 tablet, Rfl: 2    finasteride (PROSCAR) 5 mg tablet, Take by mouth, Disp: , Rfl:     hydrochlorothiazide (HYDRODIURIL) 12.5 mg tablet, Take 12.5 mg by mouth in the morning., Disp: , Rfl:     ipratropium (ATROVENT) 0.03 % nasal spray, SPRAY 2 SPRAYS INTO EACH NOSTRIL EVERY 12 HOURS. (Patient not taking: Reported on 4/23/2025), Disp: 30 mL, Rfl: 1    losartan (COZAAR) 50 mg tablet, Take 50 mg by mouth in the morning., Disp: , Rfl:     Multiple Vitamin (MULTI-VITAMIN) tablet, Take by mouth, Disp: , Rfl:     mupirocin (BACTROBAN) 2 % ointment, , Disp: , Rfl:     omega-3-acid ethyl esters (LOVAZA) 1 g capsule, TAKE 2 CAPSULES (2 G TOTAL) BY MOUTH 2 (TWO) TIMES A DAY, Disp: 360 capsule, Rfl: 3    pantoprazole (PROTONIX) 40 mg tablet, Take 40 mg by mouth in the morning.  ., Disp: , Rfl:     PARoxetine (PAXIL) 20 mg tablet, Take 10 mg by mouth in the  morning., Disp: , Rfl:     simvastatin (ZOCOR) 40 mg tablet, Take 20 mg by mouth daily at bedtime Patient taking 20 mg, half of a 40 mg tablet., Disp: , Rfl:     tamsulosin (FLOMAX) 0.4 mg, Take 0.4 mg by mouth in the morning., Disp: , Rfl:     tiZANidine (ZANAFLEX) 2 mg tablet, Take 1 tablet (2 mg total) by mouth 2 (two) times a day as needed for muscle spasms, Disp: 60 tablet, Rfl: 0    triamcinolone (KENALOG) 0.1 % cream, Apply topically 2 (two) times a day, Disp: 80 g, Rfl: 2    Current Facility-Administered Medications:     iohexol (OMNIPAQUE) 300 mg/mL injection 1 mL, 1 mL, Intra-articular, Once, Dalton Leyva,     methylPREDNISolone acetate (DEPO-MEDROL) injection 80 mg, 80 mg, Intra-articular, Once, Dalton Leyva DO    ropivacaine (NAROPIN) injection 4 mL, 4 mL, Intra-articular, Once, Dalton Leyva DO  [4]   Allergies  Allergen Reactions    Atorvastatin GI Intolerance

## 2025-05-29 ENCOUNTER — OFFICE VISIT (OUTPATIENT)
Dept: INTERNAL MEDICINE CLINIC | Age: 80
End: 2025-05-29
Payer: MEDICARE

## 2025-05-29 VITALS
BODY MASS INDEX: 32.13 KG/M2 | OXYGEN SATURATION: 95 % | DIASTOLIC BLOOD PRESSURE: 80 MMHG | SYSTOLIC BLOOD PRESSURE: 118 MMHG | HEART RATE: 65 BPM | WEIGHT: 212 LBS | TEMPERATURE: 98.8 F | HEIGHT: 68 IN

## 2025-05-29 DIAGNOSIS — J01.10 ACUTE NON-RECURRENT FRONTAL SINUSITIS: Primary | ICD-10-CM

## 2025-05-29 DIAGNOSIS — I10 PRIMARY HYPERTENSION: ICD-10-CM

## 2025-05-29 DIAGNOSIS — R05.1 ACUTE COUGH: ICD-10-CM

## 2025-05-29 PROCEDURE — 99213 OFFICE O/P EST LOW 20 MIN: CPT | Performed by: PHYSICIAN ASSISTANT

## 2025-05-29 RX ORDER — BENZONATATE 100 MG/1
100 CAPSULE ORAL 3 TIMES DAILY PRN
Qty: 20 CAPSULE | Refills: 0 | Status: SHIPPED | OUTPATIENT
Start: 2025-05-29

## 2025-05-29 RX ORDER — AZITHROMYCIN 250 MG/1
TABLET, FILM COATED ORAL
Qty: 6 TABLET | Refills: 0 | Status: SHIPPED | OUTPATIENT
Start: 2025-05-29 | End: 2025-06-03

## 2025-05-29 NOTE — PROGRESS NOTES
Name: Nader Laura      : 1945      MRN: [id number]  Encounter Provider: Maia Levy PA-C  Encounter Date: 2025   Encounter department: [facility]  :  Assessment & Plan  Acute non-recurrent frontal sinusitis  Take abx with food   Orders:    azithromycin (Zithromax) 250 mg tablet; Take 2 tablets (500 mg total) by mouth daily for 1 day, THEN 1 tablet (250 mg total) daily for 4 days.    Acute cough    Orders:    benzonatate (TESSALON PERLES) 100 mg capsule; Take 1 capsule (100 mg total) by mouth 3 (three) times a day as needed for cough    XR chest pa and lateral; Future    Primary hypertension  Well controlled  Continue hctz              History of Present Illness   78 y/o male with c/o cough and congestion x 2-3 weeks  Pt reports phlegm loosens in am after showering, reports typically white / yellow   Pt reports taking otc robitussin prn   Pt reports associated sore throat on and off  Pt denies fever, night sweats, headaches, earache           Review of Systems   Constitutional:  Negative for activity change, appetite change, chills, diaphoresis, fatigue and fever.   HENT:  Positive for congestion, postnasal drip and sinus pressure. Negative for sinus pain.    Eyes:  Negative for pain and redness.   Respiratory:  Positive for cough. Negative for chest tightness, shortness of breath and wheezing.    Cardiovascular:  Negative for chest pain and leg swelling.   Gastrointestinal:  Negative for abdominal pain, diarrhea, nausea and vomiting.   Musculoskeletal:  Negative for arthralgias, back pain and myalgias.   Skin:  Negative for rash.   Neurological:  Negative for dizziness, light-headedness and headaches.   Hematological:  Does not bruise/bleed easily.   Psychiatric/Behavioral:  Negative for sleep disturbance. The patient is not nervous/anxious.        Objective   /80 (BP Location: Left arm, Patient Position: Sitting, Cuff Size: Large)   Pulse 65   Temp  "98.8 °F (37.1 °C)   Ht 5' 8\" (1.727 m)   Wt 96.2 kg (212 lb)   SpO2 95%   BMI 32.23 kg/m²      Physical Exam  Vitals reviewed.   Constitutional:       General: He is not in acute distress.  HENT:      Head: Normocephalic and atraumatic.      Right Ear: Tympanic membrane, ear canal and external ear normal. There is no impacted cerumen.      Left Ear: Tympanic membrane, ear canal and external ear normal. There is no impacted cerumen.      Nose: Congestion present.      Mouth/Throat:      Pharynx: No oropharyngeal exudate or posterior oropharyngeal erythema.     Eyes:      General:         Right eye: No discharge.         Left eye: No discharge.      Extraocular Movements: Extraocular movements intact.      Conjunctiva/sclera: Conjunctivae normal.      Pupils: Pupils are equal, round, and reactive to light.       Cardiovascular:      Rate and Rhythm: Normal rate and regular rhythm.   Pulmonary:      Effort: Pulmonary effort is normal. No respiratory distress.      Breath sounds: Normal breath sounds. No wheezing or rales.     Musculoskeletal:         General: Normal range of motion.      Cervical back: Normal range of motion. No rigidity.      Right lower leg: No edema.      Left lower leg: No edema.   Lymphadenopathy:      Cervical: No cervical adenopathy.     Neurological:      General: No focal deficit present.      Mental Status: He is alert.     Psychiatric:         Mood and Affect: Mood normal.         "

## 2025-06-07 ENCOUNTER — APPOINTMENT (OUTPATIENT)
Dept: RADIOLOGY | Age: 80
End: 2025-06-07
Payer: MEDICARE

## 2025-06-07 DIAGNOSIS — R05.1 ACUTE COUGH: ICD-10-CM

## 2025-06-07 PROCEDURE — 71046 X-RAY EXAM CHEST 2 VIEWS: CPT

## 2025-06-11 ENCOUNTER — RESULTS FOLLOW-UP (OUTPATIENT)
Dept: INTERNAL MEDICINE CLINIC | Age: 80
End: 2025-06-11

## 2025-06-11 ENCOUNTER — TELEPHONE (OUTPATIENT)
Dept: PAIN MEDICINE | Facility: CLINIC | Age: 80
End: 2025-06-11

## 2025-06-11 DIAGNOSIS — M79.18 MYOFASCIAL PAIN SYNDROME: ICD-10-CM

## 2025-06-11 RX ORDER — TIZANIDINE 2 MG/1
2 TABLET ORAL 2 TIMES DAILY PRN
Qty: 180 TABLET | Refills: 1 | OUTPATIENT
Start: 2025-06-11

## 2025-06-13 NOTE — TELEPHONE ENCOUNTER
Caller: Nader  Doctor/office: Dr Leyva  CB#: [phone]    % of improvement:50 %     Pain Scale (1-10): 2/10

## 2025-08-18 ENCOUNTER — OFFICE VISIT (OUTPATIENT)
Dept: PAIN MEDICINE | Facility: CLINIC | Age: 80
End: 2025-08-18
Payer: MEDICARE

## 2025-08-18 VITALS — WEIGHT: 210 LBS | BODY MASS INDEX: 31.93 KG/M2

## 2025-08-18 DIAGNOSIS — M46.1 SACROILIITIS (HCC): Primary | ICD-10-CM

## 2025-08-18 DIAGNOSIS — M47.816 LUMBAR SPONDYLOSIS: ICD-10-CM

## 2025-08-18 PROCEDURE — 99214 OFFICE O/P EST MOD 30 MIN: CPT | Performed by: NURSE PRACTITIONER

## 2025-08-18 PROCEDURE — G2211 COMPLEX E/M VISIT ADD ON: HCPCS | Performed by: NURSE PRACTITIONER
